# Patient Record
Sex: MALE | Race: BLACK OR AFRICAN AMERICAN | NOT HISPANIC OR LATINO | Employment: UNEMPLOYED | URBAN - METROPOLITAN AREA
[De-identification: names, ages, dates, MRNs, and addresses within clinical notes are randomized per-mention and may not be internally consistent; named-entity substitution may affect disease eponyms.]

---

## 2019-02-19 VITALS — HEIGHT: 64 IN | BODY MASS INDEX: 25.61 KG/M2 | WEIGHT: 150 LBS

## 2019-02-19 DIAGNOSIS — C34.92 MALIGNANT NEOPLASM OF LEFT LUNG STAGE 4: ICD-10-CM

## 2019-02-19 RX ORDER — HYDROCODONE BITARTRATE AND ACETAMINOPHEN 10; 325 MG/1; MG/1
1 TABLET ORAL
Qty: 90 TABLET | Refills: 0 | OUTPATIENT
Start: 2019-02-19

## 2019-02-19 RX ORDER — FOLIC ACID 1 MG/1
1 TABLET ORAL DAILY
COMMUNITY
Start: 2019-02-04 | End: 2019-11-07

## 2019-02-19 RX ORDER — CALCIUM CARBONATE 600 MG
600 TABLET ORAL DAILY
COMMUNITY

## 2019-02-19 RX ORDER — HYDROCODONE BITARTRATE AND ACETAMINOPHEN 10; 325 MG/1; MG/1
1 TABLET ORAL
COMMUNITY
Start: 2019-02-01 | End: 2019-03-04 | Stop reason: SDUPTHER

## 2019-02-19 RX ORDER — LEVOTHYROXINE SODIUM 200 UG/1
1 TABLET ORAL DAILY
COMMUNITY
Start: 2019-02-06 | End: 2019-05-14 | Stop reason: DRUGHIGH

## 2019-02-19 RX ORDER — HEPARIN 100 UNIT/ML
500 SYRINGE INTRAVENOUS
Status: CANCELLED | OUTPATIENT
Start: 2019-02-19

## 2019-02-19 RX ORDER — SODIUM CHLORIDE 0.9 % (FLUSH) 0.9 %
10 SYRINGE (ML) INJECTION
Status: CANCELLED | OUTPATIENT
Start: 2019-02-19

## 2019-02-23 DIAGNOSIS — C34.92 MALIGNANT NEOPLASM OF LEFT LUNG STAGE 4: Primary | ICD-10-CM

## 2019-03-04 DIAGNOSIS — C34.92 MALIGNANT NEOPLASM OF LEFT LUNG STAGE 4: Primary | ICD-10-CM

## 2019-03-04 NOTE — TELEPHONE ENCOUNTER
Pt picked up script    ----- Message from Rosio Adames sent at 3/4/2019  1:36 PM CST -----  Contact: patient  Called for refill of NORCO

## 2019-03-05 RX ORDER — HYDROCODONE BITARTRATE AND ACETAMINOPHEN 10; 325 MG/1; MG/1
1 TABLET ORAL
Qty: 120 TABLET | Refills: 0 | Status: SHIPPED | OUTPATIENT
Start: 2019-03-05 | End: 2019-04-02 | Stop reason: ALTCHOICE

## 2019-03-11 DIAGNOSIS — C34.92 MALIGNANT NEOPLASM OF LEFT LUNG STAGE 4: Primary | ICD-10-CM

## 2019-03-11 DIAGNOSIS — E03.9 HYPOTHYROIDISM, UNSPECIFIED TYPE: ICD-10-CM

## 2019-03-12 ENCOUNTER — OFFICE VISIT (OUTPATIENT)
Dept: HEMATOLOGY/ONCOLOGY | Facility: CLINIC | Age: 54
End: 2019-03-12
Payer: MEDICAID

## 2019-03-12 VITALS
OXYGEN SATURATION: 97 % | TEMPERATURE: 98 F | DIASTOLIC BLOOD PRESSURE: 99 MMHG | HEART RATE: 87 BPM | BODY MASS INDEX: 25.61 KG/M2 | RESPIRATION RATE: 18 BRPM | SYSTOLIC BLOOD PRESSURE: 151 MMHG | HEIGHT: 64 IN | WEIGHT: 150 LBS

## 2019-03-12 DIAGNOSIS — C34.92 MALIGNANT NEOPLASM OF LEFT LUNG STAGE 4: Primary | ICD-10-CM

## 2019-03-12 PROCEDURE — 99215 OFFICE O/P EST HI 40 MIN: CPT | Mod: S$GLB,,, | Performed by: NURSE PRACTITIONER

## 2019-03-12 PROCEDURE — 99215 PR OFFICE/OUTPT VISIT, EST, LEVL V, 40-54 MIN: ICD-10-PCS | Mod: S$GLB,,, | Performed by: NURSE PRACTITIONER

## 2019-03-12 RX ORDER — HEPARIN 100 UNIT/ML
500 SYRINGE INTRAVENOUS
Status: CANCELLED | OUTPATIENT
Start: 2019-03-12

## 2019-03-12 RX ORDER — SODIUM CHLORIDE 0.9 % (FLUSH) 0.9 %
10 SYRINGE (ML) INJECTION
Status: CANCELLED | OUTPATIENT
Start: 2019-03-12

## 2019-03-12 NOTE — PROGRESS NOTES
Subjective:      Patient ID: Jaime Motta is a 53 y.o. male.    Oncology History:     Malignant neoplasm of left lung stage 4    4/30/2017 Imaging Significant Findings     CT Thoracic spine shows posterior midline mass athethe T 23-L1 with soft tissue mass 3.5 cm          5/19/2017 Surgery     Neurosx at [facility] with Dr Harry Thoracic and lumbar laminetomy at T12-L1           5/22/2017 Imaging Significant Findings     MRI Thoracic Spine done for back pain showing large erosive mass in upper lumbar spine         5/24/2017 Pathology Significant Finding     Met adenoca CK7 + full path report not available from [facility]          8/10/2017 Pathology Significant Finding     Cytology LML mass adenoca well differeniated, TTF1+    EGRF/ MMR/MSi Not Detected         9/7/2017 - 3/1/2018 Chemotherapy       Carbo/Alimta cycle 1  Carbo/Alimta/Keytruda cycle 2 - 6         2/28/2019 Imaging Significant Findings     CT C/A/P stable disease as compared to 10/26/18              Chief Complaint: Lung Cancer    Jaime Motta is here for OP PEMBROLIZUMAB 200MG Q3W      Treatment Goal:   Palliative      Status:   Active      Start Date:   3/23/2018      End Date:   2/19/2019      Provider:   Faith Swain NP      Chemotherapy:   pembrolizumab (KEYTRUDA) 200 mg in sodium chloride 0.9% 100 mL chemo infusion, 200 mg, Intravenous, Clinic/HOD 1 time, 15 of 15 cycles    So far the patient appears to tolerate chemotherapy fairly well.  Nausea is controlled with the current anti-nausea regimen.  The patient does have mild fatigue.  Here today to discuss results from CT C/A/P done on 2/28/19 showing no significant change with stable bone lesions to L1, L2, L3 as compared to 10/26/18.     Pt is to cont Keytruda and xgeva as planned.     Pt c/o chronic right shoulder pain r/t old trauma.         Past Medical History:   Diagnosis Date    Bone cancer     Lung cancer      Family History   Problem Relation Age of Onset    Bladder Cancer Mother     Cancer  Father     Rectal cancer Brother     Lung cancer Brother     Pancreatic cancer Other     Esophageal cancer Other      Social History     Socioeconomic History    Marital status: Single     Spouse name: Not on file    Number of children: Not on file    Years of education: Not on file    Highest education level: Not on file   Social Needs    Financial resource strain: Not on file    Food insecurity - worry: Not on file    Food insecurity - inability: Not on file    Transportation needs - medical: Not on file    Transportation needs - non-medical: Not on file   Occupational History    Not on file   Tobacco Use    Smoking status: Current Every Day Smoker     Packs/day: 0.25     Years: 30.00     Pack years: 7.50    Smokeless tobacco: Never Used   Substance and Sexual Activity    Alcohol use: Yes    Drug use: No    Sexual activity: Not on file   Other Topics Concern    Not on file   Social History Narrative    Not on file     History reviewed. No pertinent surgical history.        Review of systems:  Review of Systems   Constitutional: Negative for activity change, appetite change, chills, fever and unexpected weight change.   HENT: Negative for dental problem, mouth sores, sore throat and trouble swallowing.    Eyes: Negative for visual disturbance.   Respiratory: Negative for cough, chest tightness and shortness of breath.    Cardiovascular: Negative for chest pain, palpitations and leg swelling.   Gastrointestinal: Negative for abdominal distention, abdominal pain, blood in stool, constipation, diarrhea, nausea, rectal pain and vomiting.   Genitourinary: Negative for dysuria and hematuria.   Musculoskeletal: Positive for neck pain (pt c/o pain to his right shoulder and neck area; reports radiates down right arm; reports he has been trying to cut back on painting to see if this alleviates pain). Negative for arthralgias, back pain, gait problem, joint swelling and myalgias.   Skin: Negative for  pallor and rash.   Neurological: Negative for dizziness, syncope, weakness, light-headedness, numbness and headaches.   Hematological: Negative for adenopathy. Does not bruise/bleed easily.   Psychiatric/Behavioral: Negative for agitation and suicidal ideas.       Objective:     Physical Exam   Constitutional: He is oriented to person, place, and time. He appears well-developed and well-nourished.   Neck: Normal range of motion.   Cardiovascular: Normal rate and regular rhythm.   Pulmonary/Chest: Effort normal and breath sounds normal.   Musculoskeletal: He exhibits tenderness.        Right shoulder: He exhibits decreased range of motion and tenderness.   Neurological: He is alert and oriented to person, place, and time.   Psychiatric: He has a normal mood and affect.     Vitals:    03/12/19 0936   BP: (!) 151/99   Pulse: 87   Resp: 18   Temp: 97.5 °F (36.4 °C)   Body surface area is 1.75 meters squared.    Labs:  Reviewed    Assessment:      1. Malignant neoplasm of left lung stage 4           Plan:   Malignant neoplasm of left lung stage 4        Pt undergoing chemotherapy, with risks, side effects and benefits discussed. Pt is instructed to RTC with labs for continued monitoring of treatment as instructed.  Adv to cont OTC Vit D and Calcium.       Faith Swain, ANP

## 2019-04-02 ENCOUNTER — OFFICE VISIT (OUTPATIENT)
Dept: HEMATOLOGY/ONCOLOGY | Facility: CLINIC | Age: 54
End: 2019-04-02
Payer: MEDICAID

## 2019-04-02 VITALS
WEIGHT: 156 LBS | DIASTOLIC BLOOD PRESSURE: 94 MMHG | TEMPERATURE: 97 F | OXYGEN SATURATION: 97 % | HEIGHT: 64 IN | BODY MASS INDEX: 26.63 KG/M2 | SYSTOLIC BLOOD PRESSURE: 136 MMHG | RESPIRATION RATE: 18 BRPM | HEART RATE: 80 BPM

## 2019-04-02 DIAGNOSIS — C79.51 METASTATIC CANCER TO BONE: ICD-10-CM

## 2019-04-02 DIAGNOSIS — C34.92 MALIGNANT NEOPLASM OF LEFT LUNG STAGE 4: Primary | ICD-10-CM

## 2019-04-02 PROCEDURE — 99215 OFFICE O/P EST HI 40 MIN: CPT | Mod: S$GLB,,, | Performed by: NURSE PRACTITIONER

## 2019-04-02 PROCEDURE — 99215 PR OFFICE/OUTPT VISIT, EST, LEVL V, 40-54 MIN: ICD-10-PCS | Mod: S$GLB,,, | Performed by: NURSE PRACTITIONER

## 2019-04-02 RX ORDER — SODIUM CHLORIDE 0.9 % (FLUSH) 0.9 %
10 SYRINGE (ML) INJECTION
Status: SHIPPED | OUTPATIENT
Start: 2019-04-02

## 2019-04-02 RX ORDER — HEPARIN 100 UNIT/ML
500 SYRINGE INTRAVENOUS
Status: SHIPPED | OUTPATIENT
Start: 2019-04-02

## 2019-04-02 RX ORDER — HYDROCODONE BITARTRATE AND ACETAMINOPHEN 10; 325 MG/1; MG/1
1 TABLET ORAL
Qty: 120 TABLET | Refills: 0 | Status: CANCELLED | OUTPATIENT
Start: 2019-04-02

## 2019-04-02 RX ORDER — OXYCODONE AND ACETAMINOPHEN 5; 325 MG/1; MG/1
1 TABLET ORAL EVERY 4 HOURS PRN
Qty: 90 TABLET | Refills: 0 | Status: SHIPPED | OUTPATIENT
Start: 2019-04-02 | End: 2019-05-01 | Stop reason: SDUPTHER

## 2019-04-02 NOTE — PROGRESS NOTES
Subjective:      Patient ID: Jaime Motta is a 53 y.o. male.    Oncology History:     Malignant neoplasm of left lung stage 4    4/30/2017 Imaging Significant Findings     CT Thoracic spine shows posterior midline mass athethe T 23-L1 with soft tissue mass 3.5 cm          5/19/2017 Surgery     Neurosx at [facility] with Dr Harry Thoracic and lumbar laminetomy at T12-L1           5/22/2017 Imaging Significant Findings     MRI Thoracic Spine done for back pain showing large erosive mass in upper lumbar spine         5/24/2017 Pathology Significant Finding     Met adenoca CK7 + full path report not available from [facility]          8/10/2017 Pathology Significant Finding     Cytology LML mass adenoca well differeniated, TTF1+    EGRF/ MMR/MSi Not Detected         9/7/2017 - 3/1/2018 Chemotherapy       Carbo/Alimta cycle 1  Carbo/Alimta/Keytruda cycle 2 - 6         2/28/2019 Imaging Significant Findings     CT C/A/P stable disease as compared to 10/26/18              Chief Complaint: Lung Cancer    Jaime Motta is here for OP PEMBROLIZUMAB 200MG Q3W      Treatment Goal:   Palliative      Status:   Active      Start Date:   3/23/2018      End Date:   5/14/2019 (Planned)      Provider:   Faith Swain NP      Chemotherapy:   pembrolizumab (KEYTRUDA) 200 mg in sodium chloride 0.9% 100 mL chemo infusion, 200 mg, Intravenous, Clinic/HOD 1 time, 17 of 19 cycles    So far the patient appears to tolerate chemotherapy fairly well.  Nausea is controlled with the current anti-nausea regimen.  The patient does have mild fatigue.  Today the main concern is increased pain to ight shoulder, hip and leg. Pt ran out of [place] over weekend. Previously received Norco 10/325 qt 120 on 3/7/19. Pt states he has difficulty sleeping due to pain but unsure how often or how many pills he is taking. Will change Norco to Percocet 5/325 mg 1 po tid.    Pt is to cont Keytruda and xgeva as planned.       Past Medical History:   Diagnosis Date    Bone cancer      Cancer     Lung cancer     Thyroid disease      Family History   Problem Relation Age of Onset    Bladder Cancer Mother     Cancer Father     Rectal cancer Brother     Lung cancer Brother     Pancreatic cancer Other     Esophageal cancer Other      Social History     Socioeconomic History    Marital status: Single     Spouse name: Not on file    Number of children: Not on file    Years of education: Not on file    Highest education level: Not on file   Occupational History    Not on file   Social Needs    Financial resource strain: Not on file    Food insecurity:     Worry: Not on file     Inability: Not on file    Transportation needs:     Medical: Not on file     Non-medical: Not on file   Tobacco Use    Smoking status: Current Every Day Smoker     Packs/day: 0.25     Years: 30.00     Pack years: 7.50    Smokeless tobacco: Never Used   Substance and Sexual Activity    Alcohol use: Yes     Comment: occasionally    Drug use: No    Sexual activity: Not on file   Lifestyle    Physical activity:     Days per week: Not on file     Minutes per session: Not on file    Stress: Not on file   Relationships    Social connections:     Talks on phone: Not on file     Gets together: Not on file     Attends Nondenominational service: Not on file     Active member of club or organization: Not on file     Attends meetings of clubs or organizations: Not on file     Relationship status: Not on file    Intimate partner violence:     Fear of current or ex partner: Not on file     Emotionally abused: Not on file     Physically abused: Not on file     Forced sexual activity: Not on file   Other Topics Concern    Not on file   Social History Narrative    Not on file     Past Surgical History:   Procedure Laterality Date    BACK SURGERY      FINGER SURGERY      HERNIA REPAIR      LUNG BIOPSY      PORTACATH PLACEMENT             Review of systems:  Review of Systems   Constitutional: Negative for activity change,  appetite change, chills, fever and unexpected weight change.   HENT: Negative for dental problem, mouth sores, sore throat and trouble swallowing.    Eyes: Negative for visual disturbance.   Respiratory: Negative for cough, chest tightness and shortness of breath.    Cardiovascular: Negative for chest pain, palpitations and leg swelling.   Gastrointestinal: Negative for abdominal distention, abdominal pain, blood in stool, constipation, diarrhea, nausea, rectal pain and vomiting.   Genitourinary: Negative for dysuria and hematuria.   Musculoskeletal: Positive for neck pain (pt c/o pain to his right shoulder and neck area; reports radiates down right arm; reports he has been trying to cut back on painting to see if this alleviates pain). Negative for arthralgias, back pain, gait problem, joint swelling and myalgias.   Skin: Negative for pallor and rash.   Neurological: Negative for dizziness, syncope, weakness, light-headedness, numbness and headaches.   Hematological: Negative for adenopathy. Does not bruise/bleed easily.   Psychiatric/Behavioral: Negative for agitation and suicidal ideas.       Objective:     Physical Exam   Constitutional: He is oriented to person, place, and time. He appears well-developed and well-nourished.   Neck: Normal range of motion.   Cardiovascular: Normal rate and regular rhythm.   Pulmonary/Chest: Effort normal and breath sounds normal.   Musculoskeletal: He exhibits tenderness.        Right shoulder: He exhibits decreased range of motion and tenderness.   Neurological: He is alert and oriented to person, place, and time.   Psychiatric: He has a normal mood and affect.     Vitals:    04/02/19 0907   BP: (!) 136/94   Pulse: 80   Resp: 18   Temp: 97.4 °F (36.3 °C)   Body surface area is 1.79 meters squared.    Labs:  42/19 Reviewed HGB 12.6 Hct 38.9  TSH 0.11 CMP WNL    Assessment:      1. Malignant neoplasm of left lung stage 4    2. Metastatic cancer to bone           Plan:    Malignant neoplasm of left lung stage 4  -     pembrolizumab (KEYTRUDA) 200 mg in sodium chloride 0.9% 100 mL chemo infusion  -     sodium chloride 0.9% 100 mL flush bag  -     sodium chloride 0.9% flush 10 mL  -     heparin, porcine (PF) 100 unit/mL injection flush 500 Units  -     alteplase injection 2 mg    Metastatic cancer to bone    Discussed taking narcotic on a daily schedule of q 8 hours so better evaluate pain control.     Pt undergoing chemotherapy, with risks, side effects and benefits discussed. Pt is instructed to RTC with labs for continued monitoring of treatment as instructed.  Adv to cont OTC Vit D and Calcium.     Xgeva due 4/23/19.     Faith Swain, ANP

## 2019-04-23 ENCOUNTER — OFFICE VISIT (OUTPATIENT)
Dept: HEMATOLOGY/ONCOLOGY | Facility: CLINIC | Age: 54
End: 2019-04-23
Payer: MEDICAID

## 2019-04-23 VITALS
OXYGEN SATURATION: 96 % | HEART RATE: 96 BPM | WEIGHT: 151.81 LBS | HEIGHT: 64 IN | DIASTOLIC BLOOD PRESSURE: 85 MMHG | TEMPERATURE: 98 F | BODY MASS INDEX: 25.92 KG/M2 | RESPIRATION RATE: 14 BRPM | SYSTOLIC BLOOD PRESSURE: 128 MMHG

## 2019-04-23 DIAGNOSIS — C34.92 MALIGNANT NEOPLASM OF LEFT LUNG STAGE 4: Primary | ICD-10-CM

## 2019-04-23 DIAGNOSIS — C79.51 METASTATIC CANCER TO BONE: ICD-10-CM

## 2019-04-23 PROCEDURE — 99215 PR OFFICE/OUTPT VISIT, EST, LEVL V, 40-54 MIN: ICD-10-PCS | Mod: S$GLB,,, | Performed by: NURSE PRACTITIONER

## 2019-04-23 PROCEDURE — 99215 OFFICE O/P EST HI 40 MIN: CPT | Mod: S$GLB,,, | Performed by: NURSE PRACTITIONER

## 2019-04-23 RX ORDER — SODIUM CHLORIDE 0.9 % (FLUSH) 0.9 %
10 SYRINGE (ML) INJECTION
Status: CANCELLED | OUTPATIENT
Start: 2019-04-23

## 2019-04-23 RX ORDER — HEPARIN 100 UNIT/ML
500 SYRINGE INTRAVENOUS
Status: CANCELLED | OUTPATIENT
Start: 2019-04-23

## 2019-04-23 NOTE — PROGRESS NOTES
Subjective:      Patient ID: Jaime Motta is a 53 y.o. male.    Oncology History:     Malignant neoplasm of left lung stage 4    4/30/2017 Imaging Significant Findings     CT Thoracic spine shows posterior midline mass athethe T 23-L1 with soft tissue mass 3.5 cm          5/19/2017 Surgery     Neurosx at [facility] with Dr Harry Thoracic and lumbar laminetomy at T12-L1           5/22/2017 Imaging Significant Findings     MRI Thoracic Spine done for back pain showing large erosive mass in upper lumbar spine         5/24/2017 Pathology Significant Finding     Met adenoca CK7 + full path report not available from [facility]          8/10/2017 Pathology Significant Finding     Cytology LML mass adenoca well differeniated, TTF1+    EGRF/ MMR/MSi Not Detected         9/7/2017 - 3/1/2018 Chemotherapy       Carbo/Alimta cycle 1  Carbo/Alimta/Keytruda cycle 2 - 6         2/28/2019 Imaging Significant Findings     CT C/A/P stable disease as compared to 10/26/18              Chief Complaint: Cancer    Jaime Motta is here for OP PEMBROLIZUMAB 200MG Q3W      Treatment Goal:   Palliative      Status:   Active      Start Date:   3/23/2018      End Date:   5/14/2019 (Planned)      Provider:   Faith Swain NP      Chemotherapy:   pembrolizumab (KEYTRUDA) 200 mg in sodium chloride 0.9% 100 mL chemo infusion, 200 mg, Intravenous, Clinic/HOD 1 time, 17 of 19 cycles    Mr Motta has metastatic lung cancer and has been on Keytruda q 3 weeks since 3/23/18 and xgeva q 6 weeks. So far the patient appears to tolerate chemotherapy fairly well.  The patient does have mild fatigue.  Today the main concern is increased pain to right shoulder, hip and leg.Patient changed from Norco to Percocet 5/325 mg 1 po tid at last visit and pt states pain is better controlled.         Past Medical History:   Diagnosis Date    Bone cancer     Cancer     Lung cancer     Thyroid disease      Family History   Problem Relation Age of Onset    Bladder Cancer Mother      Cancer Father     Rectal cancer Brother     Lung cancer Brother     Pancreatic cancer Other     Esophageal cancer Other      Social History     Socioeconomic History    Marital status: Single     Spouse name: Not on file    Number of children: Not on file    Years of education: Not on file    Highest education level: Not on file   Occupational History    Not on file   Social Needs    Financial resource strain: Not on file    Food insecurity:     Worry: Not on file     Inability: Not on file    Transportation needs:     Medical: Not on file     Non-medical: Not on file   Tobacco Use    Smoking status: Current Every Day Smoker     Packs/day: 0.25     Years: 30.00     Pack years: 7.50    Smokeless tobacco: Never Used   Substance and Sexual Activity    Alcohol use: Yes     Alcohol/week: 1.8 oz     Types: 3 Cans of beer per week     Comment: occasionally    Drug use: No    Sexual activity: Not on file   Lifestyle    Physical activity:     Days per week: Not on file     Minutes per session: Not on file    Stress: Not on file   Relationships    Social connections:     Talks on phone: Not on file     Gets together: Not on file     Attends Shinto service: Not on file     Active member of club or organization: Not on file     Attends meetings of clubs or organizations: Not on file     Relationship status: Not on file   Other Topics Concern    Not on file   Social History Narrative    Not on file     Past Surgical History:   Procedure Laterality Date    BACK SURGERY      FINGER SURGERY      HERNIA REPAIR      LUNG BIOPSY      PORTACATH PLACEMENT             Review of systems:  Review of Systems   Constitutional: Negative for activity change, appetite change, chills, fever and unexpected weight change.   HENT: Negative for dental problem, mouth sores, sore throat and trouble swallowing.    Eyes: Negative for visual disturbance.   Respiratory: Negative for cough, chest tightness and shortness of  breath.    Cardiovascular: Negative for chest pain, palpitations and leg swelling.   Gastrointestinal: Negative for abdominal distention, abdominal pain, blood in stool, constipation, diarrhea, nausea, rectal pain and vomiting.   Genitourinary: Negative for dysuria and hematuria.   Musculoskeletal: Positive for neck pain (pt c/o pain to his right shoulder and neck area; reports radiates down right arm; reports he has been trying to cut back on painting to see if this alleviates pain). Negative for arthralgias, back pain, gait problem, joint swelling and myalgias.   Skin: Negative for pallor and rash.   Neurological: Negative for dizziness, syncope, weakness, light-headedness, numbness and headaches.   Hematological: Negative for adenopathy. Does not bruise/bleed easily.   Psychiatric/Behavioral: Negative for agitation and suicidal ideas.       Objective:     Physical Exam   Constitutional: He is oriented to person, place, and time. He appears well-developed and well-nourished.   Neck: Normal range of motion.   Cardiovascular: Normal rate and regular rhythm.   Pulmonary/Chest: Effort normal and breath sounds normal.   Musculoskeletal: He exhibits tenderness.        Right shoulder: He exhibits decreased range of motion and tenderness.   Neurological: He is alert and oriented to person, place, and time.   Psychiatric: He has a normal mood and affect.     Vitals:    04/23/19 1025   BP: 128/85   Pulse: 96   Resp: 14   Temp: 98 °F (36.7 °C)   Body surface area is 1.76 meters squared.    Labs:  4/23/19 WBC 5.1 HGB 14.1 Hct 41.7  TSH 0.11 CMP WNL    Assessment:      1. Malignant neoplasm of left lung stage 4    2. Metastatic cancer to bone           Plan:   Malignant neoplasm of left lung stage 4    Metastatic cancer to bone      Pt feeling much better today with better pain control.  Ok to proceed with treatment as planned for today.   Xgeva due today and 6/4/19.     Faith Swain, ANP

## 2019-05-01 DIAGNOSIS — C79.51 METASTATIC CANCER TO BONE: ICD-10-CM

## 2019-05-01 DIAGNOSIS — C34.92 MALIGNANT NEOPLASM OF LEFT LUNG STAGE 4: ICD-10-CM

## 2019-05-01 RX ORDER — OXYCODONE AND ACETAMINOPHEN 5; 325 MG/1; MG/1
1 TABLET ORAL EVERY 4 HOURS PRN
Qty: 90 TABLET | Refills: 0 | Status: SHIPPED | OUTPATIENT
Start: 2019-05-01 | End: 2019-05-14 | Stop reason: SDUPTHER

## 2019-05-10 DIAGNOSIS — E03.9 HYPOTHYROIDISM, UNSPECIFIED TYPE: Primary | ICD-10-CM

## 2019-05-10 DIAGNOSIS — C34.92 MALIGNANT NEOPLASM OF LEFT LUNG STAGE 4: ICD-10-CM

## 2019-05-14 ENCOUNTER — OFFICE VISIT (OUTPATIENT)
Dept: HEMATOLOGY/ONCOLOGY | Facility: CLINIC | Age: 54
End: 2019-05-14
Payer: MEDICAID

## 2019-05-14 VITALS
RESPIRATION RATE: 12 BRPM | BODY MASS INDEX: 26.32 KG/M2 | WEIGHT: 154.19 LBS | TEMPERATURE: 99 F | HEART RATE: 107 BPM | OXYGEN SATURATION: 96 % | SYSTOLIC BLOOD PRESSURE: 137 MMHG | HEIGHT: 64 IN | DIASTOLIC BLOOD PRESSURE: 95 MMHG

## 2019-05-14 DIAGNOSIS — G89.3 CANCER RELATED PAIN: ICD-10-CM

## 2019-05-14 DIAGNOSIS — E03.9 HYPOTHYROIDISM, UNSPECIFIED TYPE: ICD-10-CM

## 2019-05-14 DIAGNOSIS — C34.92 MALIGNANT NEOPLASM OF LEFT LUNG STAGE 4: Primary | ICD-10-CM

## 2019-05-14 DIAGNOSIS — C79.51 METASTATIC CANCER TO BONE: ICD-10-CM

## 2019-05-14 PROCEDURE — 99214 OFFICE O/P EST MOD 30 MIN: CPT | Mod: S$GLB,,, | Performed by: NURSE PRACTITIONER

## 2019-05-14 PROCEDURE — 99214 PR OFFICE/OUTPT VISIT, EST, LEVL IV, 30-39 MIN: ICD-10-PCS | Mod: S$GLB,,, | Performed by: NURSE PRACTITIONER

## 2019-05-14 RX ORDER — HEPARIN 100 UNIT/ML
500 SYRINGE INTRAVENOUS
Status: CANCELLED | OUTPATIENT
Start: 2019-05-14

## 2019-05-14 RX ORDER — LEVOTHYROXINE SODIUM 150 UG/1
150 TABLET ORAL DAILY
Qty: 30 TABLET | Refills: 11 | Status: SHIPPED | OUTPATIENT
Start: 2019-05-14 | End: 2019-10-18

## 2019-05-14 RX ORDER — SODIUM CHLORIDE 0.9 % (FLUSH) 0.9 %
10 SYRINGE (ML) INJECTION
Status: CANCELLED | OUTPATIENT
Start: 2019-05-14

## 2019-05-14 RX ORDER — OXYCODONE AND ACETAMINOPHEN 5; 325 MG/1; MG/1
1 TABLET ORAL EVERY 4 HOURS PRN
Qty: 90 TABLET | Refills: 0 | Status: SHIPPED | OUTPATIENT
Start: 2019-05-14 | End: 2019-06-04 | Stop reason: SDUPTHER

## 2019-05-14 NOTE — PROGRESS NOTES
Subjective:      Patient ID: Jaime Motta is a 53 y.o. male.    Oncology History:     Malignant neoplasm of left lung stage 4    4/30/2017 Imaging Significant Findings     CT Thoracic spine shows posterior midline mass athethe T 23-L1 with soft tissue mass 3.5 cm          5/19/2017 Surgery     Neurosx at [facility] with Dr Harry Thoracic and lumbar laminetomy at T12-L1           5/22/2017 Imaging Significant Findings     MRI Thoracic Spine done for back pain showing large erosive mass in upper lumbar spine         5/24/2017 Pathology Significant Finding     Met adenoca CK7 + full path report not available from [facility]          8/10/2017 Pathology Significant Finding     Cytology LML mass adenoca well differeniated, TTF1+    EGRF/ MMR/MSi Not Detected         9/7/2017 - 3/1/2018 Chemotherapy       Carbo/Alimta cycle 1  Carbo/Alimta/Keytruda cycle 2 - 6         2/28/2019 Imaging Significant Findings     CT C/A/P stable disease as compared to 10/26/18              Chief Complaint: Lung Cancer    Jaime Motta is here for OP PEMBROLIZUMAB 200MG Q3W      Treatment Goal:   Palliative      Status:   Active      Start Date:   3/23/2018      End Date:   7/16/2019 (Planned)      Provider:   Faith Swain NP      Chemotherapy:   pembrolizumab (KEYTRUDA) 200 mg in sodium chloride 0.9% 100 mL chemo infusion, 200 mg, Intravenous, Clinic/HOD 1 time, 18 of 22 cycles    Mr Motta has metastatic lung cancer and has been on Keytruda q 3 weeks since 3/23/18 and xgeva q 6 weeks. So far the patient appears to tolerate chemotherapy fairly well.  The patient does have mild fatigue.  Today the main concern is increased pain to right shoulder, hip and leg.Patient changed from Norco to Percocet 5/325 mg 1 po tid at last visit and pt states pain is better controlled. Otherwise feeling good, working as tolerated.           Past Medical History:   Diagnosis Date    Bone cancer     Cancer     Lung cancer     Thyroid disease      Family History   Problem  Relation Age of Onset    Bladder Cancer Mother     Cancer Father     Rectal cancer Brother     Lung cancer Brother     Pancreatic cancer Other     Esophageal cancer Other      Social History     Socioeconomic History    Marital status: Single     Spouse name: Not on file    Number of children: Not on file    Years of education: Not on file    Highest education level: Not on file   Occupational History    Not on file   Social Needs    Financial resource strain: Not on file    Food insecurity:     Worry: Not on file     Inability: Not on file    Transportation needs:     Medical: Not on file     Non-medical: Not on file   Tobacco Use    Smoking status: Current Every Day Smoker     Packs/day: 0.25     Years: 30.00     Pack years: 7.50    Smokeless tobacco: Never Used   Substance and Sexual Activity    Alcohol use: Yes     Alcohol/week: 1.8 oz     Types: 3 Cans of beer per week     Comment: occasionally    Drug use: No    Sexual activity: Not on file   Lifestyle    Physical activity:     Days per week: Not on file     Minutes per session: Not on file    Stress: Not on file   Relationships    Social connections:     Talks on phone: Not on file     Gets together: Not on file     Attends Nondenominational service: Not on file     Active member of club or organization: Not on file     Attends meetings of clubs or organizations: Not on file     Relationship status: Not on file   Other Topics Concern    Not on file   Social History Narrative    Not on file     Past Surgical History:   Procedure Laterality Date    BACK SURGERY      FINGER SURGERY      HERNIA REPAIR      LUNG BIOPSY      PORTACATH PLACEMENT             Review of systems:  Review of Systems   Constitutional: Negative for activity change, appetite change, chills, fever and unexpected weight change.   HENT: Negative for dental problem, mouth sores, sore throat and trouble swallowing.    Eyes: Negative for visual disturbance.   Respiratory:  Negative for cough, chest tightness and shortness of breath.    Cardiovascular: Negative for chest pain, palpitations and leg swelling.   Gastrointestinal: Negative for abdominal distention, abdominal pain, blood in stool, constipation, diarrhea, nausea, rectal pain and vomiting.   Genitourinary: Negative for dysuria and hematuria.   Musculoskeletal: Positive for neck pain (pt c/o pain to his right shoulder and neck area; reports radiates down right arm; reports he has been trying to cut back on painting to see if this alleviates pain). Negative for arthralgias, back pain, gait problem, joint swelling and myalgias.   Skin: Negative for pallor and rash.   Neurological: Negative for dizziness, syncope, weakness, light-headedness, numbness and headaches.   Hematological: Negative for adenopathy. Does not bruise/bleed easily.   Psychiatric/Behavioral: Negative for agitation and suicidal ideas.       Objective:     Physical Exam   Constitutional: He is oriented to person, place, and time. He appears well-developed and well-nourished.   Neck: Normal range of motion.   Cardiovascular: Normal rate and regular rhythm.   Pulmonary/Chest: Effort normal and breath sounds normal.   Musculoskeletal: He exhibits tenderness.        Right shoulder: He exhibits decreased range of motion and tenderness.   Neurological: He is alert and oriented to person, place, and time.   Psychiatric: He has a normal mood and affect.     Vitals:    05/14/19 1011   BP: (!) 137/95   Pulse: 107   Resp: 12   Temp: 98.6 °F (37 °C)   Body surface area is 1.78 meters squared.    Labs:  5/14/19 WBC 6.0 HGB 13.4 Hct 39.9  TSH 0.01 CMP AST 39 ALT 44 TP 9.2    Assessment:      1. Malignant neoplasm of left lung stage 4    2. Metastatic cancer to bone    3. Cancer related pain           Plan:   Malignant neoplasm of left lung stage 4    Metastatic cancer to bone    Cancer related pain      Pt feeling much better today with better pain control.  Ok to  proceed with treatment as planned for today.   Will order scans at next visit for June 2019.   Xgeva due today and 6/4/19.   Decrease synthroid dosage.   RTC 3 weeks with labs     PARTH Wilcox

## 2019-06-04 ENCOUNTER — OFFICE VISIT (OUTPATIENT)
Dept: HEMATOLOGY/ONCOLOGY | Facility: CLINIC | Age: 54
End: 2019-06-04
Payer: MEDICAID

## 2019-06-04 VITALS
RESPIRATION RATE: 18 BRPM | SYSTOLIC BLOOD PRESSURE: 157 MMHG | HEIGHT: 64 IN | DIASTOLIC BLOOD PRESSURE: 97 MMHG | HEART RATE: 97 BPM | BODY MASS INDEX: 25.98 KG/M2 | TEMPERATURE: 98 F | WEIGHT: 152.19 LBS | OXYGEN SATURATION: 97 %

## 2019-06-04 DIAGNOSIS — C79.51 METASTATIC CANCER TO BONE: ICD-10-CM

## 2019-06-04 DIAGNOSIS — G89.3 CANCER RELATED PAIN: Primary | ICD-10-CM

## 2019-06-04 DIAGNOSIS — G89.3 CANCER RELATED PAIN: ICD-10-CM

## 2019-06-04 DIAGNOSIS — C34.92 MALIGNANT NEOPLASM OF LEFT LUNG STAGE 4: ICD-10-CM

## 2019-06-04 PROCEDURE — 99214 OFFICE O/P EST MOD 30 MIN: CPT | Mod: S$GLB,,, | Performed by: NURSE PRACTITIONER

## 2019-06-04 PROCEDURE — 99214 PR OFFICE/OUTPT VISIT, EST, LEVL IV, 30-39 MIN: ICD-10-PCS | Mod: S$GLB,,, | Performed by: NURSE PRACTITIONER

## 2019-06-04 RX ORDER — HEPARIN 100 UNIT/ML
500 SYRINGE INTRAVENOUS
Status: CANCELLED | OUTPATIENT
Start: 2019-06-04

## 2019-06-04 RX ORDER — OXYCODONE AND ACETAMINOPHEN 5; 325 MG/1; MG/1
1 TABLET ORAL EVERY 4 HOURS PRN
Qty: 90 TABLET | Refills: 0 | Status: SHIPPED | OUTPATIENT
Start: 2019-06-04 | End: 2019-06-04 | Stop reason: SDUPTHER

## 2019-06-04 RX ORDER — SODIUM CHLORIDE 0.9 % (FLUSH) 0.9 %
10 SYRINGE (ML) INJECTION
Status: CANCELLED | OUTPATIENT
Start: 2019-06-04

## 2019-06-04 NOTE — TELEPHONE ENCOUNTER
Pt. Will need pain meds called out to [facility] on [address]. Two of the local [facility] was [facility] of [facility]. Pt was advised that he will not be able to get script until Fri.

## 2019-06-04 NOTE — PROGRESS NOTES
Subjective:      Patient ID: Jaime Motta is a 53 y.o. male.    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 + full path report not available from [facility]          8/10/2017 Pathology Significant Finding     Cytology LML mass adenoca well differeniated, TTF1+    EGRF/ MMR/MSi Not Detected         9/7/2017 - 3/1/2018 Chemotherapy       Carbo/Alimta cycle 1  Carbo/Alimta/Keytruda cycle 2 - 6         2/28/2019 Imaging Significant Findings     CT C/A/P stable disease as compared to 10/26/18              Chief Complaint: Lung Cancer    Jaime Motta is here for OP PEMBROLIZUMAB 200MG Q3W      Treatment Goal:   Palliative      Status:   Active      Start Date:   3/23/2018      End Date:   8/6/2019 (Planned)      Provider:   Faith Swain NP      Chemotherapy:   pembrolizumab (KEYTRUDA) 200 mg in sodium chloride 0.9% 100 mL chemo infusion, 200 mg, Intravenous, Clinic/HOD 1 time, 19 of 23 cycles    Mr Motta has metastatic lung cancer and has been on Keytruda q 3 weeks since 3/23/18 and xgeva q 6 weeks. So far the patient appears to tolerate chemotherapy fairly well.  The patient does have mild fatigue.  Today the main concern is increased pain to right shoulder, hip and leg.Patient changed from Norco to Percocet 5/325 mg 1 po tid at last visit and pt states pain is better controlled. Otherwise feeling good, working as tolerated.           Past Medical History:   Diagnosis Date    Bone cancer     Cancer     Lung cancer     Thyroid disease      Family History   Problem  Relation Age of Onset    Bladder Cancer Mother     Cancer Father     Rectal cancer Brother     Lung cancer Brother     Pancreatic cancer Other     Esophageal cancer Other      Social History     Socioeconomic History    Marital status: Single     Spouse name: Not on file    Number of children: Not on file    Years of education: Not on file    Highest education level: Not on file   Occupational History    Not on file   Social Needs    Financial resource strain: Not on file    Food insecurity:     Worry: Not on file     Inability: Not on file    Transportation needs:     Medical: Not on file     Non-medical: Not on file   Tobacco Use    Smoking status: Current Every Day Smoker     Packs/day: 0.25     Years: 30.00     Pack years: 7.50    Smokeless tobacco: Never Used   Substance and Sexual Activity    Alcohol use: Yes     Alcohol/week: 1.8 oz     Types: 3 Cans of beer per week     Comment: occasionally    Drug use: No    Sexual activity: Not on file   Lifestyle    Physical activity:     Days per week: Not on file     Minutes per session: Not on file    Stress: Not on file   Relationships    Social connections:     Talks on phone: Not on file     Gets together: Not on file     Attends Orthodox service: Not on file     Active member of club or organization: Not on file     Attends meetings of clubs or organizations: Not on file     Relationship status: Not on file   Other Topics Concern    Not on file   Social History Narrative    Not on file     Past Surgical History:   Procedure Laterality Date    BACK SURGERY      FINGER SURGERY      HERNIA REPAIR      LUNG BIOPSY      PORTACATH PLACEMENT             Review of systems:  Review of Systems   Constitutional: Negative for activity change, appetite change, chills, fever and unexpected weight change.   HENT: Negative for dental problem, mouth sores, sore throat and trouble swallowing.    Eyes: Negative for visual disturbance.   Respiratory:  Negative for cough, chest tightness and shortness of breath.    Cardiovascular: Negative for chest pain, palpitations and leg swelling.   Gastrointestinal: Negative for abdominal distention, abdominal pain, blood in stool, constipation, diarrhea, nausea, rectal pain and vomiting.   Genitourinary: Negative for dysuria and hematuria.   Musculoskeletal: Positive for arthralgias and neck pain (pt c/o pain to his right shoulder and neck area; reports radiates down right arm; reports he has been trying to cut back on painting to see if this alleviates pain). Negative for back pain, gait problem, joint swelling and myalgias.   Skin: Negative for pallor and rash.   Neurological: Negative for dizziness, syncope, weakness, light-headedness, numbness and headaches.   Hematological: Negative for adenopathy. Does not bruise/bleed easily.   Psychiatric/Behavioral: Negative for agitation and suicidal ideas.       Objective:     Physical Exam   Constitutional: He is oriented to person, place, and time. He appears well-developed and well-nourished.   Neck: Normal range of motion.   Cardiovascular: Normal rate and regular rhythm.   Pulmonary/Chest: Effort normal and breath sounds normal.   Musculoskeletal: He exhibits tenderness.        Right shoulder: He exhibits decreased range of motion and tenderness.   Neurological: He is alert and oriented to person, place, and time.   Psychiatric: He has a normal mood and affect.     Vitals:    06/04/19 1208   BP: (!) 157/97   Pulse: 97   Resp: 18   Temp: 98 °F (36.7 °C)   Body surface area is 1.77 meters squared.    Labs:  5/14/19 WBC 6.0 HGB 13.4 Hct 39.9  TSH 0.01 CMP AST 39 ALT 44 TP 9.2  6/3/19 WBC 5.3 HGB 13.3 HCT 39.0  TSH 0.02  CMP WNL     Assessment:      1. Malignant neoplasm of left lung stage 4    2. Metastatic cancer to bone    3. Cancer related pain           Plan:   Malignant neoplasm of left lung stage 4  -     oxyCODONE-acetaminophen (PERCOCET) 5-325 mg per  tablet; Take 1 tablet by mouth every 4 (four) hours as needed for Pain.  Dispense: 90 tablet; Refill: 0    Metastatic cancer to bone  -     oxyCODONE-acetaminophen (PERCOCET) 5-325 mg per tablet; Take 1 tablet by mouth every 4 (four) hours as needed for Pain.  Dispense: 90 tablet; Refill: 0    Cancer related pain  -     oxyCODONE-acetaminophen (PERCOCET) 5-325 mg per tablet; Take 1 tablet by mouth every 4 (four) hours as needed for Pain.  Dispense: 90 tablet; Refill: 0      Pt feeling much better today with better pain control.  Ok to proceed with treatment as planned for today.   Will order scans at next visit for June 2019.   Xgeva due today and 7/16/19.   Decrease synthroid dosage.   RTC 3 weeks with labs     PARTH Wilcox

## 2019-06-05 RX ORDER — OXYCODONE AND ACETAMINOPHEN 5; 325 MG/1; MG/1
1 TABLET ORAL EVERY 4 HOURS PRN
Qty: 90 TABLET | Refills: 0 | Status: SHIPPED | OUTPATIENT
Start: 2019-06-05 | End: 2019-06-27 | Stop reason: SDUPTHER

## 2019-06-06 ENCOUNTER — TELEPHONE (OUTPATIENT)
Dept: HEMATOLOGY/ONCOLOGY | Facility: CLINIC | Age: 54
End: 2019-06-06

## 2019-06-06 NOTE — TELEPHONE ENCOUNTER
Discussed with pt to take medication once daily, first thing in the morning 45 minutes prior to eating or drinking. Pt verbalized understanding.    ----- Message from Rosio Adames sent at 6/6/2019  8:59 AM CDT -----  Contact: patient- [phone]  Please call him about THYROID medication.. hes alittle confused of when and how often to take it..

## 2019-06-27 ENCOUNTER — OFFICE VISIT (OUTPATIENT)
Dept: HEMATOLOGY/ONCOLOGY | Facility: CLINIC | Age: 54
End: 2019-06-27
Payer: MEDICAID

## 2019-06-27 VITALS
WEIGHT: 156.38 LBS | TEMPERATURE: 98 F | HEART RATE: 71 BPM | OXYGEN SATURATION: 96 % | BODY MASS INDEX: 26.7 KG/M2 | RESPIRATION RATE: 16 BRPM | DIASTOLIC BLOOD PRESSURE: 93 MMHG | HEIGHT: 64 IN | SYSTOLIC BLOOD PRESSURE: 148 MMHG

## 2019-06-27 DIAGNOSIS — C34.92 MALIGNANT NEOPLASM OF LEFT LUNG STAGE 4: Primary | ICD-10-CM

## 2019-06-27 DIAGNOSIS — C79.51 METASTATIC CANCER TO BONE: ICD-10-CM

## 2019-06-27 DIAGNOSIS — I10 HYPERTENSION, UNSPECIFIED TYPE: ICD-10-CM

## 2019-06-27 DIAGNOSIS — G89.3 CANCER RELATED PAIN: ICD-10-CM

## 2019-06-27 PROCEDURE — 99214 PR OFFICE/OUTPT VISIT, EST, LEVL IV, 30-39 MIN: ICD-10-PCS | Mod: S$GLB,,, | Performed by: NURSE PRACTITIONER

## 2019-06-27 PROCEDURE — 99214 OFFICE O/P EST MOD 30 MIN: CPT | Mod: S$GLB,,, | Performed by: NURSE PRACTITIONER

## 2019-06-27 RX ORDER — SODIUM CHLORIDE 0.9 % (FLUSH) 0.9 %
10 SYRINGE (ML) INJECTION
Status: CANCELLED | OUTPATIENT
Start: 2019-06-27

## 2019-06-27 RX ORDER — LISINOPRIL 5 MG/1
5 TABLET ORAL DAILY
Qty: 30 TABLET | Refills: 11 | Status: SHIPPED | OUTPATIENT
Start: 2019-06-27 | End: 2019-12-24 | Stop reason: SINTOL

## 2019-06-27 RX ORDER — HEPARIN 100 UNIT/ML
500 SYRINGE INTRAVENOUS
Status: CANCELLED | OUTPATIENT
Start: 2019-06-27

## 2019-06-27 RX ORDER — OXYCODONE AND ACETAMINOPHEN 5; 325 MG/1; MG/1
1 TABLET ORAL EVERY 4 HOURS PRN
Qty: 90 TABLET | Refills: 0 | Status: SHIPPED | OUTPATIENT
Start: 2019-06-27 | End: 2019-08-08 | Stop reason: ALTCHOICE

## 2019-06-27 NOTE — PROGRESS NOTES
Subjective:      Patient ID: Jaime Motta is a 53 y.o. male.    Oncology History:     Malignant neoplasm of left lung stage 4    4/30/2017 Imaging Significant Findings     CT Thoracic spine shows posterior midline mass athethe T 23-L1 with soft tissue mass 3.5 cm          5/19/2017 Surgery     Neurosx at [facility] with Dr Harry Thoracic and lumbar laminetomy at T12-L1           5/22/2017 Imaging Significant Findings     MRI Thoracic Spine done for back pain showing large erosive mass in upper lumbar spine         5/24/2017 Pathology Significant Finding     Met adenoca CK7 + full path report not available from [facility]          8/10/2017 Pathology Significant Finding     Cytology LML mass adenoca well differeniated, TTF1+    EGRF/ MMR/MSi Not Detected         9/7/2017 - 3/1/2018 Chemotherapy       Carbo/Alimta cycle 1  Carbo/Alimta/Keytruda cycle 2 - 6         2/28/2019 Imaging Significant Findings     CT C/A/P stable disease as compared to 10/26/18              Chief Complaint: Lung Cancer    Jaime Motta is here for OP PEMBROLIZUMAB 200MG Q3W      Treatment Goal:   Palliative      Status:   Active      Start Date:   3/23/2018      End Date:   8/6/2019 (Planned)      Provider:   Faith Swain NP      Chemotherapy:   pembrolizumab (KEYTRUDA) 200 mg in sodium chloride 0.9% 100 mL chemo infusion, 200 mg, Intravenous, Clinic/HOD 1 time, 20 of 23 cycles    Mr Motta has metastatic lung cancer and has been on Keytruda q 3 weeks since 3/23/18 and xgeva q 6 weeks. So far the patient appears to tolerate chemotherapy fairly well.  The patient does have mild fatigue.  Today the main concern is increased pain to right shoulder, hip and leg.Patient changed from Norco to Percocet 5/325 mg 1 po tid at last visit and pt states pain is better controlled. Otherwise feeling good, working as tolerated.           Past Medical History:   Diagnosis Date    Bone cancer     Cancer     Lung cancer     Thyroid disease      Family History   Problem  Relation Age of Onset    Bladder Cancer Mother     Cancer Father     Rectal cancer Brother     Lung cancer Brother     Pancreatic cancer Other     Esophageal cancer Other      Social History     Socioeconomic History    Marital status: Single     Spouse name: Not on file    Number of children: Not on file    Years of education: Not on file    Highest education level: Not on file   Occupational History    Not on file   Social Needs    Financial resource strain: Not on file    Food insecurity:     Worry: Not on file     Inability: Not on file    Transportation needs:     Medical: Not on file     Non-medical: Not on file   Tobacco Use    Smoking status: Current Every Day Smoker     Packs/day: 0.25     Years: 30.00     Pack years: 7.50    Smokeless tobacco: Never Used   Substance and Sexual Activity    Alcohol use: Yes     Alcohol/week: 1.8 oz     Types: 3 Cans of beer per week     Comment: occasionally    Drug use: No    Sexual activity: Not on file   Lifestyle    Physical activity:     Days per week: Not on file     Minutes per session: Not on file    Stress: Not on file   Relationships    Social connections:     Talks on phone: Not on file     Gets together: Not on file     Attends Hoahaoism service: Not on file     Active member of club or organization: Not on file     Attends meetings of clubs or organizations: Not on file     Relationship status: Not on file   Other Topics Concern    Not on file   Social History Narrative    Not on file     Past Surgical History:   Procedure Laterality Date    BACK SURGERY      FINGER SURGERY      HERNIA REPAIR      LUNG BIOPSY      PORTACATH PLACEMENT             Review of systems:  Review of Systems   Constitutional: Negative for activity change, appetite change, chills, fever and unexpected weight change.   HENT: Negative for dental problem, mouth sores, sore throat and trouble swallowing.    Eyes: Negative for visual disturbance.   Respiratory:  Negative for cough, chest tightness and shortness of breath.    Cardiovascular: Negative for chest pain, palpitations and leg swelling.   Gastrointestinal: Negative for abdominal distention, abdominal pain, blood in stool, constipation, diarrhea, nausea, rectal pain and vomiting.   Genitourinary: Negative for dysuria and hematuria.   Musculoskeletal: Positive for arthralgias, gait problem (left hip pain and weakness ) and neck pain (pt c/o pain to his right shoulder and neck area; reports radiates down right arm; reports he has been trying to cut back on painting to see if this alleviates pain). Negative for back pain, joint swelling and myalgias.   Skin: Negative for pallor and rash.   Neurological: Negative for dizziness, syncope, weakness, light-headedness, numbness and headaches.   Hematological: Negative for adenopathy. Does not bruise/bleed easily.   Psychiatric/Behavioral: Negative for agitation and suicidal ideas.       Objective:     Physical Exam   Constitutional: He is oriented to person, place, and time. He appears well-developed and well-nourished.   Neck: Normal range of motion.   Cardiovascular: Normal rate and regular rhythm.   Pulmonary/Chest: Effort normal and breath sounds normal.   Musculoskeletal: He exhibits tenderness.        Right shoulder: He exhibits decreased range of motion and tenderness.   Neurological: He is alert and oriented to person, place, and time.   Psychiatric: He has a normal mood and affect.     Vitals:    06/27/19 0849   BP: (!) 148/93   Pulse: 71   Resp: 16   Temp: 98 °F (36.7 °C)   Body surface area is 1.79 meters squared.    Labs:  6/27/19 WBC 7.7 HGB 13.4  CMP WNL TSH 0.15    Assessment:      1. Malignant neoplasm of left lung stage 4    2. Metastatic cancer to bone    3. Cancer related pain    4. Hypertension, unspecified type           Plan:   Malignant neoplasm of left lung stage 4  -     CT Chest With Contrast; Future; Expected date: 06/27/2019  -     CT  Abdomen Pelvis W Wo Contrast; Future; Expected date: 06/27/2019    Metastatic cancer to bone  -     CT Chest With Contrast; Future; Expected date: 06/27/2019  -     CT Abdomen Pelvis W Wo Contrast; Future; Expected date: 06/27/2019    Cancer related pain    Hypertension, unspecified type  -     lisinopril (PRINIVIL,ZESTRIL) 5 MG tablet; Take 1 tablet (5 mg total) by mouth once daily.  Dispense: 30 tablet; Refill: 11      Pt feeling much better today with better pain control.  Ok to proceed with treatment as planned for today.   Will order scans at next visit for June 2019.   Xgeva due today and 7/16/19.   Decrease synthroid dosage.   RTC 6 weeks with labs     PARTH Wilcox

## 2019-07-02 NOTE — PATIENT INSTRUCTIONS
Pembrolizumab injection  What is this medicine?  PEMBROLIZUMAB (pem broe yoseph ue mab) is a monoclonal antibody. It is used to treat melanoma, head and neck cancer, and non-small cell lung cancer.  How should I use this medicine?  This medicine is for infusion into a vein. It is given by a health care professional in a hospital or clinic setting.  A special MedGuide will be given to you before each treatment. Be sure to read this information carefully each time.  Talk to your pediatrician regarding the use of this medicine in children. Special care may be needed.  What side effects may I notice from receiving this medicine?  Side effects that you should report to your doctor or health care professional as soon as possible:  · allergic reactions like skin rash, itching or hives, swelling of the face, lips, or tongue  · bloody or black, tarry stools  · breathing problems  · change in the amount of urine  · changes in vision  · chest pain  · chills  · dark urine  · dizziness or feeling faint or lightheaded  · fast or irregular heartbeat  · fever  · flushing  · hair loss  · muscle pain  · muscle weakness  · persistent headache  · signs and symptoms of high blood sugar such as dizziness; dry mouth; dry skin; fruity breath; nausea; stomach pain; increased hunger or thirst; increased urination  · signs and symptoms of liver injury like dark urine, light-colored stools, loss of appetite, nausea, right upper belly pain, yellowing of the eyes or skin  · stomach pain  · weight loss  Side effects that usually do not require medical attention (Report these to your doctor or health care professional if they continue or are bothersome.):constipation  · cough  · diarrhea  · joint pain  · tiredness  What may interact with this medicine?  Interactions have not been studied.  Give your health care provider a list of all the medicines, herbs, non-prescription drugs, or dietary supplements you use. Also tell them if you smoke, drink  Statement Selected alcohol, or use illegal drugs. Some items may interact with your medicine.  What if I miss a dose?  It is important not to miss your dose. Call your doctor or health care professional if you are unable to keep an appointment.  Where should I keep my medicine?  This drug is given in a hospital or clinic and will not be stored at home.  What should I tell my health care provider before I take this medicine?  They need to know if you have any of these conditions:  · diabetes  · immune system problems  · inflammatory bowel disease  · liver disease  · lung or breathing disease  · lupus  · an unusual or allergic reaction to pembrolizumab, other medicines, foods, dyes, or preservatives  · pregnant or trying to get pregnant  · breast-feeding  What should I watch for while using this medicine?  Your condition will be monitored carefully while you are receiving this medicine.  You may need blood work done while you are taking this medicine.  Do not become pregnant while taking this medicine or for 4 months after stopping it. Women should inform their doctor if they wish to become pregnant or think they might be pregnant. There is a potential for serious side effects to an unborn child. Talk to your health care professional or pharmacist for more information. Do not breast-feed an infant while taking this medicine or for 4 months after the last dose.  NOTE:This sheet is a summary. It may not cover all possible information. If you have questions about this medicine, talk to your doctor, pharmacist, or health care provider. Copyright© 2017 [employer]         Statement Selected Statement Selected Statement Selected

## 2019-07-11 RX ORDER — HEPARIN 100 UNIT/ML
500 SYRINGE INTRAVENOUS
Status: CANCELLED | OUTPATIENT
Start: 2019-07-18

## 2019-07-11 RX ORDER — SODIUM CHLORIDE 0.9 % (FLUSH) 0.9 %
10 SYRINGE (ML) INJECTION
Status: CANCELLED | OUTPATIENT
Start: 2019-07-18

## 2019-07-18 DIAGNOSIS — C34.92 MALIGNANT NEOPLASM OF LEFT LUNG STAGE 4: ICD-10-CM

## 2019-07-18 DIAGNOSIS — C79.51 METASTATIC CANCER TO BONE: ICD-10-CM

## 2019-07-18 DIAGNOSIS — G89.3 CANCER RELATED PAIN: ICD-10-CM

## 2019-07-31 ENCOUNTER — TELEPHONE (OUTPATIENT)
Dept: HEMATOLOGY/ONCOLOGY | Facility: CLINIC | Age: 54
End: 2019-07-31

## 2019-07-31 NOTE — TELEPHONE ENCOUNTER
----- Message from Rosio Adames sent at 7/31/2019 10:08 AM CDT -----  Contact: patient  He called about his BLOOD PRESSURE medication if was needing to still take this.. Before he gets it refilled.he is pretty certain he still has refills on the bottle..

## 2019-08-02 RX ORDER — OXYCODONE AND ACETAMINOPHEN 5; 325 MG/1; MG/1
1 TABLET ORAL EVERY 4 HOURS PRN
Qty: 90 TABLET | Refills: 0 | OUTPATIENT
Start: 2019-08-02

## 2019-08-08 ENCOUNTER — OFFICE VISIT (OUTPATIENT)
Dept: HEMATOLOGY/ONCOLOGY | Facility: CLINIC | Age: 54
End: 2019-08-08
Payer: MEDICAID

## 2019-08-08 VITALS
SYSTOLIC BLOOD PRESSURE: 108 MMHG | WEIGHT: 154.31 LBS | BODY MASS INDEX: 26.34 KG/M2 | DIASTOLIC BLOOD PRESSURE: 75 MMHG | TEMPERATURE: 98 F | HEART RATE: 102 BPM | RESPIRATION RATE: 16 BRPM | OXYGEN SATURATION: 92 % | HEIGHT: 64 IN

## 2019-08-08 DIAGNOSIS — C34.92 MALIGNANT NEOPLASM OF LEFT LUNG STAGE 4: ICD-10-CM

## 2019-08-08 DIAGNOSIS — G89.3 CANCER RELATED PAIN: ICD-10-CM

## 2019-08-08 DIAGNOSIS — C79.51 METASTATIC CANCER TO BONE: ICD-10-CM

## 2019-08-08 PROCEDURE — 99214 OFFICE O/P EST MOD 30 MIN: CPT | Mod: S$GLB,,, | Performed by: NURSE PRACTITIONER

## 2019-08-08 PROCEDURE — 99214 PR OFFICE/OUTPT VISIT, EST, LEVL IV, 30-39 MIN: ICD-10-PCS | Mod: S$GLB,,, | Performed by: NURSE PRACTITIONER

## 2019-08-08 RX ORDER — SODIUM CHLORIDE 0.9 % (FLUSH) 0.9 %
10 SYRINGE (ML) INJECTION
Status: CANCELLED | OUTPATIENT
Start: 2019-08-08

## 2019-08-08 RX ORDER — HEPARIN 100 UNIT/ML
500 SYRINGE INTRAVENOUS
Status: CANCELLED | OUTPATIENT
Start: 2019-08-08

## 2019-08-08 RX ORDER — OXYCODONE AND ACETAMINOPHEN 7.5; 325 MG/1; MG/1
1 TABLET ORAL EVERY 8 HOURS PRN
Qty: 90 TABLET | Refills: 0 | Status: SHIPPED | OUTPATIENT
Start: 2019-08-08 | End: 2019-08-29 | Stop reason: SDUPTHER

## 2019-08-08 NOTE — PROGRESS NOTES
"Subjective:      Patient ID: Jaime Motta is a 53 y.o. male.    Oncology History:     Malignant neoplasm of left lung stage 4    4/30/2017 Imaging Significant Findings     CT Thoracic spine shows posterior midline mass athethe T 23-L1 with soft tissue mass 3.5 cm          5/19/2017 Surgery     Neurosx at [facility] with Dr Harry Thoracic and lumbar laminetomy at T12-L1           5/22/2017 Imaging Significant Findings     MRI Thoracic Spine done for back pain showing large erosive mass in upper lumbar spine         5/24/2017 Pathology Significant Finding     Met adenoca CK7 + full path report not available from [facility]          8/10/2017 Pathology Significant Finding     Cytology LML mass adenoca well differeniated, TTF1+    EGRF/ MMR/MSi Not Detected         9/7/2017 - 3/1/2018 Chemotherapy       Carbo/Alimta cycle 1  Carbo/Alimta/Keytruda cycle 2 - 6         2/28/2019 Imaging Significant Findings     CT C/A/P stable disease as compared to 10/26/18              Chief Complaint: Lung Cancer    Jaime Motta is here for OP PEMBROLIZUMAB 200MG Q3W      Treatment Goal:   Palliative      Status:   Active      Start Date:   3/23/2018      End Date:   8/8/2019 (Planned)      Provider:   Faith Swain NP      Chemotherapy:   pembrolizumab (KEYTRUDA) 200 mg in sodium chloride 0.9% 100 mL chemo infusion, 200 mg, Intravenous, Clinic/HOD 1 time, 22 of 23 cycles    Mr Motta has metastatic lung cancer and has been on Keytruda q 3 weeks since 3/23/18 and xgeva q 6 weeks. So far the patient appears to tolerate chemotherapy fairly well.  The patient does have mild fatigue.  Today the main concern is a recent fall last week due to "hip gave out" and increased back pain, nora on the left. Recent Ct Scans on 8/6/19 show stable disease with no new lytic lesions. Of note, he does have lytic and asclerotic lesion of L1 And L3 as well as right iliac wing but they remain stable.  We will increase his percocet to 7.5/325mg TID for improved pain control. "         Past Medical History:   Diagnosis Date    Bone cancer     Cancer     Lung cancer     Thyroid disease      Family History   Problem Relation Age of Onset    Bladder Cancer Mother     Cancer Father     Rectal cancer Brother     Lung cancer Brother     Pancreatic cancer Other     Esophageal cancer Other      Social History     Socioeconomic History    Marital status: Single     Spouse name: Not on file    Number of children: Not on file    Years of education: Not on file    Highest education level: Not on file   Occupational History    Not on file   Social Needs    Financial resource strain: Not on file    Food insecurity:     Worry: Not on file     Inability: Not on file    Transportation needs:     Medical: Not on file     Non-medical: Not on file   Tobacco Use    Smoking status: Current Every Day Smoker     Packs/day: 0.25     Years: 30.00     Pack years: 7.50    Smokeless tobacco: Never Used   Substance and Sexual Activity    Alcohol use: Yes     Alcohol/week: 1.8 oz     Types: 3 Cans of beer per week     Comment: occasionally    Drug use: No    Sexual activity: Not on file   Lifestyle    Physical activity:     Days per week: Not on file     Minutes per session: Not on file    Stress: Not on file   Relationships    Social connections:     Talks on phone: Not on file     Gets together: Not on file     Attends [employer]oration[employer] service: Not on file     Active member of club or organization: Not on file     Attends meetings of clubs or organizations: Not on file     Relationship status: Not on file   Other Topics Concern    Not on file   Social History Narrative    Not on file     Past Surgical History:   Procedure Laterality Date    BACK SURGERY      FINGER SURGERY      HERNIA REPAIR      LUNG BIOPSY      PORTACATH PLACEMENT             Review of systems:  Review of Systems   Constitutional: Positive for activity change. Negative for chills, fever and unexpected weight change.   HENT:  Negative for dental problem, mouth sores, sore throat and trouble swallowing.    Eyes: Negative for visual disturbance.   Respiratory: Negative for cough, chest tightness and shortness of breath.    Cardiovascular: Negative for chest pain, palpitations and leg swelling.   Gastrointestinal: Negative for abdominal distention, abdominal pain, blood in stool, constipation, diarrhea, nausea, rectal pain and vomiting.   Genitourinary: Negative for dysuria and hematuria.   Musculoskeletal: Positive for arthralgias, back pain, gait problem (left hip pain and weakness ) and neck pain (pt c/o pain to his right shoulder and neck area; reports radiates down right arm; reports he has been trying to cut back on painting to see if this alleviates pain). Negative for joint swelling and myalgias.   Skin: Negative for pallor and rash.   Neurological: Negative for dizziness, syncope, weakness, light-headedness, numbness and headaches.   Hematological: Negative for adenopathy. Does not bruise/bleed easily.   Psychiatric/Behavioral: Negative for agitation and suicidal ideas.       Objective:     Physical Exam   Constitutional: He is oriented to person, place, and time. He appears well-developed and well-nourished.   Neck: Normal range of motion.   Cardiovascular: Normal rate and regular rhythm.   Pulmonary/Chest: Effort normal and breath sounds normal.   Musculoskeletal: He exhibits tenderness.        Right shoulder: He exhibits decreased range of motion and tenderness.   Neurological: He is alert and oriented to person, place, and time.   Psychiatric: He has a normal mood and affect.     Vitals:    08/08/19 0948   BP: 108/75   Pulse: 102   Resp: 16   Temp: 97.8 °F (36.6 °C)   Body surface area is 1.78 meters squared.    Labs:  6/27/19 WBC 5.0 HGB 12.5  Cr 0.89 TSH 0.32    Assessment:      1. Malignant neoplasm of left lung stage 4    2. Metastatic cancer to bone    3. Cancer related pain           Plan:   Malignant neoplasm of  left lung stage 4  -     oxyCODONE-acetaminophen (PERCOCET) 7.5-325 mg per tablet; Take 1 tablet by mouth every 8 (eight) hours as needed for Pain.  Dispense: 90 tablet; Refill: 0    Metastatic cancer to bone  -     oxyCODONE-acetaminophen (PERCOCET) 7.5-325 mg per tablet; Take 1 tablet by mouth every 8 (eight) hours as needed for Pain.  Dispense: 90 tablet; Refill: 0    Cancer related pain      1. Ct scans discussed with patient showing stable disease. Will continue with Keytruda q 3 weeks and xgeva q 6 weeks as planned. Will increase his percocet to 7.5/325 in hopes  Of better pain control.   2. RTC in 3 weeks with labs.     Faith Swain, ANP

## 2019-08-29 ENCOUNTER — OFFICE VISIT (OUTPATIENT)
Dept: HEMATOLOGY/ONCOLOGY | Facility: CLINIC | Age: 54
End: 2019-08-29
Payer: MEDICAID

## 2019-08-29 VITALS
WEIGHT: 155.63 LBS | RESPIRATION RATE: 12 BRPM | DIASTOLIC BLOOD PRESSURE: 78 MMHG | TEMPERATURE: 98 F | OXYGEN SATURATION: 96 % | HEART RATE: 68 BPM | HEIGHT: 64 IN | BODY MASS INDEX: 26.57 KG/M2 | SYSTOLIC BLOOD PRESSURE: 113 MMHG

## 2019-08-29 DIAGNOSIS — C79.51 METASTATIC CANCER TO BONE: ICD-10-CM

## 2019-08-29 DIAGNOSIS — G89.3 CANCER RELATED PAIN: ICD-10-CM

## 2019-08-29 DIAGNOSIS — C34.92 MALIGNANT NEOPLASM OF LEFT LUNG STAGE 4: Primary | ICD-10-CM

## 2019-08-29 LAB
ALBUMIN SERPL BCP-MCNC: 4.1 G/DL (ref 3.4–5)
ALBUMIN/GLOBULIN RATIO: 0.95 RATIO (ref 1.1–1.8)
ALP SERPL-CCNC: 68 U/L (ref 46–116)
ALT SERPL W P-5'-P-CCNC: 24 U/L (ref 12–78)
AST SERPL-CCNC: 19 U/L (ref 15–37)
BASOPHILS NFR SNV MANUAL: 0.5 % (ref 0–3)
BILIRUB SERPL-MCNC: 0.2 MG/DL (ref 0–1)
BUN SERPL-MCNC: 12 MG/DL (ref 7–18)
BUN/CREAT SERPL: 11.88 RATIO (ref 7–18)
CALCIUM SERPL-MCNC: 10.3 MG/DL (ref 8.8–10.5)
CHLORIDE SERPL-SCNC: 104 MMOL/L (ref 100–108)
CO2 SERPL-SCNC: 28 MMOL/L (ref 21–32)
CREAT SERPL-MCNC: 1.01 MG/DL (ref 0.7–1.3)
EOSINOPHIL NFR SNV MANUAL: 4.7 % (ref 1–3)
ERYTHROCYTE [DISTWIDTH] IN BLOOD BY AUTOMATED COUNT: 15.3 % (ref 12.5–18)
GFR ESTIMATION: > 60
GLOBULIN: 4.3 G/DL (ref 2.3–3.5)
GLUCOSE SERPL-MCNC: 119 MG/DL (ref 70–110)
HCT VFR BLD AUTO: 39.5 % (ref 42–52)
HGB BLD-MCNC: 13.4 G/DL (ref 14–18)
LYMPHOCYTES NFR SNV MANUAL: 27.4 % (ref 25–40)
MANUAL NRBC PER 100 CELLS: 0 %
MCH RBC QN AUTO: 31.9 PG (ref 27–31.2)
MCHC RBC AUTO-ENTMCNC: 33.9 G/DL (ref 31.8–35.4)
MCV RBC AUTO: 94 FL (ref 80–97)
MONOCYTES/100 LEUKOCYTES: 6.3 % (ref 1–15)
NEUTROPHILS NFR BLD: 3.35 10*3/UL (ref 1.8–7.7)
NEUTROPHILS NFR SNV MANUAL: 60.6 % (ref 37–80)
PLATELETS: 135 10*3/UL (ref 142–424)
POTASSIUM SERPL-SCNC: 4 MMOL/L (ref 3.6–5.2)
PROT SERPL-MCNC: 8.4 G/DL (ref 6.4–8.2)
RBC # BLD AUTO: 4.2 10*6/UL (ref 4.7–6.1)
SODIUM BLD-SCNC: 140 MMOL/L (ref 135–145)
TSH SERPL DL<=0.005 MIU/L-ACNC: 0.18 UIU/ML (ref 0.36–3.74)
WBC # BLD: 5.5 10*3/UL (ref 4.6–10.2)

## 2019-08-29 PROCEDURE — 99214 PR OFFICE/OUTPT VISIT, EST, LEVL IV, 30-39 MIN: ICD-10-PCS | Mod: S$GLB,,, | Performed by: NURSE PRACTITIONER

## 2019-08-29 PROCEDURE — 99214 OFFICE O/P EST MOD 30 MIN: CPT | Mod: S$GLB,,, | Performed by: NURSE PRACTITIONER

## 2019-08-29 RX ORDER — OXYCODONE AND ACETAMINOPHEN 7.5; 325 MG/1; MG/1
1 TABLET ORAL EVERY 8 HOURS PRN
Qty: 90 TABLET | Refills: 0 | Status: SHIPPED | OUTPATIENT
Start: 2019-08-29 | End: 2019-10-07 | Stop reason: SDUPTHER

## 2019-08-29 RX ORDER — SODIUM CHLORIDE 0.9 % (FLUSH) 0.9 %
10 SYRINGE (ML) INJECTION
Status: CANCELLED | OUTPATIENT
Start: 2019-08-29

## 2019-08-29 RX ORDER — HEPARIN 100 UNIT/ML
500 SYRINGE INTRAVENOUS
Status: CANCELLED | OUTPATIENT
Start: 2019-08-29

## 2019-08-29 NOTE — PROGRESS NOTES
"Subjective:      Patient ID: Jaime Motta is a 53 y.o. male.    Oncology History:     Malignant neoplasm of left lung stage 4    4/30/2017 Imaging Significant Findings     CT Thoracic spine shows posterior midline mass athethe T 23-L1 with soft tissue mass 3.5 cm          5/19/2017 Surgery     Neurosx at [facility] with Dr Harry Thoracic and lumbar laminetomy at T12-L1           5/22/2017 Imaging Significant Findings     MRI Thoracic Spine done for back pain showing large erosive mass in upper lumbar spine         5/24/2017 Pathology Significant Finding     Met adenoca CK7 + full path report not available from [facility]          8/10/2017 Pathology Significant Finding     Cytology LML mass adenoca well differeniated, TTF1+    EGRF/ MMR/MSi Not Detected         9/7/2017 - 3/1/2018 Chemotherapy       Carbo/Alimta cycle 1  Carbo/Alimta/Keytruda cycle 2 - 6         2/28/2019 Imaging Significant Findings     CT C/A/P stable disease as compared to 10/26/18              Chief Complaint: Lung Cancer    Jaime Motta is here for OP PEMBROLIZUMAB 200MG Q3W      Treatment Goal:   Palliative      Status:   Active      Start Date:   3/23/2018      End Date:   10/31/2019 (Planned)      Provider:   Faith Swain NP      Chemotherapy:   pembrolizumab (KEYTRUDA) 200 mg in sodium chloride 0.9% 100 mL chemo infusion, 200 mg, Intravenous, Clinic/HOD 1 time, 23 of 27 cycles    Mr Motta has metastatic lung cancer and has been on Keytruda q 3 weeks since 3/23/18 and xgeva q 6 weeks. So far the patient appears to tolerate chemotherapy fairly well.  The patient does have mild fatigue.  Today the main concern is a recent fall last week due to "hip gave out" and increased back pain, nora on the left. Recent Ct Scans on 8/6/19 show stable disease with no new lytic lesions. Of note, he does have lytic and asclerotic lesion of L1 And L3 as well as right iliac wing but they remain stable.  We will increase his percocet to 7.5/325mg TID for improved pain " control.         Past Medical History:   Diagnosis Date    Bone cancer     Cancer     Lung cancer     Thyroid disease      Family History   Problem Relation Age of Onset    Bladder Cancer Mother     Cancer Father     Rectal cancer Brother     Lung cancer Brother     Pancreatic cancer Other     Esophageal cancer Other      Social History     Socioeconomic History    Marital status: Single     Spouse name: Not on file    Number of children: Not on file    Years of education: Not on file    Highest education level: Not on file   Occupational History    Not on file   Social Needs    Financial resource strain: Not on file    Food insecurity:     Worry: Not on file     Inability: Not on file    Transportation needs:     Medical: Not on file     Non-medical: Not on file   Tobacco Use    Smoking status: Current Every Day Smoker     Packs/day: 0.25     Years: 30.00     Pack years: 7.50    Smokeless tobacco: Never Used   Substance and Sexual Activity    Alcohol use: Yes     Alcohol/week: 1.8 oz     Types: 3 Cans of beer per week     Comment: occasionally    Drug use: No    Sexual activity: Not on file   Lifestyle    Physical activity:     Days per week: Not on file     Minutes per session: Not on file    Stress: Not on file   Relationships    Social connections:     Talks on phone: Not on file     Gets together: Not on file     Attends Caodaism service: Not on file     Active member of club or organization: Not on file     Attends meetings of clubs or organizations: Not on file     Relationship status: Not on file   Other Topics Concern    Not on file   Social History Narrative    Not on file     Past Surgical History:   Procedure Laterality Date    BACK SURGERY      FINGER SURGERY      HERNIA REPAIR      LUNG BIOPSY      PORTACATH PLACEMENT             Review of systems:  Review of Systems   Constitutional: Positive for activity change. Negative for chills, fever and unexpected weight  change.   HENT: Negative for dental problem, mouth sores, sore throat and trouble swallowing.    Eyes: Negative for visual disturbance.   Respiratory: Negative for cough, chest tightness and shortness of breath.    Cardiovascular: Negative for chest pain, palpitations and leg swelling.   Gastrointestinal: Negative for abdominal distention, abdominal pain, blood in stool, constipation, diarrhea, nausea, rectal pain and vomiting.   Genitourinary: Negative for dysuria and hematuria.   Musculoskeletal: Positive for arthralgias, back pain, gait problem (left hip pain and weakness ) and neck pain (pt c/o pain to his right shoulder and neck area; reports radiates down right arm; reports he has been trying to cut back on painting to see if this alleviates pain). Negative for joint swelling and myalgias.   Skin: Negative for pallor and rash.   Neurological: Negative for dizziness, syncope, weakness, light-headedness, numbness and headaches.   Hematological: Negative for adenopathy. Does not bruise/bleed easily.   Psychiatric/Behavioral: Negative for agitation and suicidal ideas.       Objective:     Physical Exam   Constitutional: He is oriented to person, place, and time. He appears well-developed and well-nourished.   Neck: Normal range of motion.   Cardiovascular: Normal rate and regular rhythm.   Pulmonary/Chest: Effort normal and breath sounds normal.   Musculoskeletal: He exhibits tenderness.        Right shoulder: He exhibits decreased range of motion and tenderness.   Neurological: He is alert and oriented to person, place, and time.   Psychiatric: He has a normal mood and affect.     Vitals:    08/29/19 1000   BP: 113/78   Pulse: 68   Resp: 12   Temp: 97.9 °F (36.6 °C)   Body surface area is 1.79 meters squared.    Labs:  6/27/19 WBC 5.5  HGB 13.4  Cr 1.01 LFT WNL  TSH 0.18    Assessment:      1. Malignant neoplasm of left lung stage 4    2. Metastatic cancer to bone    3. Cancer related pain           Plan:    There are no diagnoses linked to this encounter.  1. Ct scans discussed with patient showing stable disease. Will continue with Keytruda q 3 weeks and xgeva q 6 weeks as planned. Will increase his percocet to 7.5/325 in hopes  Of better pain control.   2. RTC in 3 weeks with labs.     Faith Swain, ANP

## 2019-09-03 ENCOUNTER — OFFICE VISIT (OUTPATIENT)
Dept: HEMATOLOGY/ONCOLOGY | Facility: CLINIC | Age: 54
End: 2019-09-03
Payer: MEDICAID

## 2019-09-03 VITALS
DIASTOLIC BLOOD PRESSURE: 72 MMHG | RESPIRATION RATE: 16 BRPM | OXYGEN SATURATION: 95 % | HEART RATE: 103 BPM | BODY MASS INDEX: 26.32 KG/M2 | TEMPERATURE: 99 F | HEIGHT: 64 IN | SYSTOLIC BLOOD PRESSURE: 104 MMHG | WEIGHT: 154.19 LBS

## 2019-09-03 DIAGNOSIS — K52.9 COLITIS: Primary | ICD-10-CM

## 2019-09-03 DIAGNOSIS — C34.92 MALIGNANT NEOPLASM OF LEFT LUNG STAGE 4: ICD-10-CM

## 2019-09-03 DIAGNOSIS — R19.7 DIARRHEA, UNSPECIFIED TYPE: ICD-10-CM

## 2019-09-03 PROCEDURE — 99214 OFFICE O/P EST MOD 30 MIN: CPT | Mod: S$GLB,,, | Performed by: NURSE PRACTITIONER

## 2019-09-03 PROCEDURE — 99214 PR OFFICE/OUTPT VISIT, EST, LEVL IV, 30-39 MIN: ICD-10-PCS | Mod: S$GLB,,, | Performed by: NURSE PRACTITIONER

## 2019-09-03 RX ORDER — PREDNISONE 10 MG/1
5 TABLET ORAL DAILY
Qty: 5 TABLET | Refills: 0 | Status: SHIPPED | OUTPATIENT
Start: 2019-09-03 | End: 2019-09-08

## 2019-09-03 RX ORDER — PREDNISONE 20 MG/1
TABLET ORAL
Qty: 55 TABLET | Refills: 0 | Status: SHIPPED | OUTPATIENT
Start: 2019-09-03 | End: 2019-10-03

## 2019-09-03 NOTE — PROGRESS NOTES
Subjective:      Patient ID: Jaime Motta is a 53 y.o. male.    Oncology History:     Malignant neoplasm of left lung stage 4    4/30/2017 Imaging Significant Findings     CT Thoracic spine shows posterior midline mass athethe T 23-L1 with soft tissue mass 3.5 cm          5/19/2017 Surgery     Neurosx at [facility] with Dr Harry Thoracic and lumbar laminetomy at T12-L1           5/22/2017 Imaging Significant Findings     MRI Thoracic Spine done for back pain showing large erosive mass in upper lumbar spine         5/24/2017 Pathology Significant Finding     Met adenoca CK7 + full path report not available from [facility]          8/10/2017 Pathology Significant Finding     Cytology LML mass adenoca well differeniated, TTF1+    EGRF/ MMR/MSi Not Detected         9/7/2017 - 3/1/2018 Chemotherapy       Carbo/Alimta cycle 1  Carbo/Alimta/Keytruda cycle 2 - 6         2/28/2019 Imaging Significant Findings     CT C/A/P stable disease as compared to 10/26/18              Chief Complaint: Lung Cancer (Follow up from hospital visit.)    Jaime Motta is here for OP PEMBROLIZUMAB 200MG Q3W      Treatment Goal:   Palliative      Status:   Active      Start Date:   3/23/2018      End Date:   11/28/2019 (Planned)      Provider:   Faith Swain NP      Chemotherapy:   pembrolizumab (KEYTRUDA) 200 mg in sodium chloride 0.9% 100 mL chemo infusion, 200 mg, Intravenous, Clinic/HOD 1 time, 24 of 27 cycles    Mr Motta has metastatic lung cancer and has been on Keytruda q 3 weeks since 3/23/18 and xgeva q 6 weeks.   Today the main concern is a recent ED visit on 8/30/19 for new onset lower abdominal pain and diarrhea after receiving Keytruda on 8/29/19. He had a Ct a/p which showed interval development of bowel wall thickening involving the cecum measuring up to 1.8 cm in thickness. Could not r/o ischemia vis inflammation/infection. Pt walked into clinic today to discuss results of CT, pt continues to have intermittent lowe abd pain but no diarrhea  over the weekend. Will hold keytruda and prescribe high dose steroid taper for the next 6 weeks as well as imaging after he completes steroids.    IMAGIN19 CT  C/A/P: on 19 show stable disease with no new lytic lesions. Of note, he does have lytic and asclerotic lesion of L1 And L3 as well as right iliac wing but they remain stable.  We will increase his percocet to 7.5/325mg TID for improved pain control.         Past Medical History:   Diagnosis Date    Bone cancer     Cancer     Lung cancer     Thyroid disease      Family History   Problem Relation Age of Onset    Bladder Cancer Mother     Cancer Father     Rectal cancer Brother     Lung cancer Brother     Pancreatic cancer Other     Esophageal cancer Other      Social History     Socioeconomic History    Marital status: Single     Spouse name: Not on file    Number of children: Not on file    Years of education: Not on file    Highest education level: Not on file   Occupational History    Not on file   Social Needs    Financial resource strain: Not on file    Food insecurity:     Worry: Not on file     Inability: Not on file    Transportation needs:     Medical: Not on file     Non-medical: Not on file   Tobacco Use    Smoking status: Current Every Day Smoker     Packs/day: 0.25     Years: 30.00     Pack years: 7.50    Smokeless tobacco: Never Used   Substance and Sexual Activity    Alcohol use: Yes     Alcohol/week: 1.8 oz     Types: 3 Cans of beer per week     Comment: occasionally    Drug use: No    Sexual activity: Not on file   Lifestyle    Physical activity:     Days per week: Not on file     Minutes per session: Not on file    Stress: Not on file   Relationships    Social connections:     Talks on phone: Not on file     Gets together: Not on file     Attends [employer] service: Not on file     Active member of club or organization: Not on file     Attends meetings of clubs or organizations: Not on file     Relationship  status: Not on file   Other Topics Concern    Not on file   Social History Narrative    Not on file     Past Surgical History:   Procedure Laterality Date    BACK SURGERY      FINGER SURGERY      HERNIA REPAIR      LUNG BIOPSY      PORTACATH PLACEMENT             Review of systems:  Review of Systems   Constitutional: Positive for activity change. Negative for chills, fever and unexpected weight change.   HENT: Negative for dental problem, mouth sores, sore throat and trouble swallowing.    Eyes: Negative for visual disturbance.   Respiratory: Negative for cough, chest tightness and shortness of breath.    Cardiovascular: Negative for chest pain, palpitations and leg swelling.   Gastrointestinal: Positive for abdominal distention, abdominal pain and diarrhea. Negative for blood in stool, constipation, nausea, rectal pain and vomiting.   Genitourinary: Negative for dysuria and hematuria.   Musculoskeletal: Positive for arthralgias, back pain, gait problem (left hip pain and weakness ) and neck pain (pt c/o pain to his right shoulder and neck area; reports radiates down right arm; reports he has been trying to cut back on painting to see if this alleviates pain). Negative for joint swelling and myalgias.   Skin: Negative for pallor and rash.   Neurological: Negative for dizziness, syncope, weakness, light-headedness, numbness and headaches.   Hematological: Negative for adenopathy. Does not bruise/bleed easily.   Psychiatric/Behavioral: Negative for agitation and suicidal ideas.       Objective:     Physical Exam   Constitutional: He is oriented to person, place, and time. He appears well-developed and well-nourished.   Neck: Normal range of motion.   Cardiovascular: Normal rate and regular rhythm.   Pulmonary/Chest: Effort normal and breath sounds normal.   Musculoskeletal: He exhibits tenderness.        Right shoulder: He exhibits decreased range of motion and tenderness.   Neurological: He is alert and  oriented to person, place, and time.   Psychiatric: He has a normal mood and affect.     Vitals:    09/03/19 1344   BP: 104/72   Pulse: 103   Resp: 16   Temp: 98.9 °F (37.2 °C)   Body surface area is 1.78 meters squared.    Labs:  6/27/19 WBC 5.5  HGB 13.4  Cr 1.01 LFT WNL  TSH 0.18    Assessment:      1. Colitis    2. Diarrhea, unspecified type    3. Malignant neoplasm of left lung stage 4           Plan:   Colitis  -     predniSONE (DELTASONE) 20 MG tablet; Take 4 tablets (80 mg total) by mouth once daily for 5 days, THEN 3 tablets (60 mg total) once daily for 5 days, THEN 2 tablets (40 mg total) once daily for 5 days, THEN 1 tablet (20 mg total) once daily for 5 days, THEN 0.5 tablets (10 mg total) once daily for 5 days, THEN 0.5 tablets (10 mg total) once daily for 5 days.  Dispense: 55 tablet; Refill: 0  -     predniSONE (DELTASONE) 10 MG tablet; Take 0.5 tablets (5 mg total) by mouth once daily. Week 6 of taper for 5 days  Dispense: 5 tablet; Refill: 0    Diarrhea, unspecified type    Malignant neoplasm of left lung stage 4      1.Discussed questionable immune related colitis.  2. Will hold keytruda and prescribe steroid taper,  3. Currently no c/o diarrhea but advised to call if it occurs.  4. Low abd pain intermittent, will see pt again on 917/19 to evaluate.    Faith Swain, ANP

## 2019-09-20 ENCOUNTER — OFFICE VISIT (OUTPATIENT)
Dept: HEMATOLOGY/ONCOLOGY | Facility: CLINIC | Age: 54
End: 2019-09-20
Payer: MEDICAID

## 2019-09-20 VITALS
SYSTOLIC BLOOD PRESSURE: 128 MMHG | BODY MASS INDEX: 26.87 KG/M2 | RESPIRATION RATE: 16 BRPM | OXYGEN SATURATION: 96 % | HEIGHT: 64 IN | WEIGHT: 157.38 LBS | DIASTOLIC BLOOD PRESSURE: 87 MMHG | HEART RATE: 64 BPM | TEMPERATURE: 97 F

## 2019-09-20 DIAGNOSIS — K52.9 COLITIS: Primary | ICD-10-CM

## 2019-09-20 DIAGNOSIS — G89.3 CANCER RELATED PAIN: ICD-10-CM

## 2019-09-20 DIAGNOSIS — C34.92 MALIGNANT NEOPLASM OF LEFT LUNG STAGE 4: ICD-10-CM

## 2019-09-20 LAB
ALBUMIN SERPL BCP-MCNC: 3.5 G/DL (ref 3.4–5)
ALBUMIN/GLOBULIN RATIO: 0.97 RATIO (ref 1.1–1.8)
ALP SERPL-CCNC: 51 U/L (ref 46–116)
ALT SERPL W P-5'-P-CCNC: 33 U/L (ref 12–78)
AST SERPL-CCNC: 13 U/L (ref 15–37)
BASOPHILS NFR SNV MANUAL: 0.2 % (ref 0–3)
BILIRUB SERPL-MCNC: 0.3 MG/DL (ref 0–1)
BUN SERPL-MCNC: 13 MG/DL (ref 7–18)
BUN/CREAT SERPL: 14.77 RATIO (ref 7–18)
CALCIUM SERPL-MCNC: 9.2 MG/DL (ref 8.8–10.5)
CHLORIDE SERPL-SCNC: 104 MMOL/L (ref 100–108)
CO2 SERPL-SCNC: 29 MMOL/L (ref 21–32)
CREAT SERPL-MCNC: 0.88 MG/DL (ref 0.7–1.3)
EOSINOPHIL NFR SNV MANUAL: 0.4 % (ref 1–3)
ERYTHROCYTE [DISTWIDTH] IN BLOOD BY AUTOMATED COUNT: 16.5 % (ref 12.5–18)
GFR ESTIMATION: > 60
GLOBULIN: 3.6 G/DL (ref 2.3–3.5)
GLUCOSE SERPL-MCNC: 92 MG/DL (ref 70–110)
HCT VFR BLD AUTO: 40.7 % (ref 42–52)
HGB BLD-MCNC: 13 G/DL (ref 14–18)
LYMPHOCYTES NFR SNV MANUAL: 16.1 % (ref 25–40)
MANUAL NRBC PER 100 CELLS: 0 %
MCH RBC QN AUTO: 31.3 PG (ref 27–31.2)
MCHC RBC AUTO-ENTMCNC: 31.9 G/DL (ref 31.8–35.4)
MCV RBC AUTO: 97.8 FL (ref 80–97)
MONOCYTES/100 LEUKOCYTES: 5 % (ref 1–15)
NEUTROPHILS NFR BLD: 7.69 10*3/UL (ref 1.8–7.7)
NEUTROPHILS NFR SNV MANUAL: 76.9 % (ref 37–80)
PLATELETS: 106 10*3/UL (ref 142–424)
POTASSIUM SERPL-SCNC: 3.9 MMOL/L (ref 3.6–5.2)
PROT SERPL-MCNC: 7.1 G/DL (ref 6.4–8.2)
RBC # BLD AUTO: 4.16 10*6/UL (ref 4.7–6.1)
SODIUM BLD-SCNC: 138 MMOL/L (ref 135–145)
TSH SERPL DL<=0.005 MIU/L-ACNC: 0.43 UIU/ML (ref 0.36–3.74)
WBC # BLD: 10 10*3/UL (ref 4.6–10.2)

## 2019-09-20 PROCEDURE — 99214 PR OFFICE/OUTPT VISIT, EST, LEVL IV, 30-39 MIN: ICD-10-PCS | Mod: S$GLB,,, | Performed by: NURSE PRACTITIONER

## 2019-09-20 PROCEDURE — 99214 OFFICE O/P EST MOD 30 MIN: CPT | Mod: S$GLB,,, | Performed by: NURSE PRACTITIONER

## 2019-09-20 NOTE — PROGRESS NOTES
Subjective:      Patient ID: Jaime Motta is a 53 y.o. male.    Oncology History:     Malignant neoplasm of left lung stage 4    4/30/2017 Imaging Significant Findings     CT Thoracic spine shows posterior midline mass athethe T 23-L1 with soft tissue mass 3.5 cm          5/19/2017 Surgery     Neurosx at [facility] with Dr Harry Thoracic and lumbar laminetomy at T12-L1           5/22/2017 Imaging Significant Findings     MRI Thoracic Spine done for back pain showing large erosive mass in upper lumbar spine         5/24/2017 Pathology Significant Finding     Met adenoca CK7 + full path report not available from [facility]          8/10/2017 Pathology Significant Finding     Cytology LML mass adenoca well differeniated, TTF1+    EGRF/ MMR/MSi Not Detected         9/7/2017 - 3/1/2018 Chemotherapy       Carbo/Alimta cycle 1  Carbo/Alimta/Keytruda cycle 2 - 6         2/28/2019 Imaging Significant Findings     CT C/A/P stable disease as compared to 10/26/18              Chief Complaint: Lung Cancer    Jaime Motta is here for OP PEMBROLIZUMAB 200MG Q3W      Treatment Goal:   Palliative      Status:   Active      Start Date:   3/23/2018      End Date:   11/28/2019 (Planned)      Provider:   Faith Swain NP      Chemotherapy:   pembrolizumab (KEYTRUDA) 200 mg in sodium chloride 0.9% 100 mL chemo infusion, 200 mg, Intravenous, Clinic/HOD 1 time, 24 of 27 cycles    Mr Motta has metastatic lung cancer and has been on Keytruda q 3 weeks since 3/23/18 and xgeva q 6 weeks.   Today the main concern is a recent ED visit on 8/30/19 for new onset lower abdominal pain and diarrhea after receiving Keytruda on 8/29/19. He had a Ct a/p which showed interval development of bowel wall thickening involving the cecum measuring up to 1.8 cm in thickness. Could not r/o ischemia vis inflammation/infection. Pt walked into clinic today to discuss results of CT, pt continues to have intermittent lowe abd pain but no diarrhea over the weekend. Will hold  keytruda and prescribe high dose steroid taper for the next 6 weeks as well as imaging after he completes steroids.    19 Visit;  Today in clinic, he has no c/o diarrhea or abdominal pain. He is currently week 5 of 6 week taper of steroids. He states he is feeling good. We will repeat Ct scan to ensure colitis resolved prior to resuming keytruda once he has completed his steroids.     IMAGIN19 CT  C/A/P: on 19 show stable disease with no new lytic lesions. Of note, he does have lytic and asclerotic lesion of L1 And L3 as well as right iliac wing but they remain stable.  We will increase his percocet to 7.5/325mg TID for improved pain control.         Past Medical History:   Diagnosis Date    Bone cancer     Cancer     Lung cancer     Thyroid disease      Family History   Problem Relation Age of Onset    Bladder Cancer Mother     Cancer Father     Rectal cancer Brother     Lung cancer Brother     Pancreatic cancer Other     Esophageal cancer Other      Social History     Socioeconomic History    Marital status: Single     Spouse name: Not on file    Number of children: Not on file    Years of education: Not on file    Highest education level: Not on file   Occupational History    Not on file   Social Needs    Financial resource strain: Not on file    Food insecurity:     Worry: Not on file     Inability: Not on file    Transportation needs:     Medical: Not on file     Non-medical: Not on file   Tobacco Use    Smoking status: Current Every Day Smoker     Packs/day: 0.25     Years: 30.00     Pack years: 7.50    Smokeless tobacco: Never Used   Substance and Sexual Activity    Alcohol use: Yes     Alcohol/week: 1.8 oz     Types: 3 Cans of beer per week     Comment: occasionally    Drug use: No    Sexual activity: Not on file   Lifestyle    Physical activity:     Days per week: Not on file     Minutes per session: Not on file    Stress: Not on file   Relationships    Social  connections:     Talks on phone: Not on file     Gets together: Not on file     Attends Faith service: Not on file     Active member of club or organization: Not on file     Attends meetings of clubs or organizations: Not on file     Relationship status: Not on file   Other Topics Concern    Not on file   Social History Narrative    Not on file     Past Surgical History:   Procedure Laterality Date    BACK SURGERY      FINGER SURGERY      HERNIA REPAIR      LUNG BIOPSY      PORTACATH PLACEMENT             Review of systems:  Review of Systems   Constitutional: Positive for activity change. Negative for chills, fever and unexpected weight change.   HENT: Negative for dental problem, mouth sores, sore throat and trouble swallowing.    Eyes: Negative for visual disturbance.   Respiratory: Negative for cough, chest tightness and shortness of breath.    Cardiovascular: Negative for chest pain, palpitations and leg swelling.   Gastrointestinal: Positive for abdominal distention, abdominal pain and diarrhea. Negative for blood in stool, constipation, nausea, rectal pain and vomiting.   Genitourinary: Negative for dysuria and hematuria.   Musculoskeletal: Positive for arthralgias, back pain, gait problem (left hip pain and weakness ) and neck pain (pt c/o pain to his right shoulder and neck area; reports radiates down right arm; reports he has been trying to cut back on painting to see if this alleviates pain). Negative for joint swelling and myalgias.   Skin: Negative for pallor and rash.   Neurological: Negative for dizziness, syncope, weakness, light-headedness, numbness and headaches.   Hematological: Negative for adenopathy. Does not bruise/bleed easily.   Psychiatric/Behavioral: Negative for agitation and suicidal ideas.       Objective:     Physical Exam   Constitutional: He is oriented to person, place, and time. He appears well-developed and well-nourished.   Neck: Normal range of motion.    Cardiovascular: Normal rate and regular rhythm.   Pulmonary/Chest: Effort normal and breath sounds normal.   Musculoskeletal: He exhibits tenderness.        Right shoulder: He exhibits decreased range of motion and tenderness.   Neurological: He is alert and oriented to person, place, and time.   Psychiatric: He has a normal mood and affect.     Vitals:    09/20/19 0815   BP: 128/87   Pulse: 64   Resp: 16   Temp: 97.2 °F (36.2 °C)   Body surface area is 1.8 meters squared.    Labs:  6/27/19 WBC 5.5  HGB 13.4  Cr 1.01 LFT WNL  TSH 0.18    Assessment:      1. Colitis    2. Malignant neoplasm of left lung stage 4    3. Cancer related pain           Plan:   There are no diagnoses linked to this encounter.  1.Pt now on daily steriods, to complete taper in 1 week.  2. Continue to hold keytruda until steroid complete.  3. Currently no c/o diarrhea but advised to call if it occurs.  4. CT abd/pevis to reassess colitis prior to restarting IO.   5. RTC in 1 month with labs.     Faith Swain, ANP

## 2019-10-07 ENCOUNTER — TELEPHONE (OUTPATIENT)
Dept: HEMATOLOGY/ONCOLOGY | Facility: CLINIC | Age: 54
End: 2019-10-07

## 2019-10-07 DIAGNOSIS — C79.51 METASTATIC CANCER TO BONE: ICD-10-CM

## 2019-10-07 DIAGNOSIS — C34.92 MALIGNANT NEOPLASM OF LEFT LUNG STAGE 4: ICD-10-CM

## 2019-10-07 RX ORDER — OXYCODONE AND ACETAMINOPHEN 7.5; 325 MG/1; MG/1
1 TABLET ORAL EVERY 8 HOURS PRN
Qty: 90 TABLET | Refills: 0 | Status: SHIPPED | OUTPATIENT
Start: 2019-10-07 | End: 2019-11-07 | Stop reason: SDUPTHER

## 2019-10-07 NOTE — TELEPHONE ENCOUNTER
Competed    ----- Message from Rosio Adames sent at 10/7/2019 10:12 AM CDT -----  Contact: patient  Needs refill on pain medication (does not know the name). [facility] on [address]

## 2019-10-18 ENCOUNTER — OFFICE VISIT (OUTPATIENT)
Dept: HEMATOLOGY/ONCOLOGY | Facility: CLINIC | Age: 54
End: 2019-10-18
Payer: MEDICAID

## 2019-10-18 VITALS
TEMPERATURE: 99 F | SYSTOLIC BLOOD PRESSURE: 145 MMHG | HEART RATE: 93 BPM | HEIGHT: 64 IN | RESPIRATION RATE: 16 BRPM | OXYGEN SATURATION: 93 % | BODY MASS INDEX: 26.91 KG/M2 | DIASTOLIC BLOOD PRESSURE: 83 MMHG | WEIGHT: 157.63 LBS

## 2019-10-18 DIAGNOSIS — C34.92 MALIGNANT NEOPLASM OF LEFT LUNG STAGE 4: Primary | ICD-10-CM

## 2019-10-18 DIAGNOSIS — G89.3 CANCER RELATED PAIN: ICD-10-CM

## 2019-10-18 DIAGNOSIS — C79.51 METASTATIC CANCER TO BONE: ICD-10-CM

## 2019-10-18 DIAGNOSIS — E03.9 HYPOTHYROIDISM, UNSPECIFIED TYPE: ICD-10-CM

## 2019-10-18 LAB
ALBUMIN SERPL BCP-MCNC: 3.7 G/DL (ref 3.4–5)
ALBUMIN/GLOBULIN RATIO: 1.03 RATIO (ref 1.1–1.8)
ALP SERPL-CCNC: 73 U/L (ref 46–116)
ALT SERPL W P-5'-P-CCNC: 29 U/L (ref 12–78)
AST SERPL-CCNC: 31 U/L (ref 15–37)
BASOPHILS NFR SNV MANUAL: 0.2 % (ref 0–3)
BILIRUB SERPL-MCNC: 0.6 MG/DL (ref 0–1)
BUN SERPL-MCNC: 9 MG/DL (ref 7–18)
BUN/CREAT SERPL: 7.08 RATIO (ref 7–18)
CALCIUM SERPL-MCNC: 9.7 MG/DL (ref 8.8–10.5)
CHLORIDE SERPL-SCNC: 98 MMOL/L (ref 100–108)
CO2 SERPL-SCNC: 27 MMOL/L (ref 21–32)
CREAT SERPL-MCNC: 1.27 MG/DL (ref 0.7–1.3)
EOSINOPHIL NFR SNV MANUAL: 2.4 % (ref 1–3)
ERYTHROCYTE [DISTWIDTH] IN BLOOD BY AUTOMATED COUNT: 15.4 % (ref 12.5–18)
GFR ESTIMATION: > 60
GLOBULIN: 3.6 G/DL (ref 2.3–3.5)
GLUCOSE SERPL-MCNC: 109 MG/DL (ref 70–110)
HCT VFR BLD AUTO: 37.1 % (ref 42–52)
HGB BLD-MCNC: 12.5 G/DL (ref 14–18)
LYMPHOCYTES NFR SNV MANUAL: 27.8 % (ref 25–40)
MANUAL NRBC PER 100 CELLS: 0 %
MCH RBC QN AUTO: 32 PG (ref 27–31.2)
MCHC RBC AUTO-ENTMCNC: 33.7 G/DL (ref 31.8–35.4)
MCV RBC AUTO: 94.9 FL (ref 80–97)
MONOCYTES/100 LEUKOCYTES: 9.6 % (ref 1–15)
NEUTROPHILS NFR BLD: 2.52 10*3/UL (ref 1.8–7.7)
NEUTROPHILS NFR SNV MANUAL: 59.3 % (ref 37–80)
PLATELETS: 105 10*3/UL (ref 142–424)
POTASSIUM SERPL-SCNC: 3.6 MMOL/L (ref 3.6–5.2)
PROT SERPL-MCNC: 7.3 G/DL (ref 6.4–8.2)
RBC # BLD AUTO: 3.91 10*6/UL (ref 4.7–6.1)
SODIUM BLD-SCNC: 136 MMOL/L (ref 135–145)
TSH SERPL DL<=0.005 MIU/L-ACNC: 8.58 UIU/ML (ref 0.36–3.74)
WBC # BLD: 4.3 10*3/UL (ref 4.6–10.2)

## 2019-10-18 PROCEDURE — 99214 OFFICE O/P EST MOD 30 MIN: CPT | Mod: S$GLB,,, | Performed by: NURSE PRACTITIONER

## 2019-10-18 PROCEDURE — 99214 PR OFFICE/OUTPT VISIT, EST, LEVL IV, 30-39 MIN: ICD-10-PCS | Mod: S$GLB,,, | Performed by: NURSE PRACTITIONER

## 2019-10-18 RX ORDER — HEPARIN 100 UNIT/ML
500 SYRINGE INTRAVENOUS
Status: CANCELLED | OUTPATIENT
Start: 2019-10-22

## 2019-10-18 RX ORDER — LEVOTHYROXINE SODIUM 175 UG/1
175 TABLET ORAL DAILY
Qty: 30 TABLET | Refills: 11 | Status: SHIPPED | OUTPATIENT
Start: 2019-10-18 | End: 2020-01-16

## 2019-10-18 RX ORDER — SODIUM CHLORIDE 0.9 % (FLUSH) 0.9 %
10 SYRINGE (ML) INJECTION
Status: CANCELLED | OUTPATIENT
Start: 2019-10-22

## 2019-10-18 NOTE — PROGRESS NOTES
Subjective:      Patient ID: Jaime Motta is a 54 y.o. male.    Oncology History:     Malignant neoplasm of left lung stage 4    4/30/2017 Imaging Significant Findings     CT Thoracic spine shows posterior midline mass athethe T 23-L1 with soft tissue mass 3.5 cm       5/19/2017 Surgery     Neurosx at [facility] with Dr Harry Thoracic and lumbar laminetomy at T12-L1        5/22/2017 Imaging Significant Findings     MRI Thoracic Spine done for back pain showing large erosive mass in upper lumbar spine      5/24/2017 Pathology Significant Finding     Met adenoca CK7 + full path report not available from [facility]       8/10/2017 Pathology Significant Finding     Cytology LML mass adenoca well differeniated, TTF1+    EGRF/ MMR/MSi Not Detected      9/7/2017 - 3/1/2018 Chemotherapy       Carbo/Alimta cycle 1  Carbo/Alimta/Keytruda cycle 2 - 6      2/28/2019 Imaging Significant Findings     CT C/A/P stable disease as compared to 10/26/18           Chief Complaint: Lung Cancer    Jaime Motta is here for OP PEMBROLIZUMAB 200MG Q3W      Treatment Goal:   Palliative      Status:   Active      Start Date:   3/23/2018      End Date:   11/28/2019 (Planned)      Provider:   Faith Swain NP      Chemotherapy:   pembrolizumab (KEYTRUDA) 200 mg in sodium chloride 0.9% 100 mL chemo infusion, 200 mg, Intravenous, Clinic/HOD 1 time, 24 of 27 cycles    Mr Motta has metastatic lung cancer and has been on Keytruda q 3 weeks since 3/23/18 and xgeva q 6 weeks.   Today the main concern is a recent ED visit on 8/30/19 for new onset lower abdominal pain and diarrhea after receiving Keytruda on 8/29/19. He had a Ct a/p which showed interval development of bowel wall thickening involving the cecum measuring up to 1.8 cm in thickness. Could not r/o ischemia vis inflammation/infection. Pt walked into clinic today to discuss results of CT, pt continues to have intermittent lowe abd pain but no diarrhea over the weekend. Will hold keytruda and prescribe high  dose steroid taper for the next 6 weeks as well as imaging after he completes steroids.    19 Visit;  Today in clinic, he has no c/o diarrhea or abdominal pain. He is currently week 5 of 6 week taper of steroids. He states he is feeling good. We will repeat Ct scan to ensure colitis resolved prior to resuming keytruda once he has completed his steroids.     10/18/19 Visit:  Pt has completed steroid taper and states feeling good. Diarrhea has resolved and recent CT scan shows DOMENIC for colitis.     IMAGIN19 CT  C/A/P: on 19 show stable disease with no new lytic lesions. Of note, he does have lytic and asclerotic lesion of L1 And L3 as well as right iliac wing but they remain stable.  We will increase his percocet to 7.5/325mg TID for improved pain control.   CT Abdomen Pelvis  Without Contrast                                RADIOLOGY REPORT        PT NAME: AISHA GREENE      [facility]     : 1965  54             [address]    ACCT: [id number]                                              [facility] Rec #: [id number]                                        [id number]    [facility]             Procedure: CT ABD   PELVIS WO/W     CONTRAST    REQUISITION #: [id number]      REPORT #: [id number]           DATE OF EXAM: 10/10/19    TIME OF EXAM: 0930       CMS MANDATED QUALITY DATA - CT RADIATION ? 436        All CT scans at this facility utilize dose modulation, iterative     reconstruction, and/or weight based dosing when appropriate to reduce     radiation dose to as low as reasonably achievable.        HISTORY:Lung cancer. Colitis.        COMPARISON:2019.        TECHNIQUE: A CT of the abdomen and pelvis was performed following the     administration of oral contrast and prior to and following administration of    100 cc of Isovue-300 intravenous contrast Estimated radiation dose is  20      mSv. All CT scans at this facility  uses dose modulation and/or weight base     dosing when appropriate to reduce radiation dose to as low as reasonably     achievable.        FINDINGS: The liver, gallbladder, pancreas and spleen are unremarkable.     Adrenal glands are normal in appearance. No amount of the kidneys.        There is a small hiatal hernia. Bowel wall thickening of the cecum near the     ileocecal valve is decreased since prior study. No abscess or free air. No     abnormally enlarged or necrotic lymph nodes.        Sclerotic bone lesions of L1 and L3 consistent with metastatic disease are     unchanged. Arthritic changes of both sacroiliac joints and ill-defined     sclerosis of the right iliac bone are stable.        IMPRESSION:        Interval decrease in wall thickening of the cecum. Finding is of uncertain     etiology.        Stable metastatic disease to bone.        DICTATING PHYSICIAN:  LUIS LEÓN MD                   Date Dictated: 10/10/19 0939        Signed By:  LUIS LEÓN MD <Electronically signed by LUIS LEÓN MD in      OV>    Date Signed:  10/10/19 0945     CC: ANURAG REDDY NP ; ANURAG REDDY NP      ADMITTING PHYSICIAN:                                                                                                     ORDERING PHY: ANURAG REDDY NP                                                                                                                                                          ATTENDING PHY: ANURAG REDDY NP    Patient Status:  REG CLI    Admit Service Date: 10/10/19             Past Medical History:   Diagnosis Date    Bone cancer     Cancer     Lung cancer     Thyroid disease      Family History   Problem Relation Age of Onset    Bladder Cancer Mother     Cancer Father     Rectal cancer Brother     Lung cancer Brother     Pancreatic cancer Other     Esophageal cancer Other      Social History     Socioeconomic History    Marital status: Single     Spouse name: Not on file     Number of children: Not on file    Years of education: Not on file    Highest education level: Not on file   Occupational History    Not on file   Social Needs    Financial resource strain: Not on file    Food insecurity:     Worry: Not on file     Inability: Not on file    Transportation needs:     Medical: Not on file     Non-medical: Not on file   Tobacco Use    Smoking status: Current Every Day Smoker     Packs/day: 0.25     Years: 30.00     Pack years: 7.50    Smokeless tobacco: Never Used   Substance and Sexual Activity    Alcohol use: Yes     Alcohol/week: 3.0 standard drinks     Types: 3 Cans of beer per week     Comment: occasionally    Drug use: No    Sexual activity: Not on file   Lifestyle    Physical activity:     Days per week: Not on file     Minutes per session: Not on file    Stress: Not on file   Relationships    Social connections:     Talks on phone: Not on file     Gets together: Not on file     Attends Oriental orthodox service: Not on file     Active member of club or organization: Not on file     Attends meetings of clubs or organizations: Not on file     Relationship status: Not on file   Other Topics Concern    Not on file   Social History Narrative    Not on file     Past Surgical History:   Procedure Laterality Date    BACK SURGERY      FINGER SURGERY      HERNIA REPAIR      LUNG BIOPSY      PORTACATH PLACEMENT             Review of systems:  Review of Systems   Constitutional: Positive for activity change. Negative for chills, fever and unexpected weight change.   HENT: Negative for dental problem, mouth sores, sore throat and trouble swallowing.    Eyes: Negative for visual disturbance.   Respiratory: Negative for cough, chest tightness and shortness of breath.    Cardiovascular: Negative for chest pain, palpitations and leg swelling.   Gastrointestinal: Positive for abdominal distention and abdominal pain. Negative for blood in stool, constipation, diarrhea, nausea, rectal  pain and vomiting.   Genitourinary: Negative for dysuria and hematuria.   Musculoskeletal: Positive for arthralgias, back pain, gait problem (left hip pain and weakness ) and neck pain (pt c/o pain to his right shoulder and neck area; reports radiates down right arm; reports he has been trying to cut back on painting to see if this alleviates pain). Negative for joint swelling and myalgias.   Skin: Negative for pallor and rash.   Neurological: Negative for dizziness, syncope, weakness, light-headedness, numbness and headaches.   Hematological: Negative for adenopathy. Does not bruise/bleed easily.   Psychiatric/Behavioral: Negative for agitation and suicidal ideas.       Objective:     Physical Exam   Constitutional: He is oriented to person, place, and time. He appears well-developed and well-nourished.   Neck: Normal range of motion.   Cardiovascular: Normal rate and regular rhythm.   Pulmonary/Chest: Effort normal and breath sounds normal.   Musculoskeletal: He exhibits tenderness.        Right shoulder: He exhibits decreased range of motion and tenderness.   Neurological: He is alert and oriented to person, place, and time.   Psychiatric: He has a normal mood and affect.     Vitals:    10/18/19 0836   BP: (!) 145/83   Pulse: 93   Resp: 16   Temp: 98.6 °F (37 °C)   Body surface area is 1.8 meters squared.    Labs:  6/27/19 WBC 5.5  HGB 13.4  Cr 1.01 LFT WNL  TSH 0.18  Lab Results   Component Value Date    WBC 4.3 (L) 10/18/2019    HGB 12.5 (L) 10/18/2019    HCT 37.1 (L) 10/18/2019     CMP  Sodium   Date Value Ref Range Status   10/18/2019 136 135 - 145 mmol/L Final     Potassium   Date Value Ref Range Status   10/18/2019 3.6 3.6 - 5.2 mmol/L Final     Chloride   Date Value Ref Range Status   10/18/2019 98 (L) 100 - 108 mmol/L Final     CO2   Date Value Ref Range Status   10/18/2019 27 21 - 32 mmol/L Final     Glucose   Date Value Ref Range Status   10/18/2019 109 70 - 110 mg/dL Final     BUN, Bld   Date  Value Ref Range Status   10/18/2019 9 7 - 18 mg/dL Final     Creatinine   Date Value Ref Range Status   10/18/2019 1.27 0.70 - 1.30 mg/dL Final     Calcium   Date Value Ref Range Status   10/18/2019 9.7 8.8 - 10.5 mg/dL Final     Total Protein   Date Value Ref Range Status   10/18/2019 7.3 6.4 - 8.2 g/dL Final     Albumin   Date Value Ref Range Status   10/18/2019 3.7 3.4 - 5.0 g/dL Final     Total Bilirubin   Date Value Ref Range Status   10/18/2019 0.6 0.0 - 1.0 mg/dL Final     Alkaline Phosphatase   Date Value Ref Range Status   10/18/2019 73 46 - 116 U/L Final     AST   Date Value Ref Range Status   10/18/2019 31 15 - 37 U/L Final     ALT   Date Value Ref Range Status   10/18/2019 29 12 - 78 U/L Final     Lab Results   Component Value Date    TSH 8.58 (H) 10/18/2019       Assessment:      1. Malignant neoplasm of left lung stage 4    2. Metastatic cancer to bone    3. Cancer related pain    4. Hypothyroidism, unspecified type           Plan:   There are no diagnoses linked to this encounter.  1.Pt completed steriods with no c/o of recurrent diarrhea or abd pain.  2. Will resume keytruda next week.   3. Currently no c/o diarrhea but advised to call if it occurs.  4. CT abd/pevis reviewed with pt.   5. RTC in 3 weeks with labs.   6. Will increase synthroid to 175 mcg daily.    Faith Swain, ANP

## 2019-11-07 ENCOUNTER — TELEPHONE (OUTPATIENT)
Dept: HEMATOLOGY/ONCOLOGY | Facility: CLINIC | Age: 54
End: 2019-11-07

## 2019-11-07 DIAGNOSIS — C34.92 MALIGNANT NEOPLASM OF LEFT LUNG STAGE 4: ICD-10-CM

## 2019-11-07 DIAGNOSIS — C79.51 METASTATIC CANCER TO BONE: ICD-10-CM

## 2019-11-07 RX ORDER — OXYCODONE AND ACETAMINOPHEN 7.5; 325 MG/1; MG/1
1 TABLET ORAL EVERY 8 HOURS PRN
Qty: 90 TABLET | Refills: 0 | Status: SHIPPED | OUTPATIENT
Start: 2019-11-07 | End: 2019-12-03 | Stop reason: SDUPTHER

## 2019-11-07 NOTE — TELEPHONE ENCOUNTER
----- Message from Rosio Adames sent at 11/7/2019 11:00 AM CST -----  Contact: patient  Needs refill on pain meds.. Doesn't know the name of it,,,. [facility] on [address]

## 2019-11-12 ENCOUNTER — OFFICE VISIT (OUTPATIENT)
Dept: HEMATOLOGY/ONCOLOGY | Facility: CLINIC | Age: 54
End: 2019-11-12
Payer: MEDICAID

## 2019-11-12 VITALS
TEMPERATURE: 98 F | RESPIRATION RATE: 14 BRPM | SYSTOLIC BLOOD PRESSURE: 122 MMHG | BODY MASS INDEX: 27.25 KG/M2 | HEIGHT: 64 IN | DIASTOLIC BLOOD PRESSURE: 84 MMHG | WEIGHT: 159.63 LBS | HEART RATE: 86 BPM | OXYGEN SATURATION: 96 %

## 2019-11-12 DIAGNOSIS — C34.92 MALIGNANT NEOPLASM OF LEFT LUNG STAGE 4: Primary | ICD-10-CM

## 2019-11-12 DIAGNOSIS — C79.51 METASTATIC CANCER TO BONE: ICD-10-CM

## 2019-11-12 DIAGNOSIS — G89.3 CANCER RELATED PAIN: ICD-10-CM

## 2019-11-12 DIAGNOSIS — E03.9 HYPOTHYROIDISM, UNSPECIFIED TYPE: ICD-10-CM

## 2019-11-12 LAB
ALBUMIN SERPL BCP-MCNC: 4 G/DL (ref 3.4–5)
ALBUMIN/GLOBULIN RATIO: 1 RATIO (ref 1.1–1.8)
ALP SERPL-CCNC: 63 U/L (ref 46–116)
ALT SERPL W P-5'-P-CCNC: 24 U/L (ref 12–78)
AST SERPL-CCNC: 25 U/L (ref 15–37)
BASOPHILS NFR SNV MANUAL: 0.3 % (ref 0–3)
BILIRUB SERPL-MCNC: 0.5 MG/DL (ref 0–1)
BUN SERPL-MCNC: 12 MG/DL (ref 7–18)
BUN/CREAT SERPL: 12.37 RATIO (ref 7–18)
CALCIUM SERPL-MCNC: 9.4 MG/DL (ref 8.8–10.5)
CHLORIDE SERPL-SCNC: 100 MMOL/L (ref 100–108)
CO2 SERPL-SCNC: 28 MMOL/L (ref 21–32)
CREAT SERPL-MCNC: 0.97 MG/DL (ref 0.7–1.3)
EOSINOPHIL NFR SNV MANUAL: 3.3 % (ref 1–3)
ERYTHROCYTE [DISTWIDTH] IN BLOOD BY AUTOMATED COUNT: 15.7 % (ref 12.5–18)
GFR ESTIMATION: > 60
GLOBULIN: 4 G/DL (ref 2.3–3.5)
GLUCOSE SERPL-MCNC: 112 MG/DL (ref 70–110)
HCT VFR BLD AUTO: 41 % (ref 42–52)
HGB BLD-MCNC: 13.7 G/DL (ref 14–18)
LYMPHOCYTES NFR SNV MANUAL: 23.2 % (ref 25–40)
MANUAL NRBC PER 100 CELLS: 0 %
MCH RBC QN AUTO: 32.2 PG (ref 27–31.2)
MCHC RBC AUTO-ENTMCNC: 33.4 G/DL (ref 31.8–35.4)
MCV RBC AUTO: 96.5 FL (ref 80–97)
MONOCYTES/100 LEUKOCYTES: 7.3 % (ref 1–15)
NEUTROPHILS NFR BLD: 4.69 10*3/UL (ref 1.8–7.7)
NEUTROPHILS NFR SNV MANUAL: 64.9 % (ref 37–80)
PLATELETS: 120 10*3/UL (ref 142–424)
POTASSIUM SERPL-SCNC: 4.3 MMOL/L (ref 3.6–5.2)
PROT SERPL-MCNC: 8 G/DL (ref 6.4–8.2)
RBC # BLD AUTO: 4.25 10*6/UL (ref 4.7–6.1)
SODIUM BLD-SCNC: 137 MMOL/L (ref 135–145)
TSH SERPL DL<=0.005 MIU/L-ACNC: 4.93 UIU/ML (ref 0.36–3.74)
WBC # BLD: 7.2 10*3/UL (ref 4.6–10.2)

## 2019-11-12 PROCEDURE — 99214 PR OFFICE/OUTPT VISIT, EST, LEVL IV, 30-39 MIN: ICD-10-PCS | Mod: S$GLB,,, | Performed by: NURSE PRACTITIONER

## 2019-11-12 PROCEDURE — 99214 OFFICE O/P EST MOD 30 MIN: CPT | Mod: S$GLB,,, | Performed by: NURSE PRACTITIONER

## 2019-11-12 RX ORDER — SODIUM CHLORIDE 0.9 % (FLUSH) 0.9 %
10 SYRINGE (ML) INJECTION
Status: CANCELLED | OUTPATIENT
Start: 2019-11-12

## 2019-11-12 RX ORDER — HEPARIN 100 UNIT/ML
500 SYRINGE INTRAVENOUS
Status: CANCELLED | OUTPATIENT
Start: 2019-11-12

## 2019-11-12 NOTE — PROGRESS NOTES
Subjective:      Patient ID: Jaime Motta is a 54 y.o. male.    Oncology History:     Malignant neoplasm of left lung stage 4    4/30/2017 Imaging Significant Findings     CT Thoracic spine shows posterior midline mass athethe T 23-L1 with soft tissue mass 3.5 cm       5/19/2017 Surgery     Neurosx at [facility] with Dr Harry Thoracic and lumbar laminetomy at T12-L1        5/22/2017 Imaging Significant Findings     MRI Thoracic Spine done for back pain showing large erosive mass in upper lumbar spine      5/24/2017 Pathology Significant Finding     Met adenoca CK7 + full path report not available from [facility]       8/10/2017 Pathology Significant Finding     Cytology LML mass adenoca well differeniated, TTF1+    EGRF/ MMR/MSi Not Detected      9/7/2017 - 3/1/2018 Chemotherapy       Carbo/Alimta cycle 1  Carbo/Alimta/Keytruda cycle 2 - 6      2/28/2019 Imaging Significant Findings     CT C/A/P stable disease as compared to 10/26/18           Chief Complaint: Lung Cancer (Left lung stage 4 )    Jaime Motta is here for OP PEMBROLIZUMAB 200MG Q3W      Treatment Goal:   Palliative      Status:   Active      Start Date:   3/23/2018      End Date:   12/3/2019 (Planned)      Provider:   Faith Swain NP      Chemotherapy:   pembrolizumab (KEYTRUDA) 200 mg in sodium chloride 0.9% 100 mL chemo infusion, 200 mg, Intravenous, Clinic/HOD 1 time, 25 of 27 cycles    Mr Motta has metastatic lung cancer and has been on Keytruda q 3 weeks since 3/23/18 and xgeva q 6 weeks.   Today the main concern is a recent ED visit on 8/30/19 for new onset lower abdominal pain and diarrhea after receiving Keytruda on 8/29/19. He had a Ct a/p which showed interval development of bowel wall thickening involving the cecum measuring up to 1.8 cm in thickness. Could not r/o ischemia vis inflammation/infection. Pt walked into clinic today to discuss results of CT, pt continues to have intermittent lowe abd pain but no diarrhea over the weekend. Will hold keytruda  and prescribe high dose steroid taper for the next 6 weeks as well as imaging after he completes steroids.    19 Visit;  Today in clinic, he has no c/o diarrhea or abdominal pain. He is currently week 5 of 6 week taper of steroids. He states he is feeling good. We will repeat Ct scan to ensure colitis resolved prior to resuming keytruda once he has completed his steroids.     10/18/19 Visit:  Pt has completed steroid taper and states feeling good. Diarrhea has resolved and recent CT scan shows DOMENIC for colitis.     19 visit:  He has no c/o after resuming keytruda due to holiday r/t episode of colitis in September. He c/o of left ear pain in which he went to ED yesterday, prescribed ear drops. Today complete cecum impaction noted to left ear. Advised to continue drops and f/u with PCP. Labs reviewed and pt to continue with treatment.     IMAGIN19 CT  C/A/P: on 19 show stable disease with no new lytic lesions. Of note, he does have lytic and asclerotic lesion of L1 And L3 as well as right iliac wing but they remain stable.  We will increase his percocet to 7.5/325mg TID for improved pain control.   CT Abdomen Pelvis  Without Contrast                                RADIOLOGY REPORT        PT NAME: AISHA GREENE CHONG      [facility] IMAGING     : 1965  54             [address]    ACCT: [id number]                                              [facility] Rec #: [id number]                                        [id number]    [facility]             Procedure: CT ABD   PELVIS WO/W     CONTRAST    REQUISITION #: [id number]      REPORT #: [id number]           DATE OF EXAM: 10/10/19    TIME OF EXAM: 0930       CMS MANDATED QUALITY DATA - CT RADIATION ? 436        All CT scans at this facility utilize dose modulation, iterative     reconstruction, and/or weight based dosing when appropriate to reduce     radiation dose to as low as reasonably  achievable.        HISTORY:Lung cancer. Colitis.        COMPARISON:August 30, 2019.        TECHNIQUE: A CT of the abdomen and pelvis was performed following the     administration of oral contrast and prior to and following administration of    100 cc of Isovue-300 intravenous contrast Estimated radiation dose is  20      mSv. All CT scans at this facility uses dose modulation and/or weight base     dosing when appropriate to reduce radiation dose to as low as reasonably     achievable.        FINDINGS: The liver, gallbladder, pancreas and spleen are unremarkable.     Adrenal glands are normal in appearance. No amount of the kidneys.        There is a small hiatal hernia. Bowel wall thickening of the cecum near the     ileocecal valve is decreased since prior study. No abscess or free air. No     abnormally enlarged or necrotic lymph nodes.        Sclerotic bone lesions of L1 and L3 consistent with metastatic disease are     unchanged. Arthritic changes of both sacroiliac joints and ill-defined     sclerosis of the right iliac bone are stable.        IMPRESSION:        Interval decrease in wall thickening of the cecum. Finding is of uncertain     etiology.        Stable metastatic disease to bone.        DICTATING PHYSICIAN:  LUIS LEÓN MD                   Date Dictated: 10/10/19 0939        Signed By:  LUIS LEÓN MD <Electronically signed by LUIS LEÓN MD in      OV>    Date Signed:  10/10/19 0945     CC: ANURAG REDDY NP ; ANURAG REDDY NP      ADMITTING PHYSICIAN:                                                                                                     ORDERING PHY: ANURAG REDDY NP                                                                                                                                                          ATTENDING PHY: ANURAG REDDY NP    Patient Status:  REG CLI    Admit Service Date: 10/10/19             Past Medical History:   Diagnosis Date    Bone cancer      Cancer     Lung cancer     Thyroid disease      Family History   Problem Relation Age of Onset    Bladder Cancer Mother     Cancer Father     Rectal cancer Brother     Lung cancer Brother     Pancreatic cancer Other     Esophageal cancer Other      Social History     Socioeconomic History    Marital status: Single     Spouse name: Not on file    Number of children: Not on file    Years of education: Not on file    Highest education level: Not on file   Occupational History    Not on file   Social Needs    Financial resource strain: Not on file    Food insecurity:     Worry: Not on file     Inability: Not on file    Transportation needs:     Medical: Not on file     Non-medical: Not on file   Tobacco Use    Smoking status: Current Every Day Smoker     Packs/day: 0.25     Years: 30.00     Pack years: 7.50    Smokeless tobacco: Never Used   Substance and Sexual Activity    Alcohol use: Yes     Alcohol/week: 3.0 standard drinks     Types: 3 Cans of beer per week     Comment: occasionally    Drug use: No    Sexual activity: Not on file   Lifestyle    Physical activity:     Days per week: Not on file     Minutes per session: Not on file    Stress: Not on file   Relationships    Social connections:     Talks on phone: Not on file     Gets together: Not on file     Attends Roman Catholic service: Not on file     Active member of club or organization: Not on file     Attends meetings of clubs or organizations: Not on file     Relationship status: Not on file   Other Topics Concern    Not on file   Social History Narrative    Not on file     Past Surgical History:   Procedure Laterality Date    BACK SURGERY      FINGER SURGERY      HERNIA REPAIR      LUNG BIOPSY      PORTACATH PLACEMENT             Review of systems:  Review of Systems   Constitutional: Positive for activity change. Negative for chills, fever and unexpected weight change.   HENT: Negative for dental problem, mouth sores, sore throat  and trouble swallowing.    Eyes: Negative for visual disturbance.   Respiratory: Negative for cough, chest tightness and shortness of breath.    Cardiovascular: Negative for chest pain, palpitations and leg swelling.   Gastrointestinal: Positive for abdominal distention and abdominal pain. Negative for blood in stool, constipation, diarrhea, nausea, rectal pain and vomiting.   Genitourinary: Negative for dysuria and hematuria.   Musculoskeletal: Positive for arthralgias, back pain, gait problem (left hip pain and weakness ) and neck pain (pt c/o pain to his right shoulder and neck area; reports radiates down right arm; reports he has been trying to cut back on painting to see if this alleviates pain). Negative for joint swelling and myalgias.   Skin: Negative for pallor and rash.   Neurological: Negative for dizziness, syncope, weakness, light-headedness, numbness and headaches.   Hematological: Negative for adenopathy. Does not bruise/bleed easily.   Psychiatric/Behavioral: Negative for agitation and suicidal ideas.       Objective:     Physical Exam   Constitutional: He is oriented to person, place, and time. He appears well-developed and well-nourished.   Neck: Normal range of motion.   Cardiovascular: Normal rate and regular rhythm.   Pulmonary/Chest: Effort normal and breath sounds normal.   Musculoskeletal: He exhibits tenderness.        Right shoulder: He exhibits decreased range of motion and tenderness.   Neurological: He is alert and oriented to person, place, and time.   Psychiatric: He has a normal mood and affect.     Vitals:    11/12/19 0852   BP: 122/84   Pulse: 86   Resp: 14   Temp: 97.6 °F (36.4 °C)   Body surface area is 1.81 meters squared.    Labs:  11/12/19 Labs reviewed TSH 4.8     Assessment:      1. Malignant neoplasm of left lung stage 4    2. Metastatic cancer to bone    3. Cancer related pain    4. Hypothyroidism, unspecified type           Plan:   There are no diagnoses linked to this  encounter.  1. Will continue Keytruda as planned. Xgeva due today.  3. Currently no c/o diarrhea but advised to call if it occurs.  4. CT abd/pevis reviewed with pt.   5. RTC in 3 weeks with labs.   6. Will increase synthroid to 175 mcg daily.    Faith Swain, ANP

## 2019-12-03 ENCOUNTER — OFFICE VISIT (OUTPATIENT)
Dept: HEMATOLOGY/ONCOLOGY | Facility: CLINIC | Age: 54
End: 2019-12-03
Payer: MEDICAID

## 2019-12-03 VITALS
RESPIRATION RATE: 18 BRPM | HEART RATE: 95 BPM | OXYGEN SATURATION: 95 % | TEMPERATURE: 98 F | WEIGHT: 160.19 LBS | BODY MASS INDEX: 27.35 KG/M2 | HEIGHT: 64 IN | DIASTOLIC BLOOD PRESSURE: 90 MMHG | SYSTOLIC BLOOD PRESSURE: 136 MMHG

## 2019-12-03 DIAGNOSIS — C34.92 MALIGNANT NEOPLASM OF LEFT LUNG STAGE 4: Primary | ICD-10-CM

## 2019-12-03 DIAGNOSIS — R19.7 DIARRHEA, UNSPECIFIED TYPE: ICD-10-CM

## 2019-12-03 DIAGNOSIS — C79.51 METASTATIC CANCER TO BONE: ICD-10-CM

## 2019-12-03 DIAGNOSIS — E03.9 HYPOTHYROIDISM, UNSPECIFIED TYPE: ICD-10-CM

## 2019-12-03 DIAGNOSIS — M25.50 ARTHRALGIA, UNSPECIFIED JOINT: ICD-10-CM

## 2019-12-03 LAB
ALBUMIN SERPL BCP-MCNC: 3.8 G/DL (ref 3.4–5)
ALBUMIN/GLOBULIN RATIO: 0.95 RATIO (ref 1.1–1.8)
ALP SERPL-CCNC: 60 U/L (ref 46–116)
ALT SERPL W P-5'-P-CCNC: 27 U/L (ref 12–78)
AST SERPL-CCNC: 24 U/L (ref 15–37)
BASOPHILS NFR SNV MANUAL: 0.4 % (ref 0–3)
BILIRUB SERPL-MCNC: 0.5 MG/DL (ref 0–1)
BUN SERPL-MCNC: 12 MG/DL (ref 7–18)
BUN/CREAT SERPL: 10.25 RATIO (ref 7–18)
CALCIUM SERPL-MCNC: 8.9 MG/DL (ref 8.8–10.5)
CHLORIDE SERPL-SCNC: 99 MMOL/L (ref 100–108)
CO2 SERPL-SCNC: 28 MMOL/L (ref 21–32)
CREAT SERPL-MCNC: 1.17 MG/DL (ref 0.7–1.3)
EOSINOPHIL NFR SNV MANUAL: 3.2 % (ref 1–3)
ERYTHROCYTE [DISTWIDTH] IN BLOOD BY AUTOMATED COUNT: 14.6 % (ref 12.5–18)
GFR ESTIMATION: > 60
GLOBULIN: 4 G/DL (ref 2.3–3.5)
GLUCOSE SERPL-MCNC: 148 MG/DL (ref 70–110)
HCT VFR BLD AUTO: 39.7 % (ref 42–52)
HGB BLD-MCNC: 13.5 G/DL (ref 14–18)
LYMPHOCYTES NFR SNV MANUAL: 23.4 % (ref 25–40)
MANUAL NRBC PER 100 CELLS: 0 %
MCH RBC QN AUTO: 32.9 PG (ref 27–31.2)
MCHC RBC AUTO-ENTMCNC: 34 G/DL (ref 31.8–35.4)
MCV RBC AUTO: 96.8 FL (ref 80–97)
MONOCYTES/100 LEUKOCYTES: 5.3 % (ref 1–15)
NEUTROPHILS NFR BLD: 4.8 10*3/UL (ref 1.8–7.7)
NEUTROPHILS NFR SNV MANUAL: 67.3 % (ref 37–80)
PLATELETS: 145 10*3/UL (ref 142–424)
POTASSIUM SERPL-SCNC: 3.7 MMOL/L (ref 3.6–5.2)
PROT SERPL-MCNC: 7.8 G/DL (ref 6.4–8.2)
RBC # BLD AUTO: 4.1 10*6/UL (ref 4.7–6.1)
SODIUM BLD-SCNC: 136 MMOL/L (ref 135–145)
TSH SERPL DL<=0.005 MIU/L-ACNC: 2.56 UIU/ML (ref 0.36–3.74)
WBC # BLD: 7.1 10*3/UL (ref 4.6–10.2)

## 2019-12-03 PROCEDURE — 99214 OFFICE O/P EST MOD 30 MIN: CPT | Mod: S$GLB,,, | Performed by: NURSE PRACTITIONER

## 2019-12-03 PROCEDURE — 99214 PR OFFICE/OUTPT VISIT, EST, LEVL IV, 30-39 MIN: ICD-10-PCS | Mod: S$GLB,,, | Performed by: NURSE PRACTITIONER

## 2019-12-03 RX ORDER — OXYCODONE AND ACETAMINOPHEN 7.5; 325 MG/1; MG/1
1 TABLET ORAL EVERY 8 HOURS PRN
Qty: 90 TABLET | Refills: 0 | Status: SHIPPED | OUTPATIENT
Start: 2019-12-03 | End: 2020-01-07 | Stop reason: SDUPTHER

## 2019-12-03 RX ORDER — HEPARIN 100 UNIT/ML
500 SYRINGE INTRAVENOUS
Status: CANCELLED | OUTPATIENT
Start: 2019-12-03

## 2019-12-03 RX ORDER — SODIUM CHLORIDE 0.9 % (FLUSH) 0.9 %
10 SYRINGE (ML) INJECTION
Status: CANCELLED | OUTPATIENT
Start: 2019-12-03

## 2019-12-03 NOTE — PROGRESS NOTES
Subjective:      Patient ID: Jaime Motta is a 54 y.o. male.    Oncology History:     Malignant neoplasm of left lung stage 4    4/30/2017 Imaging Significant Findings     CT Thoracic spine shows posterior midline mass athethe T 23-L1 with soft tissue mass 3.5 cm       5/19/2017 Surgery     Neurosx at [facility] with Dr Harry Thoracic and lumbar laminetomy at T12-L1        5/22/2017 Imaging Significant Findings     MRI Thoracic Spine done for back pain showing large erosive mass in upper lumbar spine      5/24/2017 Pathology Significant Finding     Met adenoca CK7 + full path report not available from [facility]       8/10/2017 Pathology Significant Finding     Cytology LML mass adenoca well differeniated, TTF1+    EGRF/ MMR/MSi Not Detected      9/7/2017 - 3/1/2018 Chemotherapy       Carbo/Alimta cycle 1  Carbo/Alimta/Keytruda cycle 2 - 6      2/28/2019 Imaging Significant Findings     CT C/A/P stable disease as compared to 10/26/18           Chief Complaint: Lung Cancer    Jaime Motta is here for OP PEMBROLIZUMAB 200MG Q3W      Treatment Goal:   Palliative      Status:   Active      Start Date:   3/23/2018      End Date:   12/3/2019 (Planned)      Provider:   Faith Swain NP      Chemotherapy:   pembrolizumab (KEYTRUDA) 200 mg in sodium chloride 0.9% 100 mL chemo infusion, 200 mg, Intravenous, Clinic/HOD 1 time, 26 of 27 cycles    Mr Motta has metastatic lung cancer and has been on Keytruda q 3 weeks since 3/23/18 and xgeva q 6 weeks.   Today the main concern is a recent ED visit on 8/30/19 for new onset lower abdominal pain and diarrhea after receiving Keytruda on 8/29/19. He had a Ct a/p which showed interval development of bowel wall thickening involving the cecum measuring up to 1.8 cm in thickness. Could not r/o ischemia vis inflammation/infection. Pt walked into clinic today to discuss results of CT, pt continues to have intermittent lowe abd pain but no diarrhea over the weekend. Will hold keytruda and prescribe high  dose steroid taper for the next 6 weeks as well as imaging after he completes steroids.    19 Visit;  Today in clinic, he has no c/o diarrhea or abdominal pain. He is currently week 5 of 6 week taper of steroids. He states he is feeling good. We will repeat Ct scan to ensure colitis resolved prior to resuming keytruda once he has completed his steroids.     10/18/19 Visit:  Pt has completed steroid taper and states feeling good. Diarrhea has resolved and recent CT scan shows DOMENIC for colitis.     19 visit:  He has no c/o after resuming keytruda due to holiday r/t episode of colitis in September. He c/o of left ear pain in which he went to ED yesterday, prescribed ear drops. Today complete cecum impaction noted to left ear. Advised to continue drops and f/u with PCP. Labs reviewed and pt to continue with treatment.     12/3/19 visit:  No c/o of diarrhea with most recent treatment but c/o knee and shoulder aches. Labs reviewed and pt is cleared for tx today. Will plan to rescan in 6-8 weeks, if scans are stable we discussed possible tx holiday. He will complete 2 years of IO in 3/2020.     IMAGIN19 CT  C/A/P: on 19 show stable disease with no new lytic lesions. Of note, he does have lytic and asclerotic lesion of L1 And L3 as well as right iliac wing but they remain stable.  We will increase his percocet to 7.5/325mg TID for improved pain control.   CT Abdomen Pelvis  Without Contrast                                RADIOLOGY REPORT        PT NAME: AISHA GREENE CHONG      [facility]     : 1965 M 54             [address]    ACCT: [id number]                                              [facility] Rec #: [id number]                                        [id number]    [facility]             Procedure: CT ABD   PELVIS WO/W     CONTRAST    REQUISITION #: [id number]      REPORT #: [id number]           DATE OF EXAM: 10/10/19    TIME OF EXAM:  0930       CMS MANDATED QUALITY DATA - CT RADIATION ? 436        All CT scans at this facility utilize dose modulation, iterative     reconstruction, and/or weight based dosing when appropriate to reduce     radiation dose to as low as reasonably achievable.        HISTORY:Lung cancer. Colitis.        COMPARISON:August 30, 2019.        TECHNIQUE: A CT of the abdomen and pelvis was performed following the     administration of oral contrast and prior to and following administration of    100 cc of Isovue-300 intravenous contrast Estimated radiation dose is  20      mSv. All CT scans at this facility uses dose modulation and/or weight base     dosing when appropriate to reduce radiation dose to as low as reasonably     achievable.        FINDINGS: The liver, gallbladder, pancreas and spleen are unremarkable.     Adrenal glands are normal in appearance. No amount of the kidneys.        There is a small hiatal hernia. Bowel wall thickening of the cecum near the     ileocecal valve is decreased since prior study. No abscess or free air. No     abnormally enlarged or necrotic lymph nodes.        Sclerotic bone lesions of L1 and L3 consistent with metastatic disease are     unchanged. Arthritic changes of both sacroiliac joints and ill-defined     sclerosis of the right iliac bone are stable.        IMPRESSION:        Interval decrease in wall thickening of the cecum. Finding is of uncertain     etiology.        Stable metastatic disease to bone.        DICTATING PHYSICIAN:  LUIS LEÓN MD                   Date Dictated: 10/10/19 0939        Signed By:  LUIS LEÓN MD <Electronically signed by LUIS LEÓN MD in      OV>    Date Signed:  10/10/19 0945     CC: ANURAG REDDY NP ; ANURAG REDDY NP      ADMITTING PHYSICIAN:                                                                                                     ORDERING PHY: ANURAG REDDY NP                                                                                                                                                           ATTENDING PHY: ANURAG REDDY NP    Patient Status:  REG CLI    Admit Service Date: 10/10/19             Past Medical History:   Diagnosis Date    Bone cancer     Cancer     Lung cancer     Thyroid disease      Family History   Problem Relation Age of Onset    Bladder Cancer Mother     Cancer Father     Rectal cancer Brother     Lung cancer Brother     Pancreatic cancer Other     Esophageal cancer Other      Social History     Socioeconomic History    Marital status: Single     Spouse name: Not on file    Number of children: Not on file    Years of education: Not on file    Highest education level: Not on file   Occupational History    Not on file   Social Needs    Financial resource strain: Not on file    Food insecurity:     Worry: Not on file     Inability: Not on file    Transportation needs:     Medical: Not on file     Non-medical: Not on file   Tobacco Use    Smoking status: Current Every Day Smoker     Packs/day: 0.25     Years: 30.00     Pack years: 7.50    Smokeless tobacco: Never Used   Substance and Sexual Activity    Alcohol use: Yes     Alcohol/week: 3.0 standard drinks     Types: 3 Cans of beer per week     Comment: occasionally    Drug use: No    Sexual activity: Not on file   Lifestyle    Physical activity:     Days per week: Not on file     Minutes per session: Not on file    Stress: Not on file   Relationships    Social connections:     Talks on phone: Not on file     Gets together: Not on file     Attends Oriental orthodox service: Not on file     Active member of club or organization: Not on file     Attends meetings of clubs or organizations: Not on file     Relationship status: Not on file   Other Topics Concern    Not on file   Social History Narrative    Not on file     Past Surgical History:   Procedure Laterality Date    BACK SURGERY      FINGER SURGERY      HERNIA REPAIR      LUNG  BIOPSY      PORTACATH PLACEMENT             Review of systems:  Review of Systems   Constitutional: Positive for activity change. Negative for chills, fever and unexpected weight change.   HENT: Negative for dental problem, mouth sores, sore throat and trouble swallowing.    Eyes: Negative for visual disturbance.   Respiratory: Negative for cough, chest tightness and shortness of breath.    Cardiovascular: Negative for chest pain, palpitations and leg swelling.   Gastrointestinal: Positive for abdominal distention and abdominal pain. Negative for blood in stool, constipation, diarrhea, nausea, rectal pain and vomiting.   Genitourinary: Negative for dysuria and hematuria.   Musculoskeletal: Positive for arthralgias, back pain, gait problem (left hip pain and weakness ) and neck pain (pt c/o pain to his right shoulder and neck area; reports radiates down right arm; reports he has been trying to cut back on painting to see if this alleviates pain). Negative for joint swelling and myalgias.   Skin: Negative for pallor and rash.   Neurological: Negative for dizziness, syncope, weakness, light-headedness, numbness and headaches.   Hematological: Negative for adenopathy. Does not bruise/bleed easily.   Psychiatric/Behavioral: Negative for agitation and suicidal ideas.       Objective:     Physical Exam   Constitutional: He is oriented to person, place, and time. He appears well-developed and well-nourished.   Neck: Normal range of motion.   Cardiovascular: Normal rate and regular rhythm.   Pulmonary/Chest: Effort normal and breath sounds normal.   Musculoskeletal: He exhibits tenderness.        Right shoulder: He exhibits decreased range of motion and tenderness.   Neurological: He is alert and oriented to person, place, and time.   Psychiatric: He has a normal mood and affect.     Vitals:    12/03/19 0811   BP: (!) 136/90   Pulse: 95   Resp: 18   Temp: 98.3 °F (36.8 °C)   Body surface area is 1.81 meters  squared.    Labs:  12/2/19 Labs reviewed     Assessment:      1. Malignant neoplasm of left lung stage 4    2. Hypothyroidism, unspecified type    3. Diarrhea, unspecified type    4. Arthralgia, unspecified joint           Plan:   There are no diagnoses linked to this encounter.  1. Will continue Keytruda as planned. Xgeva due today.  3. Currently no c/o diarrhea but advised to call if it occurs.  4. Pt will complete 2 years of IO in 3/2020 if scans still appear stable to no disease, discussed possible tx holiday.   5. RTC in 3 weeks with labs.   6. Will increase synthroid to 175 mcg daily.    Faith Swain, ANP

## 2019-12-24 ENCOUNTER — OFFICE VISIT (OUTPATIENT)
Dept: HEMATOLOGY/ONCOLOGY | Facility: CLINIC | Age: 54
End: 2019-12-24
Payer: MEDICAID

## 2019-12-24 VITALS
RESPIRATION RATE: 12 BRPM | HEIGHT: 64 IN | TEMPERATURE: 97 F | DIASTOLIC BLOOD PRESSURE: 89 MMHG | WEIGHT: 163 LBS | SYSTOLIC BLOOD PRESSURE: 127 MMHG | HEART RATE: 89 BPM | BODY MASS INDEX: 27.83 KG/M2

## 2019-12-24 DIAGNOSIS — I10 HYPERTENSION, UNSPECIFIED TYPE: ICD-10-CM

## 2019-12-24 DIAGNOSIS — C79.51 METASTATIC CANCER TO BONE: ICD-10-CM

## 2019-12-24 DIAGNOSIS — G89.3 CANCER RELATED PAIN: ICD-10-CM

## 2019-12-24 DIAGNOSIS — C34.92 MALIGNANT NEOPLASM OF LEFT LUNG STAGE 4: Primary | ICD-10-CM

## 2019-12-24 LAB
ALBUMIN SERPL BCP-MCNC: 3.8 G/DL (ref 3.4–5)
ALBUMIN/GLOBULIN RATIO: 1 RATIO (ref 1.1–1.8)
ALP SERPL-CCNC: 64 U/L (ref 46–116)
ALT SERPL W P-5'-P-CCNC: 26 U/L (ref 12–78)
AST SERPL-CCNC: 23 U/L (ref 15–37)
BASOPHILS NFR SNV MANUAL: 0.4 % (ref 0–3)
BILIRUB SERPL-MCNC: 0.4 MG/DL (ref 0–1)
BUN SERPL-MCNC: 11 MG/DL (ref 7–18)
BUN/CREAT SERPL: 10.78 RATIO (ref 7–18)
CALCIUM SERPL-MCNC: 9.5 MG/DL (ref 8.8–10.5)
CHLORIDE SERPL-SCNC: 101 MMOL/L (ref 100–108)
CO2 SERPL-SCNC: 32 MMOL/L (ref 21–32)
CREAT SERPL-MCNC: 1.02 MG/DL (ref 0.7–1.3)
EOSINOPHIL NFR SNV MANUAL: 3.8 % (ref 1–3)
ERYTHROCYTE [DISTWIDTH] IN BLOOD BY AUTOMATED COUNT: 14.2 % (ref 12.5–18)
GFR ESTIMATION: > 60
GLOBULIN: 3.8 G/DL (ref 2.3–3.5)
GLUCOSE SERPL-MCNC: 113 MG/DL (ref 70–110)
HCT VFR BLD AUTO: 41.3 % (ref 42–52)
HGB BLD-MCNC: 13.6 G/DL (ref 14–18)
LYMPHOCYTES NFR SNV MANUAL: 27.1 % (ref 25–40)
MACROCYTES BLD QL SMEAR: NORMAL
MANUAL NRBC PER 100 CELLS: 0 %
MCH RBC QN AUTO: 33 PG (ref 27–31.2)
MCHC RBC AUTO-ENTMCNC: 32.9 G/DL (ref 31.8–35.4)
MCV RBC AUTO: 100.2 FL (ref 80–97)
MONOCYTES/100 LEUKOCYTES: 8.3 % (ref 1–15)
NEUTROPHILS NFR BLD: 4.36 10*3/UL (ref 1.8–7.7)
NEUTROPHILS NFR SNV MANUAL: 59.6 % (ref 37–80)
PLATELETS: 155 10*3/UL (ref 142–424)
POTASSIUM SERPL-SCNC: 3.9 MMOL/L (ref 3.6–5.2)
PROT SERPL-MCNC: 7.6 G/DL (ref 6.4–8.2)
RBC # BLD AUTO: 4.12 10*6/UL (ref 4.7–6.1)
SODIUM BLD-SCNC: 139 MMOL/L (ref 135–145)
TSH SERPL DL<=0.005 MIU/L-ACNC: 5.97 UIU/ML (ref 0.36–3.74)
WBC # BLD: 7.3 10*3/UL (ref 4.6–10.2)

## 2019-12-24 PROCEDURE — 99214 PR OFFICE/OUTPT VISIT, EST, LEVL IV, 30-39 MIN: ICD-10-PCS | Mod: S$GLB,,, | Performed by: NURSE PRACTITIONER

## 2019-12-24 PROCEDURE — 99214 OFFICE O/P EST MOD 30 MIN: CPT | Mod: S$GLB,,, | Performed by: NURSE PRACTITIONER

## 2019-12-24 RX ORDER — HEPARIN 100 UNIT/ML
500 SYRINGE INTRAVENOUS
Status: CANCELLED | OUTPATIENT
Start: 2019-12-26

## 2019-12-24 RX ORDER — SODIUM CHLORIDE 0.9 % (FLUSH) 0.9 %
10 SYRINGE (ML) INJECTION
Status: CANCELLED | OUTPATIENT
Start: 2019-12-26

## 2019-12-24 NOTE — PROGRESS NOTES
Subjective:      Patient ID: Jaime Motta is a 54 y.o. male.    Oncology History:     Malignant neoplasm of left lung stage 4    4/30/2017 Imaging Significant Findings     CT Thoracic spine shows posterior midline mass athethe T 23-L1 with soft tissue mass 3.5 cm       5/19/2017 Surgery     [facility] Thor[facility] and lumbar laminetomy at T12-L1        5/22/2017 Imaging Significant Findings     MRI Thoracic Spine done for back pain showing large erosive mass in upper lumbar spine      5/24/2017 Pathology Significant Finding     Met adenoca CK7 + full path report not available from [facility]       8/10/2017 Pathology Significant Finding     Cytology LML mass adenoca well differeniated, TTF1+    EGRF/ MMR/MSi Not Detected      9/7/2017 - 3/1/2018 Chemotherapy       Carbo/Alimta cycle 1  Carbo/Alimta/Keytruda cycle 2 - 6      2/28/2019 Imaging Significant Findings     CT C/A/P stable disease as compared to 10/26/18           Chief Complaint: Lung Cancer (left lung, stage 4 )    Jaime Motta is here for OP PEMBROLIZUMAB 200MG Q3W      Treatment Goal:   Palliative      Status:   Active      Start Date:   3/23/2018      End Date:   12/26/2019 (Planned)      Provider:   Faith Swain NP      Chemotherapy:   pembrolizumab (KEYTRUDA) 200 mg in sodium chloride 0.9% 100 mL chemo infusion, 200 mg, Intravenous, Clinic/HOD 1 time, 27 of 28 cycles    Mr Motta has metastatic lung cancer and has been on Keytruda q 3 weeks since 3/23/18 and xgeva q 6 weeks.   Today the main concern is a recent ED visit on 8/30/19 for new onset lower abdominal pain and diarrhea after receiving Keytruda on 8/29/19. He had a Ct a/p which showed interval development of bowel wall thickening involving the cecum measuring up to 1.8 cm in thickness. Could not r/o ischemia vis inflammation/infection. Pt walked into clinic today to discuss results of CT, pt continues to have intermittent lowe abd pain but no diarrhea over the weekend. Will hold  keytruda and prescribe high dose steroid taper for the next 6 weeks as well as imaging after he completes steroids.    19 Visit;  Today in clinic, he has no c/o diarrhea or abdominal pain. He is currently week 5 of 6 week taper of steroids. He states he is feeling good. We will repeat Ct scan to ensure colitis resolved prior to resuming keytruda once he has completed his steroids.     10/18/19 Visit:  Pt has completed steroid taper and states feeling good. Diarrhea has resolved and recent CT scan shows DOMENIC for colitis.     19 visit:  He has no c/o after resuming keytruda due to holiday r/t episode of colitis in September. He c/o of left ear pain in which he went to ED yesterday, prescribed ear drops. Today complete cecum impaction noted to left ear. Advised to continue drops and f/u with PCP. Labs reviewed and pt to continue with treatment.     12/3/19 visit:  No c/o of diarrhea with most recent treatment but c/o knee and shoulder aches. Labs reviewed and pt is cleared for tx today. Will plan to rescan in 6-8 weeks, if scans are stable we discussed possible tx holiday. He will complete 2 years of IO in 3/2020.     19 visit:  C/o of new onset facial swelling which resolved after pt discussed with pharmacy and was told to hold Lisinopril. Discussed ACE allergy and advised to permanently dc med. BP WNL today, will continue to monitor. Pt states some mild diarrhea but well controlled with imodium. Labs reviewed and pt cleared for IO for Thursday.     IMAGIN19 CT  C/A/P: on 19 show stable disease with no new lytic lesions. Of note, he does have lytic and asclerotic lesion of L1 And L3 as well as right iliac wing but they remain stable.  We will increase his percocet to 7.5/325mg TID for improved pain control.   CT Abdomen Pelvis  Without Contrast                                RADIOLOGY REPORT        PT NAME: AISHA GREENE      [facility] IMAGING     : 1965 [address]              [address] [facility] [address]    ACCT: [id number]                                              [place] [facility] Rec #: [id number]                                        [id number]    [facility]             Procedure: CT ABD   PELVIS WO/W     CONTRAST    REQUISITION #: [id number]      REPORT #: [id number]           DATE OF EXAM: 10/10/19    TIME OF EXAM: 0930       CMS MANDATED QUALITY DATA - CT RADIATION ? 436        All CT scans at this facility utilize dose modulation, iterative     reconstruction, and/or weight based dosing when appropriate to reduce     radiation dose to as low as reasonably achievable.        HISTORY:Lung cancer. Colitis.        COMPARISON:August 30, 2019.        TECHNIQUE: A CT of the abdomen and pelvis was performed following the     administration of oral contrast and prior to and following administration of    100 cc of Isovue-300 intravenous contrast Estimated radiation dose is  20      mSv. All CT scans at this facility uses dose modulation and/or weight base     dosing when appropriate to reduce radiation dose to as low as reasonably     achievable.        FINDINGS: The liver, gallbladder, pancreas and spleen are unremarkable.     Adrenal glands are normal in appearance. No amount of the kidneys.        There is a small hiatal hernia. Bowel wall thickening of the cecum near the     ileocecal valve is decreased since prior study. No abscess or free air. No     abnormally enlarged or necrotic lymph nodes.        Sclerotic bone lesions of L1 and L3 consistent with metastatic disease are     unchanged. Arthritic changes of both sacroiliac joints and ill-defined     sclerosis of the right iliac bone are stable.        IMPRESSION:        Interval decrease in wall thickening of the cecum. Finding is of uncertain     etiology.        Stable metastatic disease to bone.        DICTATING PHYSICIAN:  LUIS LEÓN MD                   Date Dictated: 10/10/19 0939        Signed By:   LUIS LEÓN MD <Electronically signed by LUIS LEÓN MD in      OV>    Date Signed:  10/10/19 0945     CC: ANURAG REDDY NP ; ANURAG REDDY NP      ADMITTING PHYSICIAN:                                                                                                     ORDERING PHY: ANURAG REDDY NP                                                                                                                                                          ATTENDING PHY: ANURAG REDDY NP    Patient Status:  REG CLI    Admit Service Date: 10/10/19             Past Medical History:   Diagnosis Date    Bone cancer     Cancer     Lung cancer     Thyroid disease      Family History   Problem Relation Age of Onset    Bladder Cancer Mother     Cancer Father     Rectal cancer Brother     Lung cancer Brother     Pancreatic cancer Other     Esophageal cancer Other      Social History     Socioeconomic History    Marital status: Single     Spouse name: Not on file    Number of children: Not on file    Years of education: Not on file    Highest education level: Not on file   Occupational History    Not on file   Social Needs    Financial resource strain: Not on file    Food insecurity:     Worry: Not on file     Inability: Not on file    Transportation needs:     Medical: Not on file     Non-medical: Not on file   Tobacco Use    Smoking status: Current Every Day Smoker     Packs/day: 0.25     Years: 30.00     Pack years: 7.50    Smokeless tobacco: Never Used   Substance and Sexual Activity    Alcohol use: Yes     Alcohol/week: 3.0 standard drinks     Types: 3 Cans of beer per week     Comment: occasionally    Drug use: No    Sexual activity: Not on file   Lifestyle    Physical activity:     Days per week: Not on file     Minutes per session: Not on file    Stress: Not on file   Relationships    Social connections:     Talks on phone: Not on file     Gets together: Not on file     Attends Scientology service:  Not on file     Active member of club or organization: Not on file     Attends meetings of clubs or organizations: Not on file     Relationship status: Not on file   Other Topics Concern    Not on file   Social History Narrative    Not on file     Past Surgical History:   Procedure Laterality Date    BACK SURGERY      FINGER SURGERY      HERNIA REPAIR      LUNG BIOPSY      PORTACATH PLACEMENT             Review of systems:  Review of Systems   Constitutional: Positive for activity change. Negative for chills, fever and unexpected weight change.   HENT: Negative for dental problem, mouth sores, sore throat and trouble swallowing.    Eyes: Negative for visual disturbance.   Respiratory: Negative for cough, chest tightness and shortness of breath.    Cardiovascular: Negative for chest pain, palpitations and leg swelling.   Gastrointestinal: Positive for abdominal distention and abdominal pain. Negative for blood in stool, constipation, diarrhea, nausea, rectal pain and vomiting.   Genitourinary: Negative for dysuria and hematuria.   Musculoskeletal: Positive for arthralgias, back pain, gait problem (left hip pain and weakness ) and neck pain (pt c/o pain to his right shoulder and neck area; reports radiates down right arm; reports he has been trying to cut back on painting to see if this alleviates pain). Negative for joint swelling and myalgias.   Skin: Negative for pallor and rash.   Neurological: Negative for dizziness, syncope, weakness, light-headedness, numbness and headaches.   Hematological: Negative for adenopathy. Does not bruise/bleed easily.   Psychiatric/Behavioral: Negative for agitation and suicidal ideas.       Objective:     Physical Exam   Constitutional: He is oriented to person, place, and time. He appears well-developed and well-nourished.   Neck: Normal range of motion.   Cardiovascular: Normal rate and regular rhythm.   Pulmonary/Chest: Effort normal and breath sounds normal.    Musculoskeletal: He exhibits tenderness.        Right shoulder: He exhibits decreased range of motion and tenderness.   Neurological: He is alert and oriented to person, place, and time.   Psychiatric: He has a normal mood and affect.     Vitals:    12/24/19 0839   BP: 127/89   Pulse: 89   Resp: 12   Temp: 96.8 °F (36 °C)   Body surface area is 1.83 meters squared.    Labs:  12/2/19 Labs reviewed     Assessment:      1. Malignant neoplasm of left lung stage 4    2. Metastatic cancer to bone    3. Cancer related pain    4. Hypertension, unspecified type           Plan:   There are no diagnoses linked to this encounter.  1. Will continue Keytruda as planned. Xgeva due at next visit.   3. Currently no c/o diarrhea but advised to call if it occurs.  4. Pt will complete 2 years of IO in 3/2020 if scans still appear stable to no disease, discussed possible tx holiday.   5. RTC in 3 weeks with labs.   6. Will increase synthroid to 175 mcg daily.  7. Pt was advised by pharmacy to hold lisinopril due to c/o of facial swelling, will permanently dc medication and monitor BP. Currently BP wnl, no indication to treat.   Faith Swain, ANP

## 2020-01-03 NOTE — ADDENDUM NOTE
Addended by: TOMASZ OSBORNE on: 1/3/2020 10:55 AM     Modules accepted: Orders    
Note Text (......Xxx Chief Complaint.): This diagnosis correlates with the
Other (Free Text): Biopsy proven [id number]
Detail Level: Simple

## 2020-01-07 DIAGNOSIS — C34.92 MALIGNANT NEOPLASM OF LEFT LUNG STAGE 4: ICD-10-CM

## 2020-01-07 DIAGNOSIS — C79.51 METASTATIC CANCER TO BONE: ICD-10-CM

## 2020-01-07 RX ORDER — OXYCODONE AND ACETAMINOPHEN 7.5; 325 MG/1; MG/1
1 TABLET ORAL EVERY 8 HOURS PRN
Qty: 90 TABLET | Refills: 0 | Status: SHIPPED | OUTPATIENT
Start: 2020-01-07 | End: 2020-01-16 | Stop reason: SDUPTHER

## 2020-01-16 ENCOUNTER — OFFICE VISIT (OUTPATIENT)
Dept: HEMATOLOGY/ONCOLOGY | Facility: CLINIC | Age: 55
End: 2020-01-16
Payer: MEDICAID

## 2020-01-16 VITALS
SYSTOLIC BLOOD PRESSURE: 148 MMHG | WEIGHT: 158 LBS | BODY MASS INDEX: 26.98 KG/M2 | HEIGHT: 64 IN | RESPIRATION RATE: 10 BRPM | HEART RATE: 81 BPM | DIASTOLIC BLOOD PRESSURE: 91 MMHG | TEMPERATURE: 99 F | OXYGEN SATURATION: 96 %

## 2020-01-16 DIAGNOSIS — E03.9 HYPOTHYROIDISM, UNSPECIFIED TYPE: Primary | ICD-10-CM

## 2020-01-16 DIAGNOSIS — C34.92 MALIGNANT NEOPLASM OF LEFT LUNG STAGE 4: ICD-10-CM

## 2020-01-16 DIAGNOSIS — C79.51 METASTATIC CANCER TO BONE: ICD-10-CM

## 2020-01-16 LAB
ALBUMIN SERPL BCP-MCNC: 3.8 G/DL (ref 3.4–5)
ALBUMIN/GLOBULIN RATIO: 0.86 RATIO (ref 1.1–1.8)
ALP SERPL-CCNC: 68 U/L (ref 46–116)
ALT SERPL W P-5'-P-CCNC: 48 U/L (ref 12–78)
AST SERPL-CCNC: 61 U/L (ref 15–37)
BASOPHILS NFR SNV MANUAL: 0.5 % (ref 0–3)
BILIRUB SERPL-MCNC: 0.6 MG/DL (ref 0–1)
BUN SERPL-MCNC: 11 MG/DL (ref 7–18)
BUN/CREAT SERPL: 11.22 RATIO (ref 7–18)
CALCIUM SERPL-MCNC: 9.1 MG/DL (ref 8.8–10.5)
CHLORIDE SERPL-SCNC: 101 MMOL/L (ref 100–108)
CO2 SERPL-SCNC: 26 MMOL/L (ref 21–32)
CREAT SERPL-MCNC: 0.98 MG/DL (ref 0.7–1.3)
EOSINOPHIL NFR SNV MANUAL: 2.1 % (ref 1–3)
ERYTHROCYTE [DISTWIDTH] IN BLOOD BY AUTOMATED COUNT: 14.9 % (ref 12.5–18)
GFR ESTIMATION: > 60
GLOBULIN: 4.4 G/DL (ref 2.3–3.5)
GLUCOSE SERPL-MCNC: 103 MG/DL (ref 70–110)
HCT VFR BLD AUTO: 41 % (ref 42–52)
HGB BLD-MCNC: 13.7 G/DL (ref 14–18)
LYMPHOCYTES NFR SNV MANUAL: 25.2 % (ref 25–40)
MACROCYTES BLD QL SMEAR: NORMAL
MANUAL NRBC PER 100 CELLS: 0 %
MCH RBC QN AUTO: 33.6 PG (ref 27–31.2)
MCHC RBC AUTO-ENTMCNC: 33.4 G/DL (ref 31.8–35.4)
MCV RBC AUTO: 100.5 FL (ref 80–97)
MONOCYTES/100 LEUKOCYTES: 9.3 % (ref 1–15)
NEUTROPHILS NFR BLD: 4.13 10*3/UL (ref 1.8–7.7)
NEUTROPHILS NFR SNV MANUAL: 62.1 % (ref 37–80)
PLATELETS: 117 10*3/UL (ref 142–424)
POTASSIUM SERPL-SCNC: 3.5 MMOL/L (ref 3.6–5.2)
PROT SERPL-MCNC: 8.2 G/DL (ref 6.4–8.2)
RBC # BLD AUTO: 4.08 10*6/UL (ref 4.7–6.1)
SODIUM BLD-SCNC: 135 MMOL/L (ref 135–145)
TSH SERPL DL<=0.005 MIU/L-ACNC: 17.03 UIU/ML (ref 0.36–3.74)
WBC # BLD: 6.6 10*3/UL (ref 4.6–10.2)

## 2020-01-16 PROCEDURE — 99214 PR OFFICE/OUTPT VISIT, EST, LEVL IV, 30-39 MIN: ICD-10-PCS | Mod: S$GLB,,, | Performed by: NURSE PRACTITIONER

## 2020-01-16 PROCEDURE — 99214 OFFICE O/P EST MOD 30 MIN: CPT | Mod: S$GLB,,, | Performed by: NURSE PRACTITIONER

## 2020-01-16 RX ORDER — HEPARIN 100 UNIT/ML
500 SYRINGE INTRAVENOUS
Status: CANCELLED | OUTPATIENT
Start: 2020-01-16

## 2020-01-16 RX ORDER — OXYCODONE AND ACETAMINOPHEN 7.5; 325 MG/1; MG/1
1 TABLET ORAL EVERY 8 HOURS PRN
Qty: 90 TABLET | Refills: 0 | Status: SHIPPED | OUTPATIENT
Start: 2020-01-16 | End: 2020-03-11 | Stop reason: SDUPTHER

## 2020-01-16 RX ORDER — SODIUM CHLORIDE 0.9 % (FLUSH) 0.9 %
10 SYRINGE (ML) INJECTION
Status: CANCELLED | OUTPATIENT
Start: 2020-01-16

## 2020-01-16 RX ORDER — LEVOTHYROXINE SODIUM 200 UG/1
200 TABLET ORAL DAILY
Qty: 30 TABLET | Refills: 11 | Status: SHIPPED | OUTPATIENT
Start: 2020-01-16 | End: 2020-04-14 | Stop reason: SDUPTHER

## 2020-01-16 NOTE — PROGRESS NOTES
Subjective:      Patient ID: Jaime Motta is a 54 y.o. male.    Oncology History:     Malignant neoplasm of left lung stage 4    4/30/2017 Imaging Significant Findings     CT Thoracic spine shows posterior midline mass athethe T 23-L1 with soft tissue mass 3.5 cm       5/19/2017 Surgery     [facility]sx at [facility] with Dr Harry Thoracic and lumbar laminetomy at T12-L1        5/22/2017 Imaging Significant Findings     MRI Thoracic Spine done for back pain showing large erosive mass in upper lumbar spine      5/24/2017 Pathology Significant Finding     Met adenoca CK7 + full path report not available from [facility]       8/10/2017 Pathology Significant Finding     Cytology LML mass adenoca well differeniated, TTF1+    EGRF/ MMR/MSi Not Detected      9/7/2017 - 3/1/2018 Chemotherapy       Carbo/Alimta cycle 1  Carbo/Alimta/Keytruda cycle 2 - 6      2/28/2019 Imaging Significant Findings     CT C/A/P stable disease as compared to 10/26/18           Chief Complaint: Lung Cancer (Left lung )    Jaime Motta is here for OP PEMBROLIZUMAB 200MG Q3W      Treatment Goal:   Palliative      Status:   Active      Start Date:   3/23/2018      End Date:   12/26/2019      Provider:   Faith Swain NP      Chemotherapy:   pembrolizumab (KEYTRUDA) 200 mg in sodium chloride 0.9% 100 mL chemo infusion, 200 mg, Intravenous, Clinic/HOD 1 time, 28 of 28 cycles    Mr Motta has metastatic lung cancer and has been on Keytruda q 3 weeks since 3/23/18 and xgeva q 6 weeks.   Today the main concern is a recent ED visit on 8/30/19 for new onset lower abdominal pain and diarrhea after receiving Keytruda on 8/29/19. He had a Ct a/p which showed interval development of bowel wall thickening involving the cecum measuring up to 1.8 cm in thickness. Could not r/o ischemia vis inflammation/infection. Pt walked into clinic today to discuss results of CT, pt continues to have intermittent lowe abd pain but no diarrhea over the weekend. Will hold keytruda and prescribe  high dose steroid taper for the next 6 weeks as well as imaging after he completes steroids.    19 Visit;  Today in clinic, he has no c/o diarrhea or abdominal pain. He is currently week 5 of 6 week taper of steroids. He states he is feeling good. We will repeat Ct scan to ensure colitis resolved prior to resuming keytruda once he has completed his steroids.     10/18/19 Visit:  Pt has completed steroid taper and states feeling good. Diarrhea has resolved and recent CT scan shows DOMENIC for colitis.     19 visit:  He has no c/o after resuming keytruda due to holiday r/t episode of colitis in September. He c/o of left ear pain in which he went to ED yesterday, prescribed ear drops. Today complete cecum impaction noted to left ear. Advised to continue drops and f/u with PCP. Labs reviewed and pt to continue with treatment.     12/3/19 visit:  No c/o of diarrhea with most recent treatment but c/o knee and shoulder aches. Labs reviewed and pt is cleared for tx today. Will plan to rescan in 6-8 weeks, if scans are stable we discussed possible tx holiday. He will complete 2 years of IO in 3/2020.     19 visit:  C/o of new onset facial swelling which resolved after pt discussed with pharmacy and was told to hold Lisinopril. Discussed ACE allergy and advised to permanently dc med. BP WNL today, will continue to monitor. Pt states some mild diarrhea but well controlled with imodium. Labs reviewed and pt cleared for IO for Thursday.     20 visit:  Pt feeling ok today, states started mild cough and sinus drainage last week but denies any fever. TSH markedly increased, educated pt again on correct administration of oral med. Otherwise labs are ok for tx today. Repeat imaging in 3/20 planned.      IMAGIN19 CT  C/A/P: on 19 show stable disease with no new lytic lesions. Of note, he does have lytic and asclerotic lesion of L1 And L3 as well as right iliac wing but they remain stable.  We will  increase his percocet to 7.5/325mg TID for improved pain control.   CT Abdomen Pelvis  Without Contrast                                RADIOLOGY REPORT        PT NAME: AISHA GREENE      [facility] IMAG[facility]     : 1965 M 54             [address] [facility]    ACCT: [id number]                                              [place]    [facility] Rec #: [id number]                                        [id number]    [facility]             Procedure: CT ABD   PELVIS WO/W     CONTRAST    REQUISITION #: [id number]      REPORT #: [id number]           DATE OF EXAM: 10/10/19    TIME OF EXAM: 930       CMS MANDATED QUALITY DATA - CT RADIATION ? 436        All CT scans at this facility utilize dose modulation, iterative     reconstruction, and/or weight based dosing when appropriate to reduce     radiation dose to as low as reasonably achievable.        HISTORY:Lung cancer. Colitis.        COMPARISON:2019.        TECHNIQUE: A CT of the abdomen and pelvis was performed following the     administration of oral contrast and prior to and following administration of    100 cc of Isovue-300 intravenous contrast Estimated radiation dose is  20      mSv. All CT scans at this facility uses dose modulation and/or weight base     dosing when appropriate to reduce radiation dose to as low as reasonably     achievable.        FINDINGS: The liver, gallbladder, pancreas and spleen are unremarkable.     Adrenal glands are normal in appearance. No amount of the kidneys.        There is a small hiatal hernia. Bowel wall thickening of the cecum near the     ileocecal valve is decreased since prior study. No abscess or free air. No     abnormally enlarged or necrotic lymph nodes.        Sclerotic bone lesions of L1 and L3 consistent with metastatic disease are     unchanged. Arthritic changes of both sacroiliac joints and ill-defined     sclerosis of the right iliac bone are stable.         IMPRESSION:        Interval decrease in wall thickening of the cecum. Finding is of uncertain     etiology.        Stable metastatic disease to bone.        DICTATING PHYSICIAN:  LUIS LEÓN MD                   Date Dictated: 10/10/19 0939        Signed By:  LUIS LEÓN MD <Electronically signed by LUIS LEÓN MD in      OV>    Date Signed:  10/10/19 0945     CC: ANURAG REDDY NP ; ANURAG REDDY NP      ADMITTING PHYSICIAN:                                                                                                     ORDERING PHY: ANURAG REDDY NP                                                                                                                                                          ATTENDING PHY: ANURAG REDDY NP    Patient Status:  REG CLI    Admit Service Date: 10/10/19             Past Medical History:   Diagnosis Date    Bone cancer     Cancer     Hypertension 12/24/2019    Lung cancer     Thyroid disease      Family History   Problem Relation Age of Onset    Bladder Cancer Mother     Cancer Father     Rectal cancer Brother     Lung cancer Brother     Pancreatic cancer Other     Esophageal cancer Other      Social History     Socioeconomic History    Marital status: Single     Spouse name: Not on file    Number of children: Not on file    Years of education: Not on file    Highest education level: Not on file   Occupational History    Not on file   Social Needs    Financial resource strain: Not on file    Food insecurity:     Worry: Not on file     Inability: Not on file    Transportation needs:     Medical: Not on file     Non-medical: Not on file   Tobacco Use    Smoking status: Current Every Day Smoker     Packs/day: 0.25     Years: 30.00     Pack years: 7.50    Smokeless tobacco: Never Used   Substance and Sexual Activity    Alcohol use: Yes     Alcohol/week: 3.0 standard drinks     Types: 3 Cans of beer per week     Comment: occasionally    Drug use: No     Sexual activity: Not on file   Lifestyle    Physical activity:     Days per week: Not on file     Minutes per session: Not on file    Stress: Not on file   Relationships    Social connections:     Talks on phone: Not on file     Gets together: Not on file     Attends Gnosticist service: Not on file     Active member of club or organization: Not on file     Attends meetings of clubs or organizations: Not on file     Relationship status: Not on file   Other Topics Concern    Not on file   Social History Narrative    Not on file     Past Surgical History:   Procedure Laterality Date    BACK SURGERY      FINGER SURGERY      HERNIA REPAIR      LUNG BIOPSY      PORTACATH PLACEMENT             Review of systems:  Review of Systems   Constitutional: Positive for activity change. Negative for chills, fever and unexpected weight change.   HENT: Negative for dental problem, mouth sores, sore throat and trouble swallowing.    Eyes: Negative for visual disturbance.   Respiratory: Negative for cough, chest tightness and shortness of breath.    Cardiovascular: Negative for chest pain, palpitations and leg swelling.   Gastrointestinal: Positive for abdominal distention and abdominal pain. Negative for blood in stool, constipation, diarrhea, nausea, rectal pain and vomiting.   Genitourinary: Negative for dysuria and hematuria.   Musculoskeletal: Positive for arthralgias, back pain, gait problem (left hip pain and weakness ) and neck pain (pt c/o pain to his right shoulder and neck area; reports radiates down right arm; reports he has been trying to cut back on painting to see if this alleviates pain). Negative for joint swelling and myalgias.   Skin: Negative for pallor and rash.   Neurological: Negative for dizziness, syncope, weakness, light-headedness, numbness and headaches.   Hematological: Negative for adenopathy. Does not bruise/bleed easily.   Psychiatric/Behavioral: Negative for agitation and suicidal ideas.        Objective:     Physical Exam   Constitutional: He is oriented to person, place, and time. He appears well-developed and well-nourished.   Neck: Normal range of motion.   Cardiovascular: Normal rate and regular rhythm.   Pulmonary/Chest: Effort normal and breath sounds normal.   Musculoskeletal: He exhibits tenderness.        Right shoulder: He exhibits decreased range of motion and tenderness.   Neurological: He is alert and oriented to person, place, and time.   Psychiatric: He has a normal mood and affect.     Vitals:    01/16/20 1018   BP: (!) 148/91   Pulse: 81   Resp: 10   Temp: 98.6 °F (37 °C)   Body surface area is 1.8 meters squared.    Labs:  1/16/20:  TSH 17.03K 3.5   WBC 6.6 HGB 13.7 HCT 41.0     Assessment:      1. Hypothyroidism, unspecified type    2. Malignant neoplasm of left lung stage 4    3. Metastatic cancer to bone           Plan:   Hypothyroidism, unspecified type  -     TSH; Standing    Malignant neoplasm of left lung stage 4  -     Comprehensive metabolic panel; Standing  -     CBC auto differential; Standing    Metastatic cancer to bone  -     Comprehensive metabolic panel; Standing  -     CBC auto differential; Standing      1. Will continue Keytruda as planned. Xgeva due at next visit.   3. Currently no c/o diarrhea but advised to call if it occurs.  4. Pt will complete 2 years of IO in 3/2020 if scans still appear stable to no disease, discussed possible tx holiday.   5. RTC in 3 weeks with labs.   6. Will increase synthroid to 200 mcg daily.  7. Pt was advised by pharmacy to hold lisinopril due to c/o of facial swelling, will permanently dc medication and monitor BP. Currently BP wnl, no indication to treat.     Faith Swain, ANP

## 2020-02-12 ENCOUNTER — OFFICE VISIT (OUTPATIENT)
Dept: HEMATOLOGY/ONCOLOGY | Facility: CLINIC | Age: 55
End: 2020-02-12
Payer: MEDICAID

## 2020-02-12 VITALS
HEART RATE: 85 BPM | SYSTOLIC BLOOD PRESSURE: 134 MMHG | RESPIRATION RATE: 16 BRPM | OXYGEN SATURATION: 94 % | WEIGHT: 160.38 LBS | TEMPERATURE: 98 F | DIASTOLIC BLOOD PRESSURE: 86 MMHG | HEIGHT: 66 IN | BODY MASS INDEX: 25.78 KG/M2

## 2020-02-12 DIAGNOSIS — C79.51 METASTATIC CANCER TO BONE: Primary | ICD-10-CM

## 2020-02-12 DIAGNOSIS — C34.92 MALIGNANT NEOPLASM OF LEFT LUNG STAGE 4: ICD-10-CM

## 2020-02-12 DIAGNOSIS — R19.7 DIARRHEA, UNSPECIFIED TYPE: ICD-10-CM

## 2020-02-12 DIAGNOSIS — G89.3 CANCER RELATED PAIN: ICD-10-CM

## 2020-02-12 LAB
ALBUMIN SERPL BCP-MCNC: 3.7 G/DL (ref 3.4–5)
ALBUMIN/GLOBULIN RATIO: 0.82 RATIO (ref 1.1–1.8)
ALP SERPL-CCNC: 69 U/L (ref 46–116)
ALT SERPL W P-5'-P-CCNC: 53 U/L (ref 12–78)
AST SERPL-CCNC: 57 U/L (ref 15–37)
BASOPHILS NFR SNV MANUAL: 0.5 % (ref 0–3)
BILIRUB SERPL-MCNC: 0.3 MG/DL (ref 0–1)
BUN SERPL-MCNC: 10 MG/DL (ref 7–18)
BUN/CREAT SERPL: 9.52 RATIO (ref 7–18)
CALCIUM SERPL-MCNC: 9.8 MG/DL (ref 8.8–10.5)
CHLORIDE SERPL-SCNC: 102 MMOL/L (ref 100–108)
CO2 SERPL-SCNC: 28 MMOL/L (ref 21–32)
CREAT SERPL-MCNC: 1.05 MG/DL (ref 0.7–1.3)
EOSINOPHIL NFR SNV MANUAL: 1.9 % (ref 1–3)
ERYTHROCYTE [DISTWIDTH] IN BLOOD BY AUTOMATED COUNT: 15.9 % (ref 12.5–18)
GFR ESTIMATION: > 60
GLOBULIN: 4.5 G/DL (ref 2.3–3.5)
GLUCOSE SERPL-MCNC: 105 MG/DL (ref 70–110)
HCT VFR BLD AUTO: 39.3 % (ref 42–52)
HGB BLD-MCNC: 12.6 G/DL (ref 14–18)
LYMPHOCYTES NFR SNV MANUAL: 19.3 % (ref 25–40)
MANUAL NRBC PER 100 CELLS: 0 %
MCH RBC QN AUTO: 32.1 PG (ref 27–31.2)
MCHC RBC AUTO-ENTMCNC: 32.1 G/DL (ref 31.8–35.4)
MCV RBC AUTO: 100 FL (ref 80–97)
MONOCYTES/100 LEUKOCYTES: 6.9 % (ref 1–15)
NEUTROPHILS NFR BLD: 3.99 10*3/UL (ref 1.8–7.7)
NEUTROPHILS NFR SNV MANUAL: 70.7 % (ref 37–80)
PLATELETS: 106 10*3/UL (ref 142–424)
POTASSIUM SERPL-SCNC: 3.9 MMOL/L (ref 3.6–5.2)
PROT SERPL-MCNC: 8.2 G/DL (ref 6.4–8.2)
RBC # BLD AUTO: 3.93 10*6/UL (ref 4.7–6.1)
SODIUM BLD-SCNC: 138 MMOL/L (ref 135–145)
TSH SERPL DL<=0.005 MIU/L-ACNC: 17.14 UIU/ML (ref 0.36–3.74)
WBC # BLD: 5.7 10*3/UL (ref 4.6–10.2)

## 2020-02-12 PROCEDURE — 99214 OFFICE O/P EST MOD 30 MIN: CPT | Mod: S$GLB,,, | Performed by: NURSE PRACTITIONER

## 2020-02-12 PROCEDURE — 99214 PR OFFICE/OUTPT VISIT, EST, LEVL IV, 30-39 MIN: ICD-10-PCS | Mod: S$GLB,,, | Performed by: NURSE PRACTITIONER

## 2020-02-12 RX ORDER — HEPARIN 100 UNIT/ML
500 SYRINGE INTRAVENOUS
Status: CANCELLED | OUTPATIENT
Start: 2020-02-12

## 2020-02-12 RX ORDER — SODIUM CHLORIDE 0.9 % (FLUSH) 0.9 %
10 SYRINGE (ML) INJECTION
Status: CANCELLED | OUTPATIENT
Start: 2020-02-12

## 2020-02-12 NOTE — PROGRESS NOTES
Subjective:      Patient ID: Jaime Motta is a 54 y.o. male.    Oncology History:     Malignant neoplasm of left lung stage 4    4/30/2017 Imaging Significant Findings     CT Thoracic spine shows posterior midline mass athethe T 23-L1 with soft tissue mass 3.5 cm       5/19/2017 Surgery     Neurosx at [facility]-S with Dr Harry Thoracic and lumbar laminetomy at T12-L1        5/22/2017 Imaging Significant Findings     MRI Thoracic Spine done for back pain showing large erosive mass in upper lumbar spine      5/24/2017 Pathology Significant Finding     Met adenoca CK7 + full path report not available from [facility]       8/10/2017 Pathology Significant Finding     Cytology LML mass adenoca well differeniated, TTF1+    EGRF/ MMR/MSi Not Detected      9/7/2017 - 3/1/2018 Chemotherapy       Carbo/Alimta cycle 1  Carbo/Alimta/Keytruda cycle 2 - 6      2/28/2019 Imaging Significant Findings     CT C/A/P stable disease as compared to 10/26/18           Chief Complaint: Cancer    Jaime Motta is here for OP PEMBROLIZUMAB 200MG Q3W      Treatment Goal:   Palliative      Status:   Active      Start Date:   3/23/2018      End Date:   2/27/2020 (Planned)      Provider:   Faith Swain NP      Chemotherapy:   pembrolizumab (KEYTRUDA) 200 mg in sodium chloride 0.9% 100 mL chemo infusion, 200 mg, Intravenous, Clinic/HOD 1 time, 29 of 31 cycles    Mr Motta has metastatic lung cancer and has been on Keytruda q 3 weeks since 3/23/18 and xgeva q 6 weeks.   Today the main concern is a recent ED visit on 8/30/19 for new onset lower abdominal pain and diarrhea after receiving Keytruda on 8/29/19. He had a Ct a/p which showed interval development of bowel wall thickening involving the cecum measuring up to 1.8 cm in thickness. Could not r/o ischemia vis inflammation/infection. Pt walked into clinic today to discuss results of CT, pt continues to have intermittent lowe abd pain but no diarrhea over the weekend. Will hold keytruda and prescribe high dose  steroid taper for the next 6 weeks as well as imaging after he completes steroids.    9/20/19 Visit;  Today in clinic, he has no c/o diarrhea or abdominal pain. He is currently week 5 of 6 week taper of steroids. He states he is feeling good. We will repeat Ct scan to ensure colitis resolved prior to resuming keytruda once he has completed his steroids.     10/18/19 Visit:  Pt has completed steroid taper and states feeling good. Diarrhea has resolved and recent CT scan shows DOMENIC for colitis.     11/12/19 visit:  He has no c/o after resuming keytruda due to holiday r/t episode of colitis in September. He c/o of left ear pain in which he went to ED yesterday, prescribed ear drops. Today complete cecum impaction noted to left ear. Advised to continue drops and f/u with PCP. Labs reviewed and pt to continue with treatment.     12/3/19 visit:  No c/o of diarrhea with most recent treatment but c/o knee and shoulder aches. Labs reviewed and pt is cleared for tx today. Will plan to rescan in 6-8 weeks, if scans are stable we discussed possible tx holiday. He will complete 2 years of IO in 3/2020.     12/24/19 visit:  C/o of new onset facial swelling which resolved after pt discussed with pharmacy and was told to hold Lisinopril. Discussed ACE allergy and advised to permanently dc med. BP WNL today, will continue to monitor. Pt states some mild diarrhea but well controlled with imodium. Labs reviewed and pt cleared for IO for Thursday.     1/16/20 visit:  Pt feeling ok today, states started mild cough and sinus drainage last week but denies any fever. TSH markedly increased, educated pt again on correct administration of oral med. Otherwise labs are ok for tx today. Repeat imaging in 3/20 planned.     2/12/20 visit:  No new c/o feeling good, no diarrhea noted this week, Labs reviewed and pt cleared for xgeva and cycle 30 of Keytruda. Will repeat imaging and if stable scans will offer pt tx holiday from IO. Will continue  xgeva.      IMAGIN19 CT  C/A/P: on 19 show stable disease with no new lytic lesions. Of note, he does have lytic and asclerotic lesion of L1 And L3 as well as right iliac wing but they remain stable.  We will increase his percocet to 7.5/325mg TID for improved pain control.   CT Abdomen Pelvis  Without Contrast                                RADIOLOGY REPORT        PT NAME: AISHA GREENE      [facility]     : 1965 M 54             [address]    ACCT: [id number]                                              [facility] Rec #: [id number]                                        [id number]    [facility]             Procedure: CT ABD   PELVIS WO/W     CONTRAST    REQUISITION #: [id number]      REPORT #: [id number]           DATE OF EXAM: 10/10/19    TIME OF EXAM: 0930       CMS MANDATED QUALITY DATA - CT RADIATION ? 436        All CT scans at this facility utilize dose modulation, iterative     reconstruction, and/or weight based dosing when appropriate to reduce     radiation dose to as low as reasonably achievable.        HISTORY:Lung cancer. Colitis.        COMPARISON:2019.        TECHNIQUE: A CT of the abdomen and pelvis was performed following the     administration of oral contrast and prior to and following administration of    100 cc of Isovue-300 intravenous contrast Estimated radiation dose is  20      mSv. All CT scans at this facility uses dose modulation and/or weight base     dosing when appropriate to reduce radiation dose to as low as reasonably     achievable.        FINDINGS: The liver, gallbladder, pancreas and spleen are unremarkable.     Adrenal glands are normal in appearance. No amount of the kidneys.        There is a small hiatal hernia. Bowel wall thickening of the cecum near the     ileocecal valve is decreased since prior study. No abscess or free air. No     abnormally enlarged or necrotic lymph nodes.         Sclerotic bone lesions of L1 and L3 consistent with metastatic disease are     unchanged. Arthritic changes of both sacroiliac joints and ill-defined     sclerosis of the right iliac bone are stable.        IMPRESSION:        Interval decrease in wall thickening of the cecum. Finding is of uncertain     etiology.        Stable metastatic disease to bone.        DICTATING PHYSICIAN:  LUIS LEÓN MD                   Date Dictated: 10/10/19 0939        Signed By:  LUIS LEÓN MD <Electronically signed by LUIS LEÓN MD in      OV>    Date Signed:  10/10/19 0945     CC: ANURAG REDDY NP ; ANURAG REDDY NP      ADMITTING PHYSICIAN:                                                                                                     ORDERING PHY: ANURAG REDDY NP                                                                                                                                                          ATTENDING PHY: ANURAG REDDY NP    Patient Status:  REG CLI    Admit Service Date: 10/10/19             Past Medical History:   Diagnosis Date    Bone cancer     Cancer     Hypertension 12/24/2019    Lung cancer     Thyroid disease      Family History   Problem Relation Age of Onset    Bladder Cancer Mother     Cancer Father     Rectal cancer Brother     Lung cancer Brother     Pancreatic cancer Other     Esophageal cancer Other      Social History     Socioeconomic History    Marital status: Single     Spouse name: Not on file    Number of children: Not on file    Years of education: Not on file    Highest education level: Not on file   Occupational History    Not on file   Social Needs    Financial resource strain: Not on file    Food insecurity:     Worry: Not on file     Inability: Not on file    Transportation needs:     Medical: Not on file     Non-medical: Not on file   Tobacco Use    Smoking status: Current Every Day Smoker     Packs/day: 0.25     Years: 30.00     Pack years:  7.50    Smokeless tobacco: Never Used   Substance and Sexual Activity    Alcohol use: Yes     Alcohol/week: 3.0 standard drinks     Types: 3 Cans of beer per week     Comment: occasionally    Drug use: No    Sexual activity: Not on file   Lifestyle    Physical activity:     Days per week: Not on file     Minutes per session: Not on file    Stress: Not on file   Relationships    Social connections:     Talks on phone: Not on file     Gets together: Not on file     Attends [employer] service: Not on file     Active member of club or organization: Not on file     Attends meetings of clubs or organizations: Not on file     Relationship status: Not on file   Other Topics Concern    Not on file   Social History Narrative    Not on file     Past Surgical History:   Procedure Laterality Date    BACK SURGERY      FINGER SURGERY      HERNIA REPAIR      LUNG BIOPSY      PORTACATH PLACEMENT             Review of systems:  Review of Systems   Constitutional: Positive for activity change. Negative for chills, fever and unexpected weight change.   HENT: Negative for dental problem, mouth sores, sore throat and trouble swallowing.    Eyes: Negative for visual disturbance.   Respiratory: Negative for cough, chest tightness and shortness of breath.    Cardiovascular: Negative for chest pain, palpitations and leg swelling.   Gastrointestinal: Positive for abdominal distention and abdominal pain. Negative for blood in stool, constipation, diarrhea, nausea, rectal pain and vomiting.   Genitourinary: Negative for dysuria and hematuria.   Musculoskeletal: Positive for arthralgias, back pain, gait problem (left hip pain and weakness ) and neck pain (pt c/o pain to his right shoulder and neck area; reports radiates down right arm; reports he has been trying to cut back on painting to see if this alleviates pain). Negative for joint swelling and myalgias.   Skin: Negative for pallor and rash.   Neurological: Negative for  dizziness, syncope, weakness, light-headedness, numbness and headaches.   Hematological: Negative for adenopathy. Does not bruise/bleed easily.   Psychiatric/Behavioral: Negative for agitation and suicidal ideas.       Objective:     Physical Exam   Constitutional: He is oriented to person, place, and time. He appears well-developed and well-nourished.   Neck: Normal range of motion.   Cardiovascular: Normal rate and regular rhythm.   Pulmonary/Chest: Effort normal and breath sounds normal.   Musculoskeletal: He exhibits tenderness.        Right shoulder: He exhibits decreased range of motion and tenderness.   Neurological: He is alert and oriented to person, place, and time.   Psychiatric: He has a normal mood and affect.     Vitals:    02/12/20 0947   BP: 134/86   Pulse: 85   Resp: 16   Temp: 98.1 °F (36.7 °C)   Body surface area is 1.84 meters squared.    Labs:  2/12/20 labs reviewed    Assessment:      1. Metastatic cancer to bone    2. Cancer related pain    3. Malignant neoplasm of left lung stage 4    4. Diarrhea, unspecified type           Plan:   Metastatic cancer to bone    Cancer related pain    Malignant neoplasm of left lung stage 4    Diarrhea, unspecified type      1. Will continue Keytruda as planned. Xgeva due at this visit.   3. Currently no c/o diarrhea but advised to call if it occurs.  4. Pt will complete 2 years of IO in 3/2020 if scans still appear stable to no disease, discussed possible tx holiday.   5. RTC in 3 weeks with labs.   6. Will increase synthroid to 200 mcg daily.      Faith Swain, ANP

## 2020-03-11 ENCOUNTER — OFFICE VISIT (OUTPATIENT)
Dept: HEMATOLOGY/ONCOLOGY | Facility: CLINIC | Age: 55
End: 2020-03-11
Payer: MEDICAID

## 2020-03-11 VITALS
SYSTOLIC BLOOD PRESSURE: 126 MMHG | BODY MASS INDEX: 27.86 KG/M2 | WEIGHT: 163.19 LBS | TEMPERATURE: 98 F | OXYGEN SATURATION: 94 % | DIASTOLIC BLOOD PRESSURE: 77 MMHG | HEIGHT: 64 IN | RESPIRATION RATE: 12 BRPM | HEART RATE: 110 BPM

## 2020-03-11 DIAGNOSIS — C34.92 MALIGNANT NEOPLASM OF LEFT LUNG STAGE 4: ICD-10-CM

## 2020-03-11 DIAGNOSIS — C79.51 METASTATIC CANCER TO BONE: ICD-10-CM

## 2020-03-11 PROCEDURE — 99213 OFFICE O/P EST LOW 20 MIN: CPT | Mod: S$GLB,,, | Performed by: NURSE PRACTITIONER

## 2020-03-11 PROCEDURE — 99213 PR OFFICE/OUTPT VISIT, EST, LEVL III, 20-29 MIN: ICD-10-PCS | Mod: S$GLB,,, | Performed by: NURSE PRACTITIONER

## 2020-03-11 RX ORDER — OXYCODONE AND ACETAMINOPHEN 7.5; 325 MG/1; MG/1
1 TABLET ORAL EVERY 8 HOURS PRN
Qty: 45 TABLET | Refills: 0 | Status: SHIPPED | OUTPATIENT
Start: 2020-03-11 | End: 2020-03-25 | Stop reason: SDUPTHER

## 2020-03-11 NOTE — PROGRESS NOTES
Subjective:      Patient ID: Jaime Motta is a 54 y.o. male.    Oncology History:     Malignant neoplasm of left lung stage 4    4/30/2017 Imaging Significant Findings     CT Thoracic spine shows posterior midline mass athethe T 23-L1 with soft tissue mass 3.5 cm       5/19/2017 Surgery     Neurosx at [facility] with Dr Harry Thoracic and lumbar laminetomy at T12-L1        5/22/2017 Imaging Significant Findings     MRI Thoracic Spine done for back pain showing large erosive mass in upper lumbar spine      5/24/2017 Pathology Significant Finding     Met adenoca CK7 + full path report not available from [facility]       8/10/2017 Pathology Significant Finding     Cytology LML mass adenoca well differeniated, TTF1+    EGRF/ MMR/MSi Not Detected      9/7/2017 - 3/1/2018 Chemotherapy       Carbo/Alimta cycle 1  Carbo/Alimta/Keytruda cycle 2 - 6      2/28/2019 Imaging Significant Findings     CT C/A/P stable disease as compared to 10/26/18           Chief Complaint: Lung Cancer (metastatic cancer to the bone )    Jaime Motta is here for OP PEMBROLIZUMAB 200MG Q3W      Treatment Goal:   Palliative      Status:   Active      Start Date:   3/23/2018      End Date:   3/4/2020 (Planned)      Provider:   Faith Swain NP      Chemotherapy:   pembrolizumab (KEYTRUDA) 200 mg in sodium chloride 0.9% 100 mL chemo infusion, 200 mg, Intravenous, Clinic/HOD 1 time, 30 of 31 cycles    Mr Motta has metastatic lung cancer and has been on Keytruda q 3 weeks since 3/23/18 and xgeva q 6 weeks.   Today the main concern is a recent ED visit on 8/30/19 for new onset lower abdominal pain and diarrhea after receiving Keytruda on 8/29/19. He had a Ct a/p which showed interval development of bowel wall thickening involving the cecum measuring up to 1.8 cm in thickness. Could not r/o ischemia vis inflammation/infection. Pt walked into clinic today to discuss results of CT, pt continues to have intermittent lowe abd pain but no diarrhea over the weekend. Will  hold keytruda and prescribe high dose steroid taper for the next 6 weeks as well as imaging after he completes steroids.    9/20/19 Visit;  Today in clinic, he has no c/o diarrhea or abdominal pain. He is currently week 5 of 6 week taper of steroids. He states he is feeling good. We will repeat Ct scan to ensure colitis resolved prior to resuming keytruda once he has completed his steroids.     10/18/19 Visit:  Pt has completed steroid taper and states feeling good. Diarrhea has resolved and recent CT scan shows DOMENIC for colitis.     11/12/19 visit:  He has no c/o after resuming keytruda due to holiday r/t episode of colitis in September. He c/o of left ear pain in which he went to ED yesterday, prescribed ear drops. Today complete cecum impaction noted to left ear. Advised to continue drops and f/u with PCP. Labs reviewed and pt to continue with treatment.     12/3/19 visit:  No c/o of diarrhea with most recent treatment but c/o knee and shoulder aches. Labs reviewed and pt is cleared for tx today. Will plan to rescan in 6-8 weeks, if scans are stable we discussed possible tx holiday. He will complete 2 years of IO in 3/2020.     12/24/19 visit:  C/o of new onset facial swelling which resolved after pt discussed with pharmacy and was told to hold Lisinopril. Discussed ACE allergy and advised to permanently dc med. BP WNL today, will continue to monitor. Pt states some mild diarrhea but well controlled with imodium. Labs reviewed and pt cleared for IO for Thursday.     1/16/20 visit:  Pt feeling ok today, states started mild cough and sinus drainage last week but denies any fever. TSH markedly increased, educated pt again on correct administration of oral med. Otherwise labs are ok for tx today. Repeat imaging in 3/20 planned.     2/12/20 visit:  No new c/o feeling good, no diarrhea noted this week, Labs reviewed and pt cleared for xgeva and cycle 30 of Keytruda. Will repeat imaging and if stable scans will offer pt  tx holiday from IO. Will continue xgeva.     3/11/20visit:  Pt has missed last 2 appointment and did not do scheduled CT scans as planned. Today we discussed importance of med compliance and also discussed pain medication. Contract signed and pt is aware that if he habitually misses appointments we will not be able to provide treatment.      IMAGIN19 CT  C/A/P: on 19 show stable disease with no new lytic lesions. Of note, he does have lytic and asclerotic lesion of L1 And L3 as well as right iliac wing but they remain stable.  We will increase his percocet to 7.5/325mg TID for improved pain control.   CT Abdomen Pelvis  Without Contrast                                RADIOLOGY REPORT        PT NAME: AISHA GREENE      [facility] IMAG[facility]     : 1965 M 54             [address]    ACCT: [id number]                                              [place] Rec #: [id number]                                        [id number]    [facility]             Procedure: CT ABD   PELVIS WO/W     CONTRAST    REQUISITION #: [id number]      REPORT #: [id number]           DATE OF EXAM: 10/10/19    TIME OF EXAM: 0930       CMS MANDATED QUALITY DATA - CT RADIATION ? 436        All CT scans at this facility utilize dose modulation, iterative     reconstruction, and/or weight based dosing when appropriate to reduce     radiation dose to as low as reasonably achievable.        HISTORY:Lung cancer. Colitis.        COMPARISON:2019.        TECHNIQUE: A CT of the abdomen and pelvis was performed following the     administration of oral contrast and prior to and following administration of    100 cc of Isovue-300 intravenous contrast Estimated radiation dose is  20      mSv. All CT scans at this facility uses dose modulation and/or weight base     dosing when appropriate to reduce radiation dose to as low as reasonably     achievable.        FINDINGS: The liver,  gallbladder, pancreas and spleen are unremarkable.     Adrenal glands are normal in appearance. No amount of the kidneys.        There is a small hiatal hernia. Bowel wall thickening of the cecum near the     ileocecal valve is decreased since prior study. No abscess or free air. No     abnormally enlarged or necrotic lymph nodes.        Sclerotic bone lesions of L1 and L3 consistent with metastatic disease are     unchanged. Arthritic changes of both sacroiliac joints and ill-defined     sclerosis of the right iliac bone are stable.        IMPRESSION:        Interval decrease in wall thickening of the cecum. Finding is of uncertain     etiology.        Stable metastatic disease to bone.        DICTATING PHYSICIAN:  LUIS LEÓN MD                   Date Dictated: 10/10/19 0939        Signed By:  LUIS LEÓN MD <Electronically signed by LUIS LEÓN MD in      OV>    Date Signed:  10/10/19 0945     CC: ANURAG REDDY NP ; ANURAG REDDY NP      ADMITTING PHYSICIAN:                                                                                                     ORDERING PHY: ANURAG REDDY NP                                                                                                                                                          ATTENDING PHY: ANURAG REDDY NP    Patient Status:  REG CLI    Admit Service Date: 10/10/19             Past Medical History:   Diagnosis Date    Bone cancer     Cancer     Hypertension 12/24/2019    Lung cancer     Thyroid disease      Family History   Problem Relation Age of Onset    Bladder Cancer Mother     Cancer Father     Rectal cancer Brother     Lung cancer Brother     Pancreatic cancer Other     Esophageal cancer Other      Social History     Socioeconomic History    Marital status: Single     Spouse name: Not on file    Number of children: Not on file    Years of education: Not on file    Highest education level: Not on file   Occupational History     Not on file   Social Needs    Financial resource strain: Not on file    Food insecurity:     Worry: Not on file     Inability: Not on file    Transportation needs:     Medical: Not on file     Non-medical: Not on file   Tobacco Use    Smoking status: Current Every Day Smoker     Packs/day: 0.25     Years: 30.00     Pack years: 7.50    Smokeless tobacco: Never Used   Substance and Sexual Activity    Alcohol use: Yes     Alcohol/week: 3.0 standard drinks     Types: 3 Cans of beer per week     Comment: occasionally    Drug use: No    Sexual activity: Not on file   Lifestyle    Physical activity:     Days per week: Not on file     Minutes per session: Not on file    Stress: Not on file   Relationships    Social connections:     Talks on phone: Not on file     Gets together: Not on file     Attends Muslim service: Not on file     Active member of club or organization: Not on file     Attends meetings of clubs or organizations: Not on file     Relationship status: Not on file   Other Topics Concern    Not on file   Social History Narrative    Not on file     Past Surgical History:   Procedure Laterality Date    BACK SURGERY      FINGER SURGERY      HERNIA REPAIR      LUNG BIOPSY      PORTACATH PLACEMENT             Review of systems:  Review of Systems   Constitutional: Positive for activity change. Negative for chills, fever and unexpected weight change.   HENT: Negative for dental problem, mouth sores, sore throat and trouble swallowing.    Eyes: Negative for visual disturbance.   Respiratory: Negative for cough, chest tightness and shortness of breath.    Cardiovascular: Negative for chest pain, palpitations and leg swelling.   Gastrointestinal: Positive for abdominal distention, abdominal pain and diarrhea. Negative for blood in stool, constipation, nausea, rectal pain and vomiting.   Genitourinary: Negative for dysuria and hematuria.   Musculoskeletal: Positive for arthralgias, back pain,  gait problem (left hip pain and weakness ) and neck pain (pt c/o pain to his right shoulder and neck area; reports radiates down right arm; reports he has been trying to cut back on painting to see if this alleviates pain). Negative for joint swelling and myalgias.   Skin: Negative for pallor and rash.   Neurological: Negative for dizziness, syncope, weakness, light-headedness, numbness and headaches.   Hematological: Negative for adenopathy. Does not bruise/bleed easily.   Psychiatric/Behavioral: Negative for agitation and suicidal ideas.       Objective:     Physical Exam   Constitutional: He is oriented to person, place, and time. He appears well-developed and well-nourished.   Neck: Normal range of motion.   Cardiovascular: Normal rate and regular rhythm.   Pulmonary/Chest: Effort normal and breath sounds normal.   Musculoskeletal: He exhibits tenderness.        Right shoulder: He exhibits decreased range of motion and tenderness.   Neurological: He is alert and oriented to person, place, and time.   Psychiatric: He has a normal mood and affect.     Vitals:    03/11/20 1129   BP: 126/77   Pulse: 110   Resp: 12   Temp: 97.6 °F (36.4 °C)   Body surface area is 1.83 meters squared.    Labs:  2/12/20 labs reviewed    Assessment:      1. Malignant neoplasm of left lung stage 4    2. Metastatic cancer to bone           Plan:   Malignant neoplasm of left lung stage 4  -     oxyCODONE-acetaminophen (PERCOCET) 7.5-325 mg per tablet; Take 1 tablet by mouth every 8 (eight) hours as needed for Pain.  Dispense: 45 tablet; Refill: 0    Metastatic cancer to bone  -     oxyCODONE-acetaminophen (PERCOCET) 7.5-325 mg per tablet; Take 1 tablet by mouth every 8 (eight) hours as needed for Pain.  Dispense: 45 tablet; Refill: 0      1.Pt has missed last 2 IO tx, will hold and send pt for scans to determine POC.   2. Pain contract signed today.  3. RTC in 2-3 weeks with scans.     Faith Swain, ANP

## 2020-03-25 ENCOUNTER — OFFICE VISIT (OUTPATIENT)
Dept: HEMATOLOGY/ONCOLOGY | Facility: CLINIC | Age: 55
End: 2020-03-25
Payer: MEDICAID

## 2020-03-25 VITALS
SYSTOLIC BLOOD PRESSURE: 142 MMHG | RESPIRATION RATE: 14 BRPM | WEIGHT: 160.31 LBS | OXYGEN SATURATION: 96 % | BODY MASS INDEX: 25.77 KG/M2 | DIASTOLIC BLOOD PRESSURE: 94 MMHG | HEIGHT: 66 IN | HEART RATE: 81 BPM

## 2020-03-25 DIAGNOSIS — G89.3 CANCER RELATED PAIN: ICD-10-CM

## 2020-03-25 DIAGNOSIS — C79.51 METASTATIC CANCER TO BONE: Primary | ICD-10-CM

## 2020-03-25 DIAGNOSIS — C34.92 MALIGNANT NEOPLASM OF LEFT LUNG STAGE 4: ICD-10-CM

## 2020-03-25 LAB
ALBUMIN SERPL BCP-MCNC: 3.8 G/DL (ref 3.4–5)
ALBUMIN/GLOBULIN RATIO: 0.95 RATIO (ref 1.1–1.8)
ALP SERPL-CCNC: 63 U/L (ref 46–116)
ALT SERPL W P-5'-P-CCNC: 32 U/L (ref 12–78)
AST SERPL-CCNC: 25 U/L (ref 15–37)
BASOPHILS NFR SNV MANUAL: 0.6 % (ref 0–3)
BILIRUB SERPL-MCNC: 0.4 MG/DL (ref 0–1)
BUN SERPL-MCNC: 10 MG/DL (ref 7–18)
BUN/CREAT SERPL: 10.63 RATIO (ref 7–18)
CALCIUM SERPL-MCNC: 9.4 MG/DL (ref 8.8–10.5)
CHLORIDE SERPL-SCNC: 100 MMOL/L (ref 100–108)
CO2 SERPL-SCNC: 30 MMOL/L (ref 21–32)
CREAT SERPL-MCNC: 0.94 MG/DL (ref 0.7–1.3)
EOSINOPHIL NFR SNV MANUAL: 2.3 % (ref 1–3)
ERYTHROCYTE [DISTWIDTH] IN BLOOD BY AUTOMATED COUNT: 16.5 % (ref 12.5–18)
GFR ESTIMATION: > 60
GLOBULIN: 4 G/DL (ref 2.3–3.5)
GLUCOSE SERPL-MCNC: 114 MG/DL (ref 70–110)
HCT VFR BLD AUTO: 40.6 % (ref 42–52)
HGB BLD-MCNC: 13.1 G/DL (ref 14–18)
LYMPHOCYTES NFR SNV MANUAL: 29.2 % (ref 25–40)
MANUAL NRBC PER 100 CELLS: 0 %
MCH RBC QN AUTO: 30.8 PG (ref 27–31.2)
MCHC RBC AUTO-ENTMCNC: 32.3 G/DL (ref 31.8–35.4)
MCV RBC AUTO: 95.3 FL (ref 80–97)
MONOCYTES/100 LEUKOCYTES: 8.9 % (ref 1–15)
NEUTROPHILS NFR BLD: 3.83 10*3/UL (ref 1.8–7.7)
NEUTROPHILS NFR SNV MANUAL: 58.1 % (ref 37–80)
PLATELETS: 136 10*3/UL (ref 142–424)
POTASSIUM SERPL-SCNC: 3.8 MMOL/L (ref 3.6–5.2)
PROT SERPL-MCNC: 7.8 G/DL (ref 6.4–8.2)
RBC # BLD AUTO: 4.26 10*6/UL (ref 4.7–6.1)
SODIUM BLD-SCNC: 137 MMOL/L (ref 135–145)
TSH SERPL DL<=0.005 MIU/L-ACNC: 3.09 UIU/ML (ref 0.36–3.74)
WBC # BLD: 6.6 10*3/UL (ref 4.6–10.2)

## 2020-03-25 PROCEDURE — 99214 OFFICE O/P EST MOD 30 MIN: CPT | Mod: S$GLB,,, | Performed by: NURSE PRACTITIONER

## 2020-03-25 PROCEDURE — 99214 PR OFFICE/OUTPT VISIT, EST, LEVL IV, 30-39 MIN: ICD-10-PCS | Mod: S$GLB,,, | Performed by: NURSE PRACTITIONER

## 2020-03-25 RX ORDER — OXYCODONE AND ACETAMINOPHEN 7.5; 325 MG/1; MG/1
1 TABLET ORAL EVERY 8 HOURS PRN
Qty: 45 TABLET | Refills: 0 | Status: SHIPPED | OUTPATIENT
Start: 2020-03-25 | End: 2020-04-14 | Stop reason: SDUPTHER

## 2020-03-25 NOTE — PROGRESS NOTES
Subjective:      Patient ID: Jaime Motta is a 54 y.o. male.    Oncology History:     Malignant neoplasm of left lung stage 4    4/30/2017 Imaging Significant Findings     CT Thoracic spine shows posterior midline mass athethe T 23-L1 with soft tissue mass 3.5 cm       5/19/2017 Surgery     Neurosx at [facility] with Dr Harry Thoracic and lumbar laminetomy at T12-L1        5/22/2017 Imaging Significant Findings     MRI Thoracic Spine done for back pain showing large erosive mass in upper lumbar spine      5/24/2017 Pathology Significant Finding     Met adenoca CK7 + full path report not available from [facility]       8/10/2017 Pathology Significant Finding     Cytology LML mass adenoca well differeniated, TTF1+    EGRF/ MMR/MSi Not Detected      9/7/2017 - 3/1/2018 Chemotherapy       Carbo/Alimta cycle 1  Carbo/Alimta/Keytruda cycle 2 - 6      2/28/2019 Imaging Significant Findings     CT C/A/P stable disease as compared to 10/26/18           Chief Complaint: Lung Cancer    Jaime Motta is here for OP PEMBROLIZUMAB 200MG Q3W      Treatment Goal:   Palliative      Status:   Active      Start Date:   3/23/2018      End Date:   3/4/2020      Provider:   Faith Swain NP      Chemotherapy:   pembrolizumab (KEYTRUDA) 200 mg in sodium chloride 0.9% 100 mL chemo infusion, 200 mg, Intravenous, Clinic/HOD 1 time, 31 of 31 cycles    Mr Motta has metastatic lung cancer and has been on Keytruda q 3 weeks since 3/23/18 and xgeva q 6 weeks.   Today the main concern is a recent ED visit on 8/30/19 for new onset lower abdominal pain and diarrhea after receiving Keytruda on 8/29/19. He had a Ct a/p which showed interval development of bowel wall thickening involving the cecum measuring up to 1.8 cm in thickness. Could not r/o ischemia vis inflammation/infection. Pt walked into clinic today to discuss results of CT, pt continues to have intermittent lowe abd pain but no diarrhea over the weekend. Will hold keytruda and prescribe high dose steroid  taper for the next 6 weeks as well as imaging after he completes steroids.    9/20/19 Visit;  Today in clinic, he has no c/o diarrhea or abdominal pain. He is currently week 5 of 6 week taper of steroids. He states he is feeling good. We will repeat Ct scan to ensure colitis resolved prior to resuming keytruda once he has completed his steroids.     10/18/19 Visit:  Pt has completed steroid taper and states feeling good. Diarrhea has resolved and recent CT scan shows DOMENIC for colitis.     11/12/19 visit:  He has no c/o after resuming keytruda due to holiday r/t episode of colitis in September. He c/o of left ear pain in which he went to ED yesterday, prescribed ear drops. Today complete cecum impaction noted to left ear. Advised to continue drops and f/u with PCP. Labs reviewed and pt to continue with treatment.     12/3/19 visit:  No c/o of diarrhea with most recent treatment but c/o knee and shoulder aches. Labs reviewed and pt is cleared for tx today. Will plan to rescan in 6-8 weeks, if scans are stable we discussed possible tx holiday. He will complete 2 years of IO in 3/2020.     12/24/19 visit:  C/o of new onset facial swelling which resolved after pt discussed with pharmacy and was told to hold Lisinopril. Discussed ACE allergy and advised to permanently dc med. BP WNL today, will continue to monitor. Pt states some mild diarrhea but well controlled with imodium. Labs reviewed and pt cleared for IO for Thursday.     1/16/20 visit:  Pt feeling ok today, states started mild cough and sinus drainage last week but denies any fever. TSH markedly increased, educated pt again on correct administration of oral med. Otherwise labs are ok for tx today. Repeat imaging in 3/20 planned.     2/12/20 visit:  No new c/o feeling good, no diarrhea noted this week, Labs reviewed and pt cleared for xgeva and cycle 30 of Keytruda. Will repeat imaging and if stable scans will offer pt tx holiday from IO. Will continue xgeva.      3/11/20visit:  Pt has missed last 2 appointment and did not do scheduled CT scans as planned. Today we discussed importance of med compliance and also discussed pain medication. Contract signed and pt is aware that if he habitually misses appointments we will not be able to provide treatment.     3/25/20   Here today to discuss recent CT scans showing continued stable disease. Pt desires tx holiday as discussed previously.      IMAGIN19 CT  C/A/P: on 19 show stable disease with no new lytic lesions. Of note, he does have lytic and asclerotic lesion of L1 And L3 as well as right iliac wing but they remain stable.  We will increase his percocet to 7.5/325mg TID for improved pain control.   CT Abdomen Pelvis  Without Contrast                                RADIOLOGY REPORT        PT NAME: AISHA GREENE      [facility] IMAGING     : 1965 CHONG 54             [address]    ACCT: [id number]                                              [facility] Rec #: [id number]                                        [id number]    [facility]             Procedure: CT ABD   PELVIS WO/W     CONTRAST    REQUISITION #: [id number]      REPORT #: [id number]           DATE OF EXAM: 20    TIME OF EXAM:        CT ABD   PELVIS WO/W CONTRAST    CMS MANDATED QUALITY DATA CT RADIATION 436    *All CT scans at this facility uses dose modulation and/or weight-based     dosing when appropriate to reduce radiation dose to as low as reasonably     achievable.            CLINICAL HISTORY:    Lung cancer with bone metastasis        TECHNIQUE:    Acquisition: Contiguous scan slices were obtained from the top of the     hemidiaphragm through the pelvis.    Reconstructions: Axial, coronal and sagittal reconstructions.         Contrast:    IV : 100 cc of Isovue 300    Oral :  None administered.    Phases: Noncontrasted and portal venous phase images.    Limitations: Evaluation of the  gastrointestinal tract is limited by the lack    of oral contrast.    Estimated radiation dose: 26.7 mSv.        COMPARISON:    October 10, 2019        FINDINGS:    Lower chest: Please see CT of the chest report.        Abdomen:    Liver:    1. Fatty infiltration of liver without focal mass is present.        Spleen: Normal.        Gallbladder and biliary tree: Normal.        Pancreas: Normal.        Adrenal glands: Normal.        Urinary tract:    Kidneys: Normal.    Pelvocalyceal systems: Normal. No hydronephrosis.    Ureters: Normal. No hydroureter        Retroperitoneum: Normal.        Mesentery and gastrointestinal tract:    1. Mild thickening of the wall of the sigmoid colon is seen. There is also     mild thickening of the wall of the terminal ileum. This could represent     areas of inflammation.        Pelvis:    Urinary bladder: Normal.        Inguinal regions: Normal.        Vasculature: Normal.        Osseous structures:    1. Sclerotic areas remain present in both L1 and L3. These appear stable.        Abdominal wall: Normal.        Additional findings: None seen.        IMPRESSION:        1.  Mild thickening of the wall of the sigmoid colon and terminal ileum     which could represent inflammatory changes in these areas.        2.  Stable appearing sclerotic areas in L1 and L3.        3.  Fatty infiltration of liver.                DICTATING PHYSICIAN:  OLIMPIA OTERO MD                   Date Dictated: 03/18/20 0911        Signed By:  OLIMPIA OTERO MD <Electronically signed by OLIMPIA OTERO MD in OV>    Date Signed:  03/18/20 0914     CC: ANURAG REDDY NP ; ANURAG REDDY NP      ADMITTING PHYSICIAN:                                                                                                     ORDERING PHY: ANURAG REDDY NP                                                                                                                                                          ATTENDING  PHY: ANURAG REDDY NP    Patient Status:  REG CLI    Admit Service Date: 20       CT Chest With Contrast                                RADIOLOGY REPORT        PT NAME: AISHA GREENE      [facility] IMAG[facility]     : 1965 M 54             [address]    ACCT: [id number]                                              [place]    [facility] Rec #: [id number]                                        [id number]    [facility]             Procedure: CHEST THORAX W CONT    REQUISITION #: [id number]      REPORT #: [id number]           DATE OF EXAM: 20    TIME OF EXAM: 915       CHEST THORAX W CONT    CMS MANDATED QUALITY DATA CT RADIATION 436    *All CT scans at this facility uses dose modulation and/or weight-based     dosing when appropriate to reduce radiation dose to as low as reasonably     achievable.        Clinical history:    Lung cancer        Technique:    Acquisition: Contiguous scan slices were obtained from the apex of the lungs    through the diaphragms.    Reconstructions: Axial, coronal and sagittal. reconstructions of the data      set were obtained.    Contrast:    IV: 100 cc of Isovue 300 intravenously administered.    Other: None administered.    Phases: Mixed arterial and venous phase images.    Limitations: No technical limitations noted.    Estimated radiation dose: 26.7 mSv.        Comparison:    2019        Findings:    Lungs, pleura and large airways:    1. There is atelectasis remain present in the lingula region.        Heart: Normal.        Systemic vasculature: Normal.        Pulmonary vasculature: Normal.        Mediastinal and hilar structures: Normal.        Chest wall, axilla and lower neck: Normal.        Upper abdomen: Please see the CT the abdomen report        Osseous structures:    1. Mixed lytic and sclerotic area remains present in L1 associated with a      laminectomy defect. This appears stable.        Additional  findings: None seen.        Impression        1.  Stable appearance.                DICTATING PHYSICIAN:  OLIMPIA OTERO MD                   Date Dictated: 03/18/20 0908        Signed By:  OLIMPIA OTERO MD <Electronically signed by OLIMPIA OTERO MD in OV>    Date Signed:  03/18/20 0911     CC: ANURAG REDDY NP ; ANURAG REDDY NP      ADMITTING PHYSICIAN:                                                                                                     ORDERING PHY: ANURAG REDDY NP                                                                                                                                                          ATTENDING PHY: ANURAG REDDY NP    Patient Status:  REG CLI    Admit Service Date: 03/18/20             Past Medical History:   Diagnosis Date    Bone cancer     Cancer     Hypertension 12/24/2019    Lung cancer     Thyroid disease      Family History   Problem Relation Age of Onset    Bladder Cancer Mother     Cancer Father     Rectal cancer Brother     Lung cancer Brother     Pancreatic cancer Other     Esophageal cancer Other      Social History     Socioeconomic History    Marital status: Single     Spouse name: Not on file    Number of children: Not on file    Years of education: Not on file    Highest education level: Not on file   Occupational History    Not on file   Social Needs    Financial resource strain: Not on file    Food insecurity:     Worry: Not on file     Inability: Not on file    Transportation needs:     Medical: Not on file     Non-medical: Not on file   Tobacco Use    Smoking status: Current Every Day Smoker     Packs/day: 0.25     Years: 30.00     Pack years: 7.50    Smokeless tobacco: Never Used   Substance and Sexual Activity    Alcohol use: Yes     Alcohol/week: 3.0 standard drinks     Types: 3 Cans of beer per week     Comment: occasionally    Drug use: No    Sexual activity: Not on file   Lifestyle    Physical  activity:     Days per week: Not on file     Minutes per session: Not on file    Stress: Not on file   Relationships    Social connections:     Talks on phone: Not on file     Gets together: Not on file     Attends Shinto service: Not on file     Active member of club or organization: Not on file     Attends meetings of clubs or organizations: Not on file     Relationship status: Not on file   Other Topics Concern    Not on file   Social History Narrative    Not on file     Past Surgical History:   Procedure Laterality Date    BACK SURGERY      FINGER SURGERY      HERNIA REPAIR      LUNG BIOPSY      PORTACATH PLACEMENT             Review of systems:  Review of Systems   Constitutional: Positive for activity change. Negative for chills, fever and unexpected weight change.   HENT: Negative for dental problem, mouth sores, sore throat and trouble swallowing.    Eyes: Negative for visual disturbance.   Respiratory: Negative for cough, chest tightness and shortness of breath.    Cardiovascular: Negative for chest pain, palpitations and leg swelling.   Gastrointestinal: Positive for diarrhea. Negative for abdominal distention, blood in stool, constipation, nausea, rectal pain and vomiting.   Genitourinary: Negative for dysuria and hematuria.   Musculoskeletal: Positive for arthralgias, back pain, gait problem (left hip pain and weakness ) and neck pain (pt c/o pain to his right shoulder and neck area; reports radiates down right arm; reports he has been trying to cut back on painting to see if this alleviates pain). Negative for joint swelling and myalgias.   Skin: Negative for pallor and rash.   Neurological: Negative for dizziness, syncope, weakness, light-headedness, numbness and headaches.   Hematological: Negative for adenopathy. Does not bruise/bleed easily.   Psychiatric/Behavioral: Negative for agitation and suicidal ideas.       Objective:     Physical Exam   Constitutional: He is oriented to person,  place, and time. He appears well-developed and well-nourished.   Neck: Normal range of motion.   Cardiovascular: Normal rate and regular rhythm.   Pulmonary/Chest: Effort normal and breath sounds normal.   Musculoskeletal: He exhibits tenderness.        Right shoulder: He exhibits decreased range of motion and tenderness.   Neurological: He is alert and oriented to person, place, and time.   Psychiatric: He has a normal mood and affect.     Vitals:    03/25/20 0956   BP: (!) 142/94   Pulse: 81   Resp: 14   Body surface area is 1.84 meters squared.    Labs:  2/12/20 labs reviewed    Assessment:      1. Metastatic cancer to bone    2. Cancer related pain    3. Malignant neoplasm of left lung stage 4           Plan:   Metastatic cancer to bone      1.Discussed recent CT scan showing continued stable disease. Discussed tx holiday as pt has completed close to 2 years of IO and has had stable CT scans over the last several months.  2. PT is agreeable to tx holiday but is aware that if cancer becomes active will resume IO at that time  3. Pt requesting pain med refill  4. RTC in 6 weeks with labs.     Faith Swain, ANP

## 2020-04-14 ENCOUNTER — OFFICE VISIT (OUTPATIENT)
Dept: HEMATOLOGY/ONCOLOGY | Facility: CLINIC | Age: 55
End: 2020-04-14
Payer: MEDICAID

## 2020-04-14 VITALS — BODY MASS INDEX: 26.2 KG/M2 | HEIGHT: 66 IN | WEIGHT: 163 LBS

## 2020-04-14 DIAGNOSIS — G89.3 CANCER RELATED PAIN: ICD-10-CM

## 2020-04-14 DIAGNOSIS — E03.9 HYPOTHYROIDISM, UNSPECIFIED TYPE: ICD-10-CM

## 2020-04-14 DIAGNOSIS — C34.92 MALIGNANT NEOPLASM OF LEFT LUNG STAGE 4: ICD-10-CM

## 2020-04-14 DIAGNOSIS — C79.51 METASTATIC CANCER TO BONE: Primary | ICD-10-CM

## 2020-04-14 PROCEDURE — 99213 PR OFFICE/OUTPT VISIT, EST, LEVL III, 20-29 MIN: ICD-10-PCS | Mod: 95,,, | Performed by: NURSE PRACTITIONER

## 2020-04-14 PROCEDURE — 99213 OFFICE O/P EST LOW 20 MIN: CPT | Mod: 95,,, | Performed by: NURSE PRACTITIONER

## 2020-04-14 RX ORDER — OXYCODONE AND ACETAMINOPHEN 7.5; 325 MG/1; MG/1
1 TABLET ORAL EVERY 8 HOURS PRN
Qty: 45 TABLET | Refills: 0 | Status: SHIPPED | OUTPATIENT
Start: 2020-04-14 | End: 2020-07-01 | Stop reason: SDUPTHER

## 2020-04-14 RX ORDER — LEVOTHYROXINE SODIUM 200 UG/1
200 TABLET ORAL DAILY
Qty: 30 TABLET | Refills: 11 | Status: SHIPPED | OUTPATIENT
Start: 2020-04-14 | End: 2020-11-17 | Stop reason: SDUPTHER

## 2020-04-14 NOTE — PROGRESS NOTES
Subjective:      Patient ID: Jaime Motta is a 54 y.o. male.    Oncology History:     Malignant neoplasm of left lung stage 4    4/30/2017 Imaging Significant Findings     CT Thoracic spine shows posterior midline mass athethe T 23-L1 with soft tissue mass 3.5 cm       5/19/2017 Surgery     Neurosx at [facility] with Dr Harry Thoracic and lumbar laminetomy at T12-L1        5/22/2017 Imaging Significant Findings     MRI Thoracic Spine done for back pain showing large erosive mass in upper lumbar spine      5/24/2017 Pathology Significant Finding     Met adenoca CK7 + full path report not available from [facility]       8/10/2017 Pathology Significant Finding     Cytology LML mass adenoca well differeniated, TTF1+    EGRF/ MMR/MSi Not Detected      9/7/2017 - 3/1/2018 Chemotherapy       Carbo/Alimta cycle 1  Carbo/Alimta/Keytruda cycle 2 - 6      2/28/2019 Imaging Significant Findings     CT C/A/P stable disease as compared to 10/26/18           Chief Complaint: pain medication refill    Jaime Motta is here for OP PEMBROLIZUMAB 200MG Q3W      Treatment Goal:   Palliative      Status:   Active      Start Date:   3/23/2018      End Date:   3/4/2020      Provider:   Faith Swain NP      Chemotherapy:   pembrolizumab (KEYTRUDA) 200 mg in sodium chloride 0.9% 100 mL chemo infusion, 200 mg, Intravenous, Clinic/HOD 1 time, 31 of 31 cycles    Mr Motta has metastatic lung cancer and has been on Keytruda q 3 weeks since 3/23/18 and xgeva q 6 weeks.   Today the main concern is a recent ED visit on 8/30/19 for new onset lower abdominal pain and diarrhea after receiving Keytruda on 8/29/19. He had a Ct a/p which showed interval development of bowel wall thickening involving the cecum measuring up to 1.8 cm in thickness. Could not r/o ischemia vis inflammation/infection. Pt walked into clinic today to discuss results of CT, pt continues to have intermittent lowe abd pain but no diarrhea over the weekend. Will hold keytruda and prescribe high  dose steroid taper for the next 6 weeks as well as imaging after he completes steroids.    9/20/19 Visit;  Today in clinic, he has no c/o diarrhea or abdominal pain. He is currently week 5 of 6 week taper of steroids. He states he is feeling good. We will repeat Ct scan to ensure colitis resolved prior to resuming keytruda once he has completed his steroids.     10/18/19 Visit:  Pt has completed steroid taper and states feeling good. Diarrhea has resolved and recent CT scan shows DOMENIC for colitis.     11/12/19 visit:  He has no c/o after resuming keytruda due to holiday r/t episode of colitis in September. He c/o of left ear pain in which he went to ED yesterday, prescribed ear drops. Today complete cecum impaction noted to left ear. Advised to continue drops and f/u with PCP. Labs reviewed and pt to continue with treatment.     12/3/19 visit:  No c/o of diarrhea with most recent treatment but c/o knee and shoulder aches. Labs reviewed and pt is cleared for tx today. Will plan to rescan in 6-8 weeks, if scans are stable we discussed possible tx holiday. He will complete 2 years of IO in 3/2020.     12/24/19 visit:  C/o of new onset facial swelling which resolved after pt discussed with pharmacy and was told to hold Lisinopril. Discussed ACE allergy and advised to permanently dc med. BP WNL today, will continue to monitor. Pt states some mild diarrhea but well controlled with imodium. Labs reviewed and pt cleared for IO for Thursday.     1/16/20 visit:  Pt feeling ok today, states started mild cough and sinus drainage last week but denies any fever. TSH markedly increased, educated pt again on correct administration of oral med. Otherwise labs are ok for tx today. Repeat imaging in 3/20 planned.     2/12/20 visit:  No new c/o feeling good, no diarrhea noted this week, Labs reviewed and pt cleared for xgeva and cycle 30 of Keytruda. Will repeat imaging and if stable scans will offer pt tx holiday from IO. Will continue  xgeva.     3/11/20visit:  Pt has missed last 2 appointment and did not do scheduled CT scans as planned. Today we discussed importance of med compliance and also discussed pain medication. Contract signed and pt is aware that if he habitually misses appointments we will not be able to provide treatment.     3/25/20   Here today to discuss recent CT scans showing continued stable disease. Pt desires tx holiday as discussed previously.      IMAGIN19 CT  C/A/P: on 19 show stable disease with no new lytic lesions. Of note, he does have lytic and asclerotic lesion of L1 And L3 as well as right iliac wing but they remain stable.  We will increase his percocet to 7.5/325mg TID for improved pain control.   CT Abdomen Pelvis  Without Contrast                                RADIOLOGY REPORT        PT NAME: AISHA GREENE      [facility]     : 1965 M 54             [address]    ACCT: [id number]                                              [facility] Rec #: [id number]                                        [id number]    [facility]             Procedure: CT ABD   PELVIS WO/W     CONTRAST    REQUISITION #: [id number]      REPORT #: [id number]           DATE OF EXAM: 20    TIME OF EXAM:        CT ABD   PELVIS WO/W CONTRAST    CMS MANDATED QUALITY DATA CT RADIATION 436    *All CT scans at this facility uses dose modulation and/or weight-based     dosing when appropriate to reduce radiation dose to as low as reasonably     achievable.            CLINICAL HISTORY:    Lung cancer with bone metastasis        TECHNIQUE:    Acquisition: Contiguous scan slices were obtained from the top of the     hemidiaphragm through the pelvis.    Reconstructions: Axial, coronal and sagittal reconstructions.         Contrast:    IV : 100 cc of Isovue 300    Oral :  None administered.    Phases: Noncontrasted and portal venous phase images.    Limitations: Evaluation  of the gastrointestinal tract is limited by the lack    of oral contrast.    Estimated radiation dose: 26.7 mSv.        COMPARISON:    October 10, 2019        FINDINGS:    Lower chest: Please see CT of the chest report.        Abdomen:    Liver:    1. Fatty infiltration of liver without focal mass is present.        Spleen: Normal.        Gallbladder and biliary tree: Normal.        Pancreas: Normal.        Adrenal glands: Normal.        Urinary tract:    Kidneys: Normal.    Pelvocalyceal systems: Normal. No hydronephrosis.    Ureters: Normal. No hydroureter        Retroperitoneum: Normal.        Mesentery and gastrointestinal tract:    1. Mild thickening of the wall of the sigmoid colon is seen. There is also     mild thickening of the wall of the terminal ileum. This could represent     areas of inflammation.        Pelvis:    Urinary bladder: Normal.        Inguinal regions: Normal.        Vasculature: Normal.        Osseous structures:    1. Sclerotic areas remain present in both L1 and L3. These appear stable.        Abdominal wall: Normal.        Additional findings: None seen.        IMPRESSION:        1.  Mild thickening of the wall of the sigmoid colon and terminal ileum     which could represent inflammatory changes in these areas.        2.  Stable appearing sclerotic areas in L1 and L3.        3.  Fatty infiltration of liver.                DICTATING PHYSICIAN:  OL[place] OTERO MD                   Date Dictated: 03/18/20 0911        Signed By:  OLIMPIA OTERO MD <Electronically signed by OLIMPIA OTERO MD in OV>    Date Signed:  03/18/20 0914     CC: ANURAG REDDY NP ; ANURAG REDDY NP      ADMITTING PHYSICIAN:                                                                                                     ORDERING PHY: ANURAG REDDY NP                                                                                                                                                           ATTENDING PHY: ANURAG REDDY NP    Patient Status:  REG CLI    Admit Service Date: 20       CT Chest With Contrast                                RADIOLOGY REPORT        PT NAME: AISHA GREENE      [facility] IM[facility]     : 1965 M 54             [address]    ACCT: [id number]                                              [place]    [facility] Rec #: [id number]                                        [id number]    [facility]             Procedure: CHEST THORAX W CONT    REQUISITION #: [id number]      REPORT #: [id number]           DATE OF EXAM: 20    TIME OF EXAM: 915       CHEST THORAX W CONT    CMS MANDATED QUALITY DATA CT RADIATION 436    *All CT scans at this facility uses dose modulation and/or weight-based     dosing when appropriate to reduce radiation dose to as low as reasonably     achievable.        Clinical history:    Lung cancer        Technique:    Acquisition: Contiguous scan slices were obtained from the apex of the lungs    through the diaphragms.    Reconstructions: Axial, coronal and sagittal. reconstructions of the data      set were obtained.    Contrast:    IV: 100 cc of Isovue 300 intravenously administered.    Other: None administered.    Phases: Mixed arterial and venous phase images.    Limitations: No technical limitations noted.    Estimated radiation dose: 26.7 mSv.        Comparison:    2019        Findings:    Lungs, pleura and large airways:    1. There is atelectasis remain present in the lingula region.        Heart: Normal.        Systemic vasculature: Normal.        Pulmonary vasculature: Normal.        Mediastinal and hilar structures: Normal.        Chest wall, axilla and lower neck: Normal.        Upper abdomen: Please see the CT the abdomen report        Osseous structures:    1. Mixed lytic and sclerotic area remains present in L1 associated with a      laminectomy defect. This appears stable.         Additional findings: None seen.        Impression        1.  Stable appearance.                DICTATING PHYSICIAN:  OLIMPIA OTERO MD                   Date Dictated: 03/18/20 0908        Signed By:  OLIMPIA OTERO MD <Electronically signed by OLIMPIA OTERO MD in OV>    Date Signed:  03/18/20 0911     CC: ANURAG REDDY NP ; ANURAG REDDY NP      ADMITTING PHYSICIAN:                                                                                                     ORDERING PHY: ANURAG REDDY NP                                                                                                                                                          ATTENDING PHY: ANURAG REDDY NP    Patient Status:  REG CLI    Admit Service Date: 03/18/20             Past Medical History:   Diagnosis Date    Bone cancer     Cancer     Hypertension 12/24/2019    Lung cancer     Thyroid disease      Family History   Problem Relation Age of Onset    Bladder Cancer Mother     Cancer Father     Rectal cancer Brother     Lung cancer Brother     Pancreatic cancer Other     Esophageal cancer Other      Social History     Socioeconomic History    Marital status: Single     Spouse name: Not on file    Number of children: Not on file    Years of education: Not on file    Highest education level: Not on file   Occupational History    Not on file   Social Needs    Financial resource strain: Not on file    Food insecurity:     Worry: Not on file     Inability: Not on file    Transportation needs:     Medical: Not on file     Non-medical: Not on file   Tobacco Use    Smoking status: Current Every Day Smoker     Packs/day: 0.25     Years: 30.00     Pack years: 7.50    Smokeless tobacco: Never Used   Substance and Sexual Activity    Alcohol use: Yes     Alcohol/week: 3.0 standard drinks     Types: 3 Cans of beer per week     Comment: occasionally    Drug use: No    Sexual activity: Not on file   Lifestyle     Physical activity:     Days per week: Not on file     Minutes per session: Not on file    Stress: Not on file   Relationships    Social connections:     Talks on phone: Not on file     Gets together: Not on file     Attends Sabianist service: Not on file     Active member of club or organization: Not on file     Attends meetings of clubs or organizations: Not on file     Relationship status: Not on file   Other Topics Concern    Not on file   Social History Narrative    Not on file     Past Surgical History:   Procedure Laterality Date    BACK SURGERY      FINGER SURGERY      HERNIA REPAIR      LUNG BIOPSY      PORTACATH PLACEMENT             Review of systems:  Review of Systems   Constitutional: Positive for activity change. Negative for chills, fever and unexpected weight change.   HENT: Negative for dental problem, mouth sores, sore throat and trouble swallowing.    Eyes: Negative for visual disturbance.   Respiratory: Negative for cough, chest tightness and shortness of breath.    Cardiovascular: Negative for chest pain, palpitations and leg swelling.   Gastrointestinal: Positive for diarrhea. Negative for abdominal distention, blood in stool, constipation, nausea, rectal pain and vomiting.   Genitourinary: Negative for dysuria and hematuria.   Musculoskeletal: Positive for arthralgias, back pain, gait problem (left hip pain and weakness ) and neck pain (pt c/o pain to his right shoulder and neck area; reports radiates down right arm; reports he has been trying to cut back on painting to see if this alleviates pain). Negative for joint swelling and myalgias.   Skin: Negative for pallor and rash.   Neurological: Negative for dizziness, syncope, weakness, light-headedness, numbness and headaches.   Hematological: Negative for adenopathy. Does not bruise/bleed easily.   Psychiatric/Behavioral: Negative for agitation and suicidal ideas.       Objective:     Physical Exam   Constitutional: He is oriented to  person, place, and time. He appears well-developed and well-nourished.   Neck: Normal range of motion.   Cardiovascular: Normal rate and regular rhythm.   Pulmonary/Chest: Effort normal and breath sounds normal.   Musculoskeletal: He exhibits tenderness.        Right shoulder: He exhibits decreased range of motion and tenderness.   Neurological: He is alert and oriented to person, place, and time.   Psychiatric: He has a normal mood and affect.     There were no vitals filed for this visit.Body surface area is 1.86 meters squared.    Labs:  2/12/20 labs reviewed    Assessment:      1. Metastatic cancer to bone    2. Malignant neoplasm of left lung stage 4    3. Cancer related pain           Plan:   Metastatic cancer to bone    Malignant neoplasm of left lung stage 4    Cancer related pain      1.Discussed recent CT scan showing continued stable disease. Discussed tx holiday as pt has completed close to 2 years of IO and has had stable CT scans over the last several months.  2. PT is agreeable to tx holiday but is aware that if cancer becomes active will resume IO at that time  3. Pt requesting pain med refill, pt is taking 2-3 tabs per day, discussed decreasing dosage frequency and amt as we begin to wean down to lowest tolerable dose.   4. RTC in 6 weeks with labs.   The patient location is: home  The chief complaint leading to consultation is: pain medication refill  Visit type: audio only  Total time spent with patient: 15 min  Each patient to whom he or she provides medical services by telemedicine is:  (1) informed of the relationship between the physician and patient and the respective role of any other health care provider with respect to management of the patient; and (2) notified that he or she may decline to receive medical services by telemedicine and may withdraw from such care at any time.    Notes: see above   Faith Swain, ANP

## 2020-06-30 ENCOUNTER — OFFICE VISIT (OUTPATIENT)
Dept: HEMATOLOGY/ONCOLOGY | Facility: CLINIC | Age: 55
End: 2020-06-30
Payer: MEDICAID

## 2020-06-30 VITALS
TEMPERATURE: 98 F | RESPIRATION RATE: 18 BRPM | WEIGHT: 154.19 LBS | DIASTOLIC BLOOD PRESSURE: 87 MMHG | BODY MASS INDEX: 24.78 KG/M2 | SYSTOLIC BLOOD PRESSURE: 121 MMHG | HEIGHT: 66 IN | OXYGEN SATURATION: 96 % | HEART RATE: 91 BPM

## 2020-06-30 DIAGNOSIS — C34.92 MALIGNANT NEOPLASM OF LEFT LUNG STAGE 4: Primary | ICD-10-CM

## 2020-06-30 DIAGNOSIS — D72.10 EOSINOPHILIA: ICD-10-CM

## 2020-06-30 DIAGNOSIS — R19.7 DIARRHEA, UNSPECIFIED TYPE: ICD-10-CM

## 2020-06-30 DIAGNOSIS — C79.51 METASTATIC CANCER TO BONE: ICD-10-CM

## 2020-06-30 PROCEDURE — 99214 PR OFFICE/OUTPT VISIT, EST, LEVL IV, 30-39 MIN: ICD-10-PCS | Mod: S$GLB,,, | Performed by: NURSE PRACTITIONER

## 2020-06-30 PROCEDURE — 99214 OFFICE O/P EST MOD 30 MIN: CPT | Mod: S$GLB,,, | Performed by: NURSE PRACTITIONER

## 2020-06-30 NOTE — PROGRESS NOTES
Subjective:      Patient ID: Jaime Motta is a 54 y.o. male.    Oncology History:  Oncology History   Malignant neoplasm of left lung stage 4   4/30/2017 Imaging Significant Findings    CT Thoracic spine shows posterior midline mass athethe T 23-L1 with soft tissue mass 3.5 cm      5/19/2017 Surgery    Neurosx at [facility] with Dr Harry Thoracic and lumbar laminetomy at T12-L1       5/22/2017 Imaging Significant Findings    MRI Thoracic Spine done for back pain showing large erosive mass in upper lumbar spine     5/24/2017 Pathology Significant Finding    Met adenoca CK7 + full path report not available from [facility]      8/10/2017 Pathology Significant Finding    Cytology LML mass adenoca well differeniated, TTF1+    EGRF/ MMR/MSi Not Detected     9/7/2017 - 3/1/2018 Chemotherapy      Carbo/Alimta cycle 1  Carbo/Alimta/Keytruda cycle 2 - 6     2/28/2019 Imaging Significant Findings    CT C/A/P stable disease as compared to 10/26/18           Chief Complaint: Metastatic cancer to bone (pt. stated diarrhea started about a week ago)    Jaime Motta is here for OP PEMBROLIZUMAB 200MG Q3W      Treatment Goal:   Palliative      Status:   Active      Start Date:   3/23/2018      End Date:   3/4/2020      Provider:   Faith Swain NP      Chemotherapy:   pembrolizumab (KEYTRUDA) 200 mg in sodium chloride 0.9% 100 mL chemo infusion, 200 mg, Intravenous, Clinic/HOD 1 time, 31 of 31 cycles    Mr Motta has metastatic lung cancer and has been on Keytruda q 3 weeks since 3/23/18 and xgeva q 6 weeks.   Today the main concern is a recent ED visit on 8/30/19 for new onset lower abdominal pain and diarrhea after receiving Keytruda on 8/29/19. He had a Ct a/p which showed interval development of bowel wall thickening involving the cecum measuring up to 1.8 cm in thickness. Could not r/o ischemia vis inflammation/infection. Pt walked into clinic today to discuss results of CT, pt continues to have intermittent lowe abd pain but no diarrhea over  the weekend. Will hold keytruda and prescribe high dose steroid taper for the next 6 weeks as well as imaging after he completes steroids.    9/20/19 Visit;  Today in clinic, he has no c/o diarrhea or abdominal pain. He is currently week 5 of 6 week taper of steroids. He states he is feeling good. We will repeat Ct scan to ensure colitis resolved prior to resuming keytruda once he has completed his steroids.     10/18/19 Visit:  Pt has completed steroid taper and states feeling good. Diarrhea has resolved and recent CT scan shows DOMENIC for colitis.     11/12/19 visit:  He has no c/o after resuming keytruda due to holiday r/t episode of colitis in September. He c/o of left ear pain in which he went to ED yesterday, prescribed ear drops. Today complete cecum impaction noted to left ear. Advised to continue drops and f/u with PCP. Labs reviewed and pt to continue with treatment.     12/3/19 visit:  No c/o of diarrhea with most recent treatment but c/o knee and shoulder aches. Labs reviewed and pt is cleared for tx today. Will plan to rescan in 6-8 weeks, if scans are stable we discussed possible tx holiday. He will complete 2 years of IO in 3/2020.     12/24/19 visit:  C/o of new onset facial swelling which resolved after pt discussed with pharmacy and was told to hold Lisinopril. Discussed ACE allergy and advised to permanently dc med. BP WNL today, will continue to monitor. Pt states some mild diarrhea but well controlled with imodium. Labs reviewed and pt cleared for IO for Thursday.     1/16/20 visit:  Pt feeling ok today, states started mild cough and sinus drainage last week but denies any fever. TSH markedly increased, educated pt again on correct administration of oral med. Otherwise labs are ok for tx today. Repeat imaging in 3/20 planned.     2/12/20 visit:  No new c/o feeling good, no diarrhea noted this week, Labs reviewed and pt cleared for xgeva and cycle 30 of Keytruda. Will repeat imaging and if stable  scans will offer pt tx holiday from IO. Will continue xgeva.     3/11/20visit:  Pt has missed last 2 appointment and did not do scheduled CT scans as planned. Today we discussed importance of med compliance and also discussed pain medication. Contract signed and pt is aware that if he habitually misses appointments we will not be able to provide treatment.     3/25/20   Here today to discuss recent CT scans showing continued stable disease. Pt desires tx holiday as discussed previously.     2020  Pt here today with continue c.o of diarrhea, stooling 6-8 times a day even though he has been on a tx holiday for 3 months. He states he is not taking anything OTC for the diarrhea. Also c.o right shoulder pain. Will setup c-scope and repeat imaging.      IMAGIN19 CT  C/A/P: on 19 show stable disease with no new lytic lesions. Of note, he does have lytic and asclerotic lesion of L1 And L3 as well as right iliac wing but they remain stable.  We will increase his percocet to 7.5/325mg TID for improved pain control.   CT Abdomen Pelvis  Without Contrast                                RADIOLOGY REPORT        PT NAME: AISHA GREENE      [facility] [place] [facility]     : 1965  54             [address]    ACCT: [id number]                                              [facility] Rec #: [id number]                                        [id number]    [facility]             Procedure: CT ABD   PELVIS WO/W     CONTRAST    REQUISITION #: [id number]      REPORT #: [id number]           DATE OF EXAM: 20    TIME OF EXAM:        CT ABD   PELVIS WO/W CONTRAST    CMS MANDATED QUALITY DATA CT RADIATION 436    *All CT scans at this facility uses dose modulation and/or weight-based     dosing when appropriate to reduce radiation dose to as low as reasonably     achievable.            CLINICAL HISTORY:    Lung cancer with bone metastasis        TECHNIQUE:    Acquisition:  Contiguous scan slices were obtained from the top of the     hemidiaphragm through the pelvis.    Reconstructions: Axial, coronal and sagittal reconstructions.         Contrast:    IV : 100 cc of Isovue 300    Oral :  None administered.    Phases: Noncontrasted and portal venous phase images.    Limitations: Evaluation of the gastrointestinal tract is limited by the lack    of oral contrast.    Estimated radiation dose: 26.7 mSv.        COMPARISON:    October 10, 2019        FINDINGS:    Lower chest: Please see CT of the chest report.        Abdomen:    Liver:    1. Fatty infiltration of liver without focal mass is present.        Spleen: Normal.        Gallbladder and biliary tree: Normal.        Pancreas: Normal.        Adrenal glands: Normal.        Urinary tract:    Kidneys: Normal.    Pelvocalyceal systems: Normal. No hydronephrosis.    Ureters: Normal. No hydroureter        Retroperitoneum: Normal.        Mesentery and gastrointestinal tract:    1. Mild thickening of the wall of the sigmoid colon is seen. There is also     mild thickening of the wall of the terminal ileum. This could represent     areas of inflammation.        Pelvis:    Urinary bladder: Normal.        Inguinal regions: Normal.        Vasculature: Normal.        Osseous structures:    1. Sclerotic areas remain present in both L1 and L3. These appear stable.        Abdominal wall: Normal.        Additional findings: None seen.        IMPRESSION:        1.  Mild thickening of the wall of the sigmoid colon and terminal ileum     which could represent inflammatory changes in these areas.        2.  Stable appearing sclerotic areas in L1 and L3.        3.  Fatty infiltration of liver.                DICTATING PHYSICIAN:  OLIMPIA OTERO MD                   Date Dictated: 03/18/20 0911        Signed By:  OLIMPIA OTERO MD <Electronically signed by OLIMPIA OTERO MD in OV>    Date Signed:  03/18/20 0914     CC: ANURAG REDDY NP ; ANURAG MARX  SIMON REDDY      ADMITTING PHYSICIAN:                                                                                                     ORDERING PHY: ANURAG REDDY NP                                                                                                                                                          ATTENDING PHY: ANURAG REDDY NP    Patient Status:  REG CLI    Admit Service Date: 20       CT Chest With Contrast                                RADIOLOGY REPORT        PT NAME: AISHA GREENE      [place] [facility]     : 1965 CHONG 54             [address]    ACCT: [id number]                                              [facility] [facility] Rec #: [id number]                                        [id number]    [facility]             Procedure: CHEST THORAX W CONT    REQUISITION #: [id number]      REPORT #: [id number]           DATE OF EXAM: 20    TIME OF EXAM: 915       CHEST THORAX W CONT    CMS MANDATED QUALITY DATA CT RADIATION 436    *All CT scans at this facility uses dose modulation and/or weight-based     dosing when appropriate to reduce radiation dose to as low as reasonably     achievable.        Clinical history:    Lung cancer        Technique:    Acquisition: Contiguous scan slices were obtained from the apex of the lungs    through the diaphragms.    Reconstructions: Axial, coronal and sagittal. reconstructions of the data      set were obtained.    Contrast:    IV: 100 cc of Isovue 300 intravenously administered.    Other: None administered.    Phases: Mixed arterial and venous phase images.    Limitations: No technical limitations noted.    Estimated radiation dose: 26.7 mSv.        Comparison:    2019        Findings:    Lungs, pleura and large airways:    1. There is atelectasis remain present in the lingula region.        Heart: Normal.        Systemic vasculature: Normal.        Pulmonary vasculature: Normal.         Mediastinal and hilar structures: Normal.        Chest wall, axilla and lower neck: Normal.        Upper abdomen: Please see the CT the abdomen report        Osseous structures:    1. Mixed lytic and sclerotic area remains present in L1 associated with a      laminectomy defect. This appears stable.        Additional findings: None seen.        Impression        1.  Stable appearance.                DICTATING PHYSICIAN:  OLIMPIA OTERO MD                   Date Dictated: 03/18/20 0908        Signed By:  OLIMPIA OTERO MD <Electronically signed by OLIMPIA OTERO MD in OV>    Date Signed:  03/18/20 0911     CC: ANURAG REDDY NP ; ANURAG REDDY NP      ADMITTING PHYSICIAN:                                                                                                     ORDERING PHY: ANURAG REDDY NP                                                                                                                                                          ATTENDING PHY: ANURAG REDDY NP    Patient Status:  REG CLI    Admit Service Date: 03/18/20             Past Medical History:   Diagnosis Date    Bone cancer     Cancer     Hypertension 12/24/2019    Lung cancer     Thyroid disease      Family History   Problem Relation Age of Onset    Bladder Cancer Mother     Cancer Father     Rectal cancer Brother     Lung cancer Brother     Pancreatic cancer Other     Esophageal cancer Other      Social History     Socioeconomic History    Marital status: Single     Spouse name: Not on file    Number of children: Not on file    Years of education: Not on file    Highest education level: Not on file   Occupational History    Not on file   Social Needs    Financial resource strain: Not on file    Food insecurity     Worry: Not on file     Inability: Not on file    Transportation needs     Medical: Not on file     Non-medical: Not on file   Tobacco Use    Smoking status: Current Every Day Smoker      Packs/day: 0.25     Years: 30.00     Pack years: 7.50    Smokeless tobacco: Never Used   Substance and Sexual Activity    Alcohol use: Yes     Alcohol/week: 3.0 standard drinks     Types: 3 Cans of beer per week     Comment: occasionally    Drug use: No    Sexual activity: Not on file   Lifestyle    Physical activity     Days per week: Not on file     Minutes per session: Not on file    Stress: Not on file   Relationships    Social connections     Talks on phone: Not on file     Gets together: Not on file     Attends Nondenominational service: Not on file     Active member of club or organization: Not on file     Attends meetings of clubs or organizations: Not on file     Relationship status: Not on file   Other Topics Concern    Not on file   Social History Narrative    Not on file     Past Surgical History:   Procedure Laterality Date    BACK SURGERY      FINGER SURGERY      HERNIA REPAIR      LUNG BIOPSY      PORTACATH PLACEMENT             Review of systems:  Review of Systems   Constitutional: Positive for activity change. Negative for chills, fever and unexpected weight change.   HENT: Negative for dental problem, mouth sores, sore throat and trouble swallowing.    Eyes: Negative for visual disturbance.   Respiratory: Negative for cough, chest tightness and shortness of breath.    Cardiovascular: Negative for chest pain, palpitations and leg swelling.   Gastrointestinal: Positive for diarrhea. Negative for abdominal distention, blood in stool, constipation, nausea, rectal pain and vomiting.   Genitourinary: Negative for dysuria and hematuria.   Musculoskeletal: Positive for arthralgias, back pain, gait problem (left hip pain and weakness ) and neck pain (pt c/o pain to his right shoulder and neck area; reports radiates down right arm; reports he has been trying to cut back on painting to see if this alleviates pain). Negative for joint swelling and myalgias.   Skin: Negative for pallor and rash.    Neurological: Negative for dizziness, syncope, weakness, light-headedness, numbness and headaches.   Hematological: Negative for adenopathy. Does not bruise/bleed easily.   Psychiatric/Behavioral: Negative for agitation and suicidal ideas.       Objective:     Physical Exam  Constitutional:       Appearance: He is well-developed.   Neck:      Musculoskeletal: Normal range of motion.   Cardiovascular:      Rate and Rhythm: Normal rate and regular rhythm.   Pulmonary:      Effort: Pulmonary effort is normal.      Breath sounds: Normal breath sounds.   Musculoskeletal:         General: Tenderness present.      Right shoulder: He exhibits decreased range of motion and tenderness.   Neurological:      Mental Status: He is alert and oriented to person, place, and time.       Vitals:    06/30/20 0922   BP: 121/87   Pulse: 91   Resp: 18   Temp: 97.7 °F (36.5 °C)   Body surface area is 1.8 meters squared.    Labs:  2/12/20 labs reviewed    Assessment:      1. Malignant neoplasm of left lung stage 4    2. Metastatic cancer to bone    3. Diarrhea, unspecified type           Plan:   Malignant neoplasm of left lung stage 4    Metastatic cancer to bone    Diarrhea, unspecified type  -     Ambulatory referral/consult to Gastroenterology; Future; Expected date: 07/07/2020      1.Discussed recent CT scan showing continued stable disease. Discussed tx holiday as pt has completed close to 2 years of IO and has had stable CT scans over the last several months.  2. PT is agreeable to tx holiday but is aware that if cancer becomes active will resume IO at that time  3. Pt requesting pain med refill, pt is taking 2-3 tabs per day, discussed decreasing dosage frequency and amt as we begin to wean down to lowest tolerable dose.   4. RTC in 6 weeks with labs and scans   5. C/o of continued diarrhea in-spite of tx holiday, stooling 6-8 times a day, denies any blood will refer to GI for evaluation   6. veristrat testing done     Faith Swain  ANP

## 2020-07-01 ENCOUNTER — TELEPHONE (OUTPATIENT)
Dept: HEMATOLOGY/ONCOLOGY | Facility: CLINIC | Age: 55
End: 2020-07-01

## 2020-07-01 DIAGNOSIS — C34.92 MALIGNANT NEOPLASM OF LEFT LUNG STAGE 4: ICD-10-CM

## 2020-07-01 DIAGNOSIS — C79.51 METASTATIC CANCER TO BONE: ICD-10-CM

## 2020-07-01 RX ORDER — OXYCODONE AND ACETAMINOPHEN 7.5; 325 MG/1; MG/1
1 TABLET ORAL EVERY 8 HOURS PRN
Qty: 45 TABLET | Refills: 0 | Status: SHIPPED | OUTPATIENT
Start: 2020-07-01 | End: 2020-08-11 | Stop reason: SDUPTHER

## 2020-07-01 RX ORDER — OXYCODONE AND ACETAMINOPHEN 7.5; 325 MG/1; MG/1
1 TABLET ORAL EVERY 8 HOURS PRN
Qty: 45 TABLET | Refills: 0 | Status: CANCELLED | OUTPATIENT
Start: 2020-07-01 | End: 2021-07-01

## 2020-07-01 NOTE — TELEPHONE ENCOUNTER
----- Message from Deb Palmer sent at 7/1/2020  2:36 PM CDT -----  Contact: self  Requesting call back regarding pt is at pharmacy and states no prescription has been sent over. Please call back at [phone].

## 2020-08-11 ENCOUNTER — OFFICE VISIT (OUTPATIENT)
Dept: HEMATOLOGY/ONCOLOGY | Facility: CLINIC | Age: 55
End: 2020-08-11
Payer: MEDICAID

## 2020-08-11 VITALS
DIASTOLIC BLOOD PRESSURE: 95 MMHG | WEIGHT: 157 LBS | OXYGEN SATURATION: 97 % | TEMPERATURE: 98 F | RESPIRATION RATE: 14 BRPM | SYSTOLIC BLOOD PRESSURE: 143 MMHG | HEIGHT: 66 IN | HEART RATE: 88 BPM | BODY MASS INDEX: 25.23 KG/M2

## 2020-08-11 DIAGNOSIS — C79.51 METASTATIC CANCER TO BONE: ICD-10-CM

## 2020-08-11 DIAGNOSIS — G89.3 CANCER RELATED PAIN: ICD-10-CM

## 2020-08-11 DIAGNOSIS — C34.92 MALIGNANT NEOPLASM OF LEFT LUNG STAGE 4: Primary | ICD-10-CM

## 2020-08-11 LAB — MACROCYTES BLD QL SMEAR: NORMAL

## 2020-08-11 PROCEDURE — 99214 OFFICE O/P EST MOD 30 MIN: CPT | Mod: S$GLB,,, | Performed by: NURSE PRACTITIONER

## 2020-08-11 PROCEDURE — 99214 PR OFFICE/OUTPT VISIT, EST, LEVL IV, 30-39 MIN: ICD-10-PCS | Mod: S$GLB,,, | Performed by: NURSE PRACTITIONER

## 2020-08-11 RX ORDER — OXYCODONE AND ACETAMINOPHEN 7.5; 325 MG/1; MG/1
1 TABLET ORAL EVERY 8 HOURS PRN
Qty: 45 TABLET | Refills: 0 | Status: SHIPPED | OUTPATIENT
Start: 2020-08-11 | End: 2020-11-17 | Stop reason: SDUPTHER

## 2020-08-11 NOTE — PROGRESS NOTES
Subjective:      Patient ID: Jaime Motta is a 54 y.o. male.    Oncology History:  Oncology History   Malignant neoplasm of left lung stage 4   4/30/2017 Imaging Significant Findings    CT Thoracic spine shows posterior midline mass athethe T 23-L1 with soft tissue mass 3.5 cm      5/19/2017 Surgery    [facility] with Dr Harry Thoracic and lumbar laminetomy at T12-L1       5/22/2017 Imaging Significant Findings    MRI Thoracic Spine done for back pain showing large erosive mass in upper lumbar spine     5/24/2017 Pathology Significant Finding    Met adenoca CK7 + full path report not available from [facility]      8/10/2017 Pathology Significant Finding    Cytology LML mass adenoca well differeniated, TTF1+    EGRF/ MMR/MSi Not Detected     9/7/2017 - 3/1/2018 Chemotherapy      Carbo/Alimta cycle 1  Carbo/Alimta/Keytruda cycle 2 - 6     2/28/2019 Imaging Significant Findings    CT C/A/P stable disease as compared to 10/26/18           Chief Complaint: Malignant neoplasm of left lung stage 4    Jaime Motta is here for OP PEMBROLIZUMAB 200MG Q3W      Treatment Goal:   Palliative      Status:   Active      Start Date:   3/23/2018      End Date:   3/4/2020      Provider:   Faith Swain NP      Chemotherapy:   pembrolizumab (KEYTRUDA) 200 mg in sodium chloride 0.9% 100 mL chemo infusion, 200 mg, Intravenous, Clinic/HOD 1 time, 31 of 31 cycles    Mr Motta has metastatic lung cancer and has been on Keytruda q 3 weeks since 3/23/18 and xgeva q 6 weeks.   Today the main concern is a recent ED visit on 8/30/19 for new onset lower abdominal pain and diarrhea after receiving Keytruda on 8/29/19. He had a Ct a/p which showed interval development of bowel wall thickening involving the cecum measuring up to 1.8 cm in thickness. Could not r/o ischemia vis inflammation/infection. Pt walked into clinic today to discuss results of CT, pt continues to have intermittent lowe abd pain but no diarrhea over the weekend. Will hold keytruda  and prescribe high dose steroid taper for the next 6 weeks as well as imaging after he completes steroids.    3/18/18 to 3/3/2020 Keytruda   3/3/2020 tx holiday due to colitis and DOMENIC on CT scans         CT Abdomen Pelvis  Without Contrast                                RADIOLOGY REPORT        PT NAME: AIHSA GREENE      [facility]     : 1965 CHONG 54             [address] [facility]    ACCT: [id number]                                              [facility] Rec #: [id number]                                        [id number]    [facility]             Procedure: CT ABD   PELVIS WO/W     CONTRAST    REQUISITION #: [id number]      REPORT #: [id number]           DATE OF EXAM: 20    TIME OF EXAM:        CT ABD   PELVIS WO/W CONTRAST    CMS MANDATED QUALITY DATA CT RADIATION 436    *All CT scans at this facility uses dose modulation and/or weight-based     dosing when appropriate to reduce radiation dose to as low as reasonably     achievable.            CLINICAL HISTORY:    Lung cancer with bone metastasis        TECHNIQUE:    Acquisition: Contiguous scan slices were obtained from the top of the     hemidiaphragm through the pelvis.    Reconstructions: Axial, coronal and sagittal reconstructions.         Contrast:    IV : 100 cc of Isovue 300    Oral :  None administered.    Phases: Noncontrasted and portal venous phase images.    Limitations: Evaluation of the gastrointestinal tract is limited by the lack    of oral contrast.    Estimated radiation dose: 26.7 mSv.        COMPARISON:    October 10, 2019        FINDINGS:    Lower chest: Please see CT of the chest report.        Abdomen:    Liver:    1. Fatty infiltration of liver without focal mass is present.        Spleen: Normal.        Gallbladder and biliary tree: Normal.        Pancreas: Normal.        Adrenal glands: Normal.        Urinary tract:    Kidneys: Normal.    Pelvocalyceal systems: Normal. No  hydronephrosis.    Ureters: Normal. No hydroureter        Retroperitoneum: Normal.        Mesentery and gastrointestinal tract:    1. Mild thickening of the wall of the sigmoid colon is seen. There is also     mild thickening of the wall of the terminal ileum. This could represent     areas of inflammation.        Pelvis:    Urinary bladder: Normal.        Inguinal regions: Normal.        Vasculature: Normal.        Osseous structures:    1. Sclerotic areas remain present in both L1 and L3. These appear stable.        Abdominal wall: Normal.        Additional findings: None seen.        IMPRESSION:        1.  Mild thickening of the wall of the sigmoid colon and terminal ileum     which could represent inflammatory changes in these areas.        2.  Stable appearing sclerotic areas in L1 and L3.        3.  Fatty infiltration of liver.                DICTATING PHYSICIAN:  OLIMPIA OTERO MD                   Date Dictated: 20        Signed By:  OLIMPIA OTERO MD <Electronically signed by OLIMPIA OTERO MD in OV>    Date Signed:  20     CC: ANURAG REDDY NP ; ANURAG REDDY NP      ADMITTING PHYSICIAN:                                                                                                     ORDERING PHY: ANURAG REDDY NP                                                                                                                                                          ATTENDING PHY: ANURAG REDDY NP    Patient Status:  REG CLI    Admit Service Date: 20       CT Chest With Contrast                                RADIOLOGY REPORT        PT NAME: AISHA GREENE      [facility]     : 1965 M 54             [address]    ACCT: [id number]                                              [facility] Rec #: [id number]                                        [id number]    [facility]             Procedure: CHEST  THORAX W CONT    REQUISITION #: [id number]      REPORT #: [id number]           DATE OF EXAM: 03/18/20    TIME OF EXAM: 0915       CHEST THORAX W CONT    CMS MANDATED QUALITY DATA CT RADIATION 436    *All CT scans at this facility uses dose modulation and/or weight-based     dosing when appropriate to reduce radiation dose to as low as reasonably     achievable.        Clinical history:    Lung cancer        Technique:    Acquisition: Contiguous scan slices were obtained from the apex of the lungs    through the diaphragms.    Reconstructions: Axial, coronal and sagittal. reconstructions of the data      set were obtained.    Contrast:    IV: 100 cc of Isovue 300 intravenously administered.    Other: None administered.    Phases: Mixed arterial and venous phase images.    Limitations: No technical limitations noted.    Estimated radiation dose: 26.7 mSv.        Comparison:    August 6, 2019        Findings:    Lungs, pleura and large airways:    1. There is atelectasis remain present in the lingula region.        Heart: Normal.        Systemic vasculature: Normal.        Pulmonary vasculature: Normal.        Mediastinal and hilar structures: Normal.        Chest wall, axilla and lower neck: Normal.        Upper abdomen: Please see the CT the abdomen report        Osseous structures:    1. Mixed lytic and sclerotic area remains present in L1 associated with a      laminectomy defect. This appears stable.        Additional findings: None seen.        Impression        1.  Stable appearance.                DICTATING PHYSICIAN:  OLIMPIA OTERO MD                   Date Dictated: 03/18/20 0908        Signed By:  OLIMPIA OTERO MD <Electronically signed by OLIMPIA OTERO MD in OV>    Date Signed:  03/18/20 0911     CC: ANURAG REDDY NP ; ANURAG REDDY NP      ADMITTING PHYSICIAN:                                                                                                     ORDERING PHY: ANURAG REDDY NP                                                                                                                                                           ATTENDING PHY: ANURAG REDDY NP    Patient Status:  REG CLI    Admit Service Date: 03/18/20             Past Medical History:   Diagnosis Date    Bone cancer     Cancer     Hypertension 12/24/2019    Lung cancer     Thyroid disease      Family History   Problem Relation Age of Onset    Bladder Cancer Mother     Cancer Father     Rectal cancer Brother     Lung cancer Brother     Pancreatic cancer Other     Esophageal cancer Other      Social History     Socioeconomic History    Marital status: Single     Spouse name: Not on file    Number of children: Not on file    Years of education: Not on file    Highest education level: Not on file   Occupational History    Not on file   Social Needs    Financial resource strain: Not on file    Food insecurity     Worry: Not on file     Inability: Not on file    Transportation needs     Medical: Not on file     Non-medical: Not on file   Tobacco Use    Smoking status: Current Every Day Smoker     Packs/day: 0.25     Years: 30.00     Pack years: 7.50    Smokeless tobacco: Never Used   Substance and Sexual Activity    Alcohol use: Yes     Alcohol/week: 3.0 standard drinks     Types: 3 Cans of beer per week     Comment: occasionally    Drug use: No    Sexual activity: Not on file   Lifestyle    Physical activity     Days per week: Not on file     Minutes per session: Not on file    Stress: Not on file   Relationships    Social connections     Talks on phone: Not on file     Gets together: Not on file     Attends [employer] service: Not on file     Active member of club or organization: Not on file     Attends meetings of clubs or organizations: Not on file     Relationship status: Not on file   Other Topics Concern    Not on file   Social History Narrative    Not on file     Past Surgical History:    Procedure Laterality Date    BACK SURGERY      FINGER SURGERY      HERNIA REPAIR      LUNG BIOPSY      PORTACATH PLACEMENT             Review of systems:  Review of Systems   Constitutional: Positive for activity change. Negative for chills, fever and unexpected weight change.   HENT: Negative for dental problem, mouth sores, sore throat and trouble swallowing.    Eyes: Negative for visual disturbance.   Respiratory: Negative for cough, chest tightness and shortness of breath.    Cardiovascular: Negative for chest pain, palpitations and leg swelling.   Gastrointestinal: Positive for diarrhea. Negative for abdominal distention, blood in stool, constipation, nausea, rectal pain and vomiting.   Genitourinary: Negative for dysuria and hematuria.   Musculoskeletal: Positive for arthralgias, back pain, gait problem (left hip pain and weakness ) and neck pain (pt c/o pain to his right shoulder and neck area; reports radiates down right arm; reports he has been trying to cut back on painting to see if this alleviates pain). Negative for joint swelling and myalgias.   Skin: Negative for pallor and rash.   Neurological: Negative for dizziness, syncope, weakness, light-headedness, numbness and headaches.   Hematological: Negative for adenopathy. Does not bruise/bleed easily.   Psychiatric/Behavioral: Negative for agitation and suicidal ideas.       Objective:     Physical Exam  Constitutional:       Appearance: He is well-developed.   Neck:      Musculoskeletal: Normal range of motion.   Cardiovascular:      Rate and Rhythm: Normal rate and regular rhythm.   Pulmonary:      Effort: Pulmonary effort is normal.      Breath sounds: Normal breath sounds.   Musculoskeletal:         General: Tenderness present.      Right shoulder: He exhibits decreased range of motion and tenderness.   Neurological:      Mental Status: He is alert and oriented to person, place, and time.       Vitals:    08/11/20 1023   BP: (!) 143/95    Pulse: 88   Resp: 14   Temp: 98.2 °F (36.8 °C)   Body surface area is 1.82 meters squared.    Labs:  Lab Results   Component Value Date    WBC 4.8 06/30/2020    HGB 13.0 (L) 06/30/2020    HCT 39.4 (L) 06/30/2020    LABPLAT 112 (L) 06/30/2020       CMP  Sodium   Date Value Ref Range Status   06/30/2020 137 135 - 145 mmol/L Final     Potassium   Date Value Ref Range Status   06/30/2020 3.4 (L) 3.6 - 5.2 mmol/L Final     Chloride   Date Value Ref Range Status   06/30/2020 101 100 - 108 mmol/L Final     CO2   Date Value Ref Range Status   06/30/2020 26 21 - 32 mmol/L Final     Glucose   Date Value Ref Range Status   06/30/2020 108 70 - 110 mg/dL Final     BUN, Bld   Date Value Ref Range Status   06/30/2020 8 7 - 18 mg/dL Final     Creatinine   Date Value Ref Range Status   06/30/2020 1.01 0.70 - 1.30 mg/dL Final     Calcium   Date Value Ref Range Status   06/30/2020 9.1 8.8 - 10.5 mg/dL Final     Total Protein   Date Value Ref Range Status   06/30/2020 7.8 6.4 - 8.2 g/dL Final     Albumin   Date Value Ref Range Status   06/30/2020 3.5 3.4 - 5.0 g/dL Final     Total Bilirubin   Date Value Ref Range Status   06/30/2020 0.6 0.0 - 1.0 mg/dL Final     Alkaline Phosphatase   Date Value Ref Range Status   06/30/2020 74 46 - 116 U/L Final     AST   Date Value Ref Range Status   06/30/2020 42 (H) 15 - 37 U/L Final     ALT   Date Value Ref Range Status   06/30/2020 44 12 - 78 U/L Final     Anion Gap   Date Value Ref Range Status   06/30/2020 10.0 3.0 - 11.0 mmol/L Final         Assessment:      1. Malignant neoplasm of left lung stage 4    2. Metastatic cancer to bone    3. Cancer related pain           Plan:   There are no diagnoses linked to this encounter.  1.Discussed recent CT scan showing continued stable disease. Discussed tx holiday as pt has completed close to 2 years of IO and has had stable CT scans over the last several months.  2. PT is agreeable to tx holiday but is aware that if cancer becomes active will resume  IO at that time  3. Pt requesting pain med refill, pt is taking 2-3 tabs per day, discussed decreasing dosage frequency and amt as we begin to wean down to lowest tolerable dose.   4. RTC in 4 weeks with scans, delay in scans due to new phone number   5. C/o of continued diarrhea in-spite of tx holiday, stooling 6-8 times a day, denies any blood will refer to GI for evaluation   6. veristrat testing discussed    Faith Swain, ANP

## 2020-08-13 ENCOUNTER — TELEPHONE (OUTPATIENT)
Dept: HEMATOLOGY/ONCOLOGY | Facility: CLINIC | Age: 55
End: 2020-08-13

## 2020-09-03 ENCOUNTER — TELEPHONE (OUTPATIENT)
Dept: INFUSION THERAPY | Facility: HOSPITAL | Age: 55
End: 2020-09-03

## 2020-09-03 NOTE — TELEPHONE ENCOUNTER
Spoke w/ pts sister in regards to inbasket message. Sister stated that pt was evacuated to [place], [place] and is possibly housed at the [facility] but she has been unsuccessful in getting to talk to pt or [facility]. Explained current situation to sister and that someone will reach out to her or the pt if his appt does need to be rescheduled or changed to another location. Sister given my direct contact information in case she has any more questions or concerns.

## 2020-09-29 ENCOUNTER — TELEPHONE (OUTPATIENT)
Dept: HEMATOLOGY/ONCOLOGY | Facility: CLINIC | Age: 55
End: 2020-09-29

## 2020-09-29 NOTE — TELEPHONE ENCOUNTER
Spoke to pt regarding scheduling appt. Pt stated he is still in [place] and will schedule when back in town.

## 2020-11-17 ENCOUNTER — OFFICE VISIT (OUTPATIENT)
Dept: HEMATOLOGY/ONCOLOGY | Facility: CLINIC | Age: 55
End: 2020-11-17
Payer: MEDICAID

## 2020-11-17 ENCOUNTER — TELEPHONE (OUTPATIENT)
Dept: HEMATOLOGY/ONCOLOGY | Facility: CLINIC | Age: 55
End: 2020-11-17

## 2020-11-17 VITALS
WEIGHT: 152.19 LBS | RESPIRATION RATE: 16 BRPM | DIASTOLIC BLOOD PRESSURE: 90 MMHG | OXYGEN SATURATION: 95 % | HEIGHT: 64 IN | BODY MASS INDEX: 25.98 KG/M2 | HEART RATE: 77 BPM | SYSTOLIC BLOOD PRESSURE: 150 MMHG | TEMPERATURE: 97 F

## 2020-11-17 DIAGNOSIS — C79.51 METASTATIC CANCER TO BONE: ICD-10-CM

## 2020-11-17 DIAGNOSIS — C34.92 MALIGNANT NEOPLASM OF LEFT LUNG STAGE 4: Primary | ICD-10-CM

## 2020-11-17 DIAGNOSIS — E03.9 HYPOTHYROIDISM, UNSPECIFIED TYPE: ICD-10-CM

## 2020-11-17 DIAGNOSIS — G89.3 CANCER RELATED PAIN: ICD-10-CM

## 2020-11-17 LAB
ALBUMIN SERPL BCP-MCNC: 4.2 G/DL (ref 3.4–5)
ALBUMIN/GLOBULIN RATIO: 0.95 RATIO (ref 1.1–1.8)
ALP SERPL-CCNC: 64 U/L (ref 46–116)
ALT SERPL W P-5'-P-CCNC: 51 U/L (ref 12–78)
ANION GAP SERPL CALC-SCNC: 8 MMOL/L (ref 3–11)
ANISOCYTOSIS: ABNORMAL
AST SERPL-CCNC: 60 U/L (ref 15–37)
BANDS: 3 % (ref 0–5)
BILIRUB SERPL-MCNC: 0.4 MG/DL (ref 0–1)
BUN SERPL-MCNC: 8 MG/DL (ref 7–18)
BUN/CREAT SERPL: 6.5 RATIO (ref 7–18)
CALCIUM SERPL-MCNC: 9 MG/DL (ref 8.8–10.5)
CELLS COUNTED: 100
CHLORIDE SERPL-SCNC: 97 MMOL/L (ref 100–108)
CO2 SERPL-SCNC: 29 MMOL/L (ref 21–32)
CREAT SERPL-MCNC: 1.23 MG/DL (ref 0.7–1.3)
EOSINOPHIL NFR BLD: 4 % (ref 1–3)
ERYTHROCYTE [DISTWIDTH] IN BLOOD BY AUTOMATED COUNT: 18.7 % (ref 12.5–18)
GFR ESTIMATION: > 60
GLOBULIN: 4.4 G/DL (ref 2.3–3.5)
GLUCOSE SERPL-MCNC: 99 MG/DL (ref 70–110)
HCT VFR BLD AUTO: 39.5 % (ref 42–52)
HGB BLD-MCNC: 13.2 G/DL (ref 14–18)
LYMPHOCYTES NFR BLD: 49 % (ref 25–40)
MACROCYTES BLD QL SMEAR: ABNORMAL
MCH RBC QN AUTO: 35.6 PG (ref 27–31.2)
MCHC RBC AUTO-ENTMCNC: 33.4 G/DL (ref 31.8–35.4)
MCV RBC AUTO: 106.5 FL (ref 80–97)
MONOCYTES NFR BLD: 7 % (ref 1–15)
NEUTROPHILS # BLD AUTO: 1.3 10*3/UL (ref 1.8–7.7)
NEUTROPHILS NFR BLD: 37 % (ref 37–80)
NUCLEATED RED BLOOD CELLS: 0 %
PLATELETS: 119 10*3/UL (ref 142–424)
POTASSIUM SERPL-SCNC: 3.6 MMOL/L (ref 3.6–5.2)
PROT SERPL-MCNC: 8.6 G/DL (ref 6.4–8.2)
RBC # BLD AUTO: 3.71 10*6/UL (ref 4.7–6.1)
SMALL PLATELETS BLD QL SMEAR: ADEQUATE
SODIUM BLD-SCNC: 134 MMOL/L (ref 135–145)
TSH SERPL DL<=0.005 MIU/L-ACNC: 228.98 UIU/ML (ref 0.36–3.74)
WBC # BLD: 3.3 10*3/UL (ref 4.6–10.2)

## 2020-11-17 PROCEDURE — 99213 PR OFFICE/OUTPT VISIT, EST, LEVL III, 20-29 MIN: ICD-10-PCS | Mod: S$GLB,,, | Performed by: NURSE PRACTITIONER

## 2020-11-17 PROCEDURE — 99213 OFFICE O/P EST LOW 20 MIN: CPT | Mod: S$GLB,,, | Performed by: NURSE PRACTITIONER

## 2020-11-17 RX ORDER — LEVOTHYROXINE SODIUM 300 UG/1
300 TABLET ORAL DAILY
Qty: 45 TABLET | Refills: 11 | Status: SHIPPED | OUTPATIENT
Start: 2020-11-17 | End: 2020-12-11 | Stop reason: SDUPTHER

## 2020-11-17 RX ORDER — LEVOTHYROXINE SODIUM 300 UG/1
300 TABLET ORAL DAILY
Qty: 45 TABLET | Refills: 11 | Status: SHIPPED | OUTPATIENT
Start: 2020-11-17 | End: 2020-11-17 | Stop reason: SDUPTHER

## 2020-11-17 RX ORDER — OXYCODONE AND ACETAMINOPHEN 7.5; 325 MG/1; MG/1
1 TABLET ORAL EVERY 8 HOURS PRN
Qty: 45 TABLET | Refills: 0 | Status: SHIPPED | OUTPATIENT
Start: 2020-11-17 | End: 2020-12-11 | Stop reason: SDUPTHER
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
## 2020-11-17 NOTE — TELEPHONE ENCOUNTER
Confirmed TSH level with lab. Attempted to call pt. No answer/vm. Pt needs to get back on Synthroid ASAP per Faith Swain. Called sister who is listed as an additional contact. Also no answer, but left VM with a call back number. KB LPN.

## 2020-12-08 ENCOUNTER — TELEPHONE (OUTPATIENT)
Dept: HEMATOLOGY/ONCOLOGY | Facility: CLINIC | Age: 55
End: 2020-12-08

## 2020-12-10 PROBLEM — C79.71 MALIGNANT NEOPLASM METASTATIC TO RIGHT ADRENAL GLAND: Status: ACTIVE | Noted: 2020-12-10
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
## 2020-12-11 ENCOUNTER — OFFICE VISIT (OUTPATIENT)
Dept: HEMATOLOGY/ONCOLOGY | Facility: CLINIC | Age: 55
End: 2020-12-11
Payer: MEDICAID

## 2020-12-11 VITALS
TEMPERATURE: 98 F | HEIGHT: 64 IN | BODY MASS INDEX: 27.08 KG/M2 | HEART RATE: 79 BPM | WEIGHT: 158.63 LBS | RESPIRATION RATE: 18 BRPM | SYSTOLIC BLOOD PRESSURE: 137 MMHG | OXYGEN SATURATION: 93 % | DIASTOLIC BLOOD PRESSURE: 88 MMHG

## 2020-12-11 DIAGNOSIS — C79.72 MALIGNANT NEOPLASM METASTATIC TO LEFT ADRENAL GLAND: ICD-10-CM

## 2020-12-11 DIAGNOSIS — C79.51 METASTATIC CANCER TO BONE: ICD-10-CM

## 2020-12-11 DIAGNOSIS — G89.3 CANCER RELATED PAIN: ICD-10-CM

## 2020-12-11 DIAGNOSIS — C34.92 MALIGNANT NEOPLASM OF LEFT LUNG STAGE 4: Primary | ICD-10-CM

## 2020-12-11 DIAGNOSIS — E03.9 HYPOTHYROIDISM, UNSPECIFIED TYPE: ICD-10-CM

## 2020-12-11 LAB
ALBUMIN SERPL BCP-MCNC: 4.2 G/DL (ref 3.4–5)
ALBUMIN/GLOBULIN RATIO: 1.05 RATIO (ref 1.1–1.8)
ALP SERPL-CCNC: 64 U/L (ref 46–116)
ALT SERPL W P-5'-P-CCNC: 36 U/L (ref 12–78)
ANION GAP SERPL CALC-SCNC: 9 MMOL/L (ref 3–11)
AST SERPL-CCNC: 40 U/L (ref 15–37)
BASOPHILS NFR BLD: 0.7 % (ref 0–3)
BILIRUB SERPL-MCNC: 0.5 MG/DL (ref 0–1)
BUN SERPL-MCNC: 13 MG/DL (ref 7–18)
BUN/CREAT SERPL: 10 RATIO (ref 7–18)
CALCIUM SERPL-MCNC: 9.7 MG/DL (ref 8.8–10.5)
CHLORIDE SERPL-SCNC: 101 MMOL/L (ref 100–108)
CO2 SERPL-SCNC: 28 MMOL/L (ref 21–32)
CREAT SERPL-MCNC: 1.3 MG/DL (ref 0.7–1.3)
EOSINOPHIL NFR BLD: 2 % (ref 1–3)
ERYTHROCYTE [DISTWIDTH] IN BLOOD BY AUTOMATED COUNT: 17.5 % (ref 12.5–18)
GFR ESTIMATION: > 60
GLOBULIN: 4 G/DL (ref 2.3–3.5)
GLUCOSE SERPL-MCNC: 102 MG/DL (ref 70–110)
HCT VFR BLD AUTO: 37.4 % (ref 42–52)
HGB BLD-MCNC: 12.2 G/DL (ref 14–18)
LYMPHOCYTES NFR BLD: 31.2 % (ref 25–40)
MACROCYTES BLD QL SMEAR: NORMAL
MCH RBC QN AUTO: 36.1 PG (ref 27–31.2)
MCHC RBC AUTO-ENTMCNC: 32.6 G/DL (ref 31.8–35.4)
MCV RBC AUTO: 110.7 FL (ref 80–97)
MONOCYTES NFR BLD: 7.2 % (ref 1–15)
NEUTROPHILS # BLD AUTO: 2.58 10*3/UL (ref 1.8–7.7)
NEUTROPHILS NFR BLD: 57.8 % (ref 37–80)
NUCLEATED RED BLOOD CELLS: 0 %
PLATELETS: 106 10*3/UL (ref 142–424)
POTASSIUM SERPL-SCNC: 3.9 MMOL/L (ref 3.6–5.2)
PROT SERPL-MCNC: 8.2 G/DL (ref 6.4–8.2)
RBC # BLD AUTO: 3.38 10*6/UL (ref 4.7–6.1)
SODIUM BLD-SCNC: 138 MMOL/L (ref 135–145)
TSH SERPL DL<=0.005 MIU/L-ACNC: 125.77 UIU/ML (ref 0.36–3.74)
WBC # BLD: 4.5 10*3/UL (ref 4.6–10.2)

## 2020-12-11 PROCEDURE — 99214 OFFICE O/P EST MOD 30 MIN: CPT | Mod: S$GLB,,, | Performed by: NURSE PRACTITIONER

## 2020-12-11 PROCEDURE — 99214 PR OFFICE/OUTPT VISIT, EST, LEVL IV, 30-39 MIN: ICD-10-PCS | Mod: S$GLB,,, | Performed by: NURSE PRACTITIONER

## 2020-12-11 RX ORDER — LEVOTHYROXINE SODIUM 300 UG/1
300 TABLET ORAL DAILY
Qty: 45 TABLET | Refills: 11 | Status: SHIPPED | OUTPATIENT
Start: 2020-12-11 | End: 2021-09-02

## 2020-12-11 RX ORDER — OXYCODONE AND ACETAMINOPHEN 7.5; 325 MG/1; MG/1
1 TABLET ORAL EVERY 8 HOURS PRN
Qty: 45 TABLET | Refills: 0 | Status: SHIPPED | OUTPATIENT
Start: 2020-12-11 | End: 2021-02-01 | Stop reason: SDUPTHER

## 2020-12-17 ENCOUNTER — TELEPHONE (OUTPATIENT)
Dept: HEMATOLOGY/ONCOLOGY | Facility: CLINIC | Age: 55
End: 2020-12-17

## 2020-12-17 NOTE — TELEPHONE ENCOUNTER
Patient no showed therapy appointment today. Called to assess any barriers and to get rescheduled. No answer. Left a message.

## 2020-12-31 ENCOUNTER — TELEPHONE (OUTPATIENT)
Dept: HEMATOLOGY/ONCOLOGY | Facility: CLINIC | Age: 55
End: 2020-12-31

## 2020-12-31 NOTE — TELEPHONE ENCOUNTER
----- Message from Lacey Morse sent at 12/31/2020  9:45 AM CST -----  Patient need to speak to nurse regarding his treatment. Call back number [phone]. Gregory

## 2020-12-31 NOTE — TELEPHONE ENCOUNTER
Spoke with Mr. Sandoval, he stated that he was in serve pain. He feel like the pain is coming from his kidney. I advised pt to go to [facility] ED. Pt. Verbalized that he understood and would head to the ED.

## 2021-02-01 ENCOUNTER — OFFICE VISIT (OUTPATIENT)
Dept: HEMATOLOGY/ONCOLOGY | Facility: CLINIC | Age: 56
End: 2021-02-01
Payer: MEDICAID

## 2021-02-01 ENCOUNTER — TELEPHONE (OUTPATIENT)
Dept: HEMATOLOGY/ONCOLOGY | Facility: CLINIC | Age: 56
End: 2021-02-01

## 2021-02-01 VITALS
DIASTOLIC BLOOD PRESSURE: 93 MMHG | WEIGHT: 151 LBS | SYSTOLIC BLOOD PRESSURE: 159 MMHG | RESPIRATION RATE: 16 BRPM | TEMPERATURE: 98 F | OXYGEN SATURATION: 94 % | HEIGHT: 64 IN | HEART RATE: 69 BPM | BODY MASS INDEX: 25.78 KG/M2

## 2021-02-01 DIAGNOSIS — G89.3 CANCER RELATED PAIN: ICD-10-CM

## 2021-02-01 DIAGNOSIS — C79.71 MALIGNANT NEOPLASM METASTATIC TO RIGHT ADRENAL GLAND: Primary | ICD-10-CM

## 2021-02-01 DIAGNOSIS — C79.51 METASTATIC CANCER TO BONE: ICD-10-CM

## 2021-02-01 DIAGNOSIS — Z91.199 MEDICALLY NONCOMPLIANT: ICD-10-CM

## 2021-02-01 DIAGNOSIS — C34.92 MALIGNANT NEOPLASM OF LEFT LUNG STAGE 4: ICD-10-CM

## 2021-02-01 LAB
ALBUMIN SERPL BCP-MCNC: 3.6 G/DL (ref 3.4–5)
ALBUMIN/GLOBULIN RATIO: 0.86 RATIO (ref 1.1–1.8)
ALP SERPL-CCNC: 75 U/L (ref 46–116)
ALT SERPL W P-5'-P-CCNC: 23 U/L (ref 12–78)
ANION GAP SERPL CALC-SCNC: 9 MMOL/L (ref 3–11)
AST SERPL-CCNC: 28 U/L (ref 15–37)
BASOPHILS NFR BLD: 0.5 % (ref 0–3)
BILIRUB SERPL-MCNC: 0.2 MG/DL (ref 0–1)
BUN SERPL-MCNC: 8 MG/DL (ref 7–18)
BUN/CREAT SERPL: 7.61 RATIO (ref 7–18)
CALCIUM SERPL-MCNC: 9.3 MG/DL (ref 8.8–10.5)
CHLORIDE SERPL-SCNC: 100 MMOL/L (ref 100–108)
CO2 SERPL-SCNC: 28 MMOL/L (ref 21–32)
CREAT SERPL-MCNC: 1.05 MG/DL (ref 0.7–1.3)
EOSINOPHIL NFR BLD: 2.8 % (ref 1–3)
ERYTHROCYTE [DISTWIDTH] IN BLOOD BY AUTOMATED COUNT: 14.1 % (ref 12.5–18)
GFR ESTIMATION: > 60
GLOBULIN: 4.2 G/DL (ref 2.3–3.5)
GLUCOSE SERPL-MCNC: 97 MG/DL (ref 70–110)
HCT VFR BLD AUTO: 44 % (ref 42–52)
HGB BLD-MCNC: 14.2 G/DL (ref 14–18)
LYMPHOCYTES NFR BLD: 28.1 % (ref 25–40)
MACROCYTES BLD QL SMEAR: NORMAL
MCH RBC QN AUTO: 33.8 PG (ref 27–31.2)
MCHC RBC AUTO-ENTMCNC: 32.3 G/DL (ref 31.8–35.4)
MCV RBC AUTO: 104.8 FL (ref 80–97)
MONOCYTES NFR BLD: 6.8 % (ref 1–15)
NEUTROPHILS # BLD AUTO: 3.89 10*3/UL (ref 1.8–7.7)
NEUTROPHILS NFR BLD: 61.2 % (ref 37–80)
NUCLEATED RED BLOOD CELLS: 0 %
PLATELETS: 128 10*3/UL (ref 142–424)
POTASSIUM SERPL-SCNC: 4 MMOL/L (ref 3.6–5.2)
PROT SERPL-MCNC: 7.8 G/DL (ref 6.4–8.2)
RBC # BLD AUTO: 4.2 10*6/UL (ref 4.7–6.1)
SODIUM BLD-SCNC: 137 MMOL/L (ref 135–145)
TSH SERPL DL<=0.005 MIU/L-ACNC: 174.47 UIU/ML (ref 0.36–3.74)
WBC # BLD: 6.4 10*3/UL (ref 4.6–10.2)

## 2021-02-01 PROCEDURE — 99214 PR OFFICE/OUTPT VISIT, EST, LEVL IV, 30-39 MIN: ICD-10-PCS | Mod: S$GLB,,, | Performed by: NURSE PRACTITIONER

## 2021-02-01 PROCEDURE — 99214 OFFICE O/P EST MOD 30 MIN: CPT | Mod: S$GLB,,, | Performed by: NURSE PRACTITIONER

## 2021-02-01 RX ORDER — OXYCODONE AND ACETAMINOPHEN 7.5; 325 MG/1; MG/1
1 TABLET ORAL EVERY 8 HOURS PRN
Qty: 45 TABLET | Refills: 0 | Status: SHIPPED | OUTPATIENT
Start: 2021-02-01 | End: 2021-03-31 | Stop reason: SDUPTHER

## 2021-02-01 RX ORDER — HEPARIN 100 UNIT/ML
500 SYRINGE INTRAVENOUS
Status: CANCELLED | OUTPATIENT
Start: 2021-03-31

## 2021-02-01 RX ORDER — SODIUM CHLORIDE 0.9 % (FLUSH) 0.9 %
10 SYRINGE (ML) INJECTION
Status: CANCELLED | OUTPATIENT
Start: 2021-03-31

## 2021-03-17 ENCOUNTER — OFFICE VISIT (OUTPATIENT)
Dept: HEMATOLOGY/ONCOLOGY | Facility: CLINIC | Age: 56
End: 2021-03-17
Payer: MEDICAID

## 2021-03-17 ENCOUNTER — TELEPHONE (OUTPATIENT)
Dept: HEMATOLOGY/ONCOLOGY | Facility: CLINIC | Age: 56
End: 2021-03-17

## 2021-03-17 VITALS
DIASTOLIC BLOOD PRESSURE: 103 MMHG | TEMPERATURE: 99 F | WEIGHT: 139 LBS | OXYGEN SATURATION: 93 % | HEART RATE: 78 BPM | SYSTOLIC BLOOD PRESSURE: 158 MMHG | HEIGHT: 64 IN | BODY MASS INDEX: 23.73 KG/M2 | RESPIRATION RATE: 12 BRPM

## 2021-03-17 DIAGNOSIS — C34.92 MALIGNANT NEOPLASM OF LEFT LUNG STAGE 4: ICD-10-CM

## 2021-03-17 DIAGNOSIS — C79.71 MALIGNANT NEOPLASM METASTATIC TO RIGHT ADRENAL GLAND: ICD-10-CM

## 2021-03-17 DIAGNOSIS — G89.3 CANCER RELATED PAIN: Primary | ICD-10-CM

## 2021-03-17 DIAGNOSIS — C79.51 METASTATIC CANCER TO BONE: ICD-10-CM

## 2021-03-17 LAB
ALBUMIN SERPL BCP-MCNC: 4.1 G/DL (ref 3.4–5)
ALBUMIN/GLOBULIN RATIO: 0.91 RATIO (ref 1.1–1.8)
ALP SERPL-CCNC: 86 U/L (ref 46–116)
ALT SERPL W P-5'-P-CCNC: 94 U/L (ref 12–78)
ANION GAP SERPL CALC-SCNC: 12 MMOL/L (ref 3–11)
AST SERPL-CCNC: 93 U/L (ref 15–37)
BASOPHILS NFR BLD: 0.2 % (ref 0–3)
BILIRUB SERPL-MCNC: 0.7 MG/DL (ref 0–1)
BUN SERPL-MCNC: 13 MG/DL (ref 7–18)
BUN/CREAT SERPL: 14.44 RATIO (ref 7–18)
CALCIUM SERPL-MCNC: 9.8 MG/DL (ref 8.8–10.5)
CHLORIDE SERPL-SCNC: 100 MMOL/L (ref 100–108)
CO2 SERPL-SCNC: 26 MMOL/L (ref 21–32)
CREAT SERPL-MCNC: 0.9 MG/DL (ref 0.7–1.3)
EOSINOPHIL NFR BLD: 1.7 % (ref 1–3)
ERYTHROCYTE [DISTWIDTH] IN BLOOD BY AUTOMATED COUNT: 16.3 % (ref 12.5–18)
ETHANOL UR QL: < 3 MG/DL (ref 0–3)
GFR ESTIMATION: > 60
GLOBULIN: 4.5 G/DL (ref 2.3–3.5)
GLUCOSE SERPL-MCNC: 108 MG/DL (ref 70–110)
HCT VFR BLD AUTO: 42.3 % (ref 42–52)
HGB BLD-MCNC: 13.9 G/DL (ref 14–18)
LYMPHOCYTES NFR BLD: 33.1 % (ref 25–40)
MCH RBC QN AUTO: 32.7 PG (ref 27–31.2)
MCHC RBC AUTO-ENTMCNC: 32.9 G/DL (ref 31.8–35.4)
MCV RBC AUTO: 99.5 FL (ref 80–97)
MONOCYTES NFR BLD: 10.2 % (ref 1–15)
NEUTROPHILS # BLD AUTO: 2.93 10*3/UL (ref 1.8–7.7)
NEUTROPHILS NFR BLD: 54.6 % (ref 37–80)
NUCLEATED RED BLOOD CELLS: 0 %
PLATELETS: 58 10*3/UL (ref 142–424)
POTASSIUM SERPL-SCNC: 3.2 MMOL/L (ref 3.6–5.2)
PROT SERPL-MCNC: 8.6 G/DL (ref 6.4–8.2)
RBC # BLD AUTO: 4.25 10*6/UL (ref 4.7–6.1)
SMALL PLATELETS BLD QL SMEAR: NORMAL
SODIUM BLD-SCNC: 138 MMOL/L (ref 135–145)
TSH SERPL DL<=0.005 MIU/L-ACNC: 2.3 UIU/ML (ref 0.36–3.74)
WBC # BLD: 5.4 10*3/UL (ref 4.6–10.2)

## 2021-03-17 PROCEDURE — 99214 PR OFFICE/OUTPT VISIT, EST, LEVL IV, 30-39 MIN: ICD-10-PCS | Mod: S$GLB,,, | Performed by: NURSE PRACTITIONER

## 2021-03-17 PROCEDURE — 99214 OFFICE O/P EST MOD 30 MIN: CPT | Mod: S$GLB,,, | Performed by: NURSE PRACTITIONER

## 2021-03-17 RX ORDER — HYDROMORPHONE HYDROCHLORIDE 4 MG/1
4 TABLET ORAL EVERY 6 HOURS PRN
Qty: 30 TABLET | Refills: 0 | Status: SHIPPED | OUTPATIENT
Start: 2021-03-17 | End: 2021-03-31

## 2021-03-22 ENCOUNTER — TELEPHONE (OUTPATIENT)
Dept: HEMATOLOGY/ONCOLOGY | Facility: CLINIC | Age: 56
End: 2021-03-22

## 2021-03-24 LAB
ALBUMIN SERPL BCP-MCNC: 3.9 G/DL (ref 3.4–5)
ALBUMIN/GLOBULIN RATIO: 0.89 RATIO (ref 1.1–1.8)
ALP SERPL-CCNC: 97 U/L (ref 46–116)
ALT SERPL W P-5'-P-CCNC: 85 U/L (ref 12–78)
ANION GAP SERPL CALC-SCNC: 12 MMOL/L (ref 3–11)
AST SERPL-CCNC: 87 U/L (ref 15–37)
BASOPHILS NFR BLD: 0.8 % (ref 0–3)
BILIRUB SERPL-MCNC: 0.5 MG/DL (ref 0–1)
BUN SERPL-MCNC: 4 MG/DL (ref 7–18)
BUN/CREAT SERPL: 5.19 RATIO (ref 7–18)
CALCIUM SERPL-MCNC: 9 MG/DL (ref 8.8–10.5)
CHLORIDE SERPL-SCNC: 99 MMOL/L (ref 100–108)
CO2 SERPL-SCNC: 28 MMOL/L (ref 21–32)
CREAT SERPL-MCNC: 0.77 MG/DL (ref 0.7–1.3)
EOSINOPHIL NFR BLD: 2.3 % (ref 1–3)
ERYTHROCYTE [DISTWIDTH] IN BLOOD BY AUTOMATED COUNT: 16.2 % (ref 12.5–18)
GFR ESTIMATION: > 60
GLOBULIN: 4.4 G/DL (ref 2.3–3.5)
GLUCOSE SERPL-MCNC: 105 MG/DL (ref 70–110)
HCT VFR BLD AUTO: 44.1 % (ref 42–52)
HGB BLD-MCNC: 14.5 G/DL (ref 14–18)
LYMPHOCYTES NFR BLD: 49.4 % (ref 25–40)
MCH RBC QN AUTO: 32.6 PG (ref 27–31.2)
MCHC RBC AUTO-ENTMCNC: 32.9 G/DL (ref 31.8–35.4)
MCV RBC AUTO: 99.1 FL (ref 80–97)
MONOCYTES NFR BLD: 9.4 % (ref 1–15)
NEUTROPHILS # BLD AUTO: 1.49 10*3/UL (ref 1.8–7.7)
NEUTROPHILS NFR BLD: 37.8 % (ref 37–80)
NUCLEATED RED BLOOD CELLS: 0 %
PLATELETS: 111 10*3/UL (ref 142–424)
POTASSIUM SERPL-SCNC: 3.3 MMOL/L (ref 3.6–5.2)
PROT SERPL-MCNC: 8.3 G/DL (ref 6.4–8.2)
RBC # BLD AUTO: 4.45 10*6/UL (ref 4.7–6.1)
SODIUM BLD-SCNC: 139 MMOL/L (ref 135–145)
TSH SERPL DL<=0.005 MIU/L-ACNC: 25.92 UIU/ML (ref 0.36–3.74)
WBC # BLD: 3.9 10*3/UL (ref 4.6–10.2)

## 2021-03-31 ENCOUNTER — OFFICE VISIT (OUTPATIENT)
Dept: HEMATOLOGY/ONCOLOGY | Facility: CLINIC | Age: 56
End: 2021-03-31
Payer: MEDICAID

## 2021-03-31 VITALS
HEIGHT: 64 IN | OXYGEN SATURATION: 95 % | HEART RATE: 93 BPM | TEMPERATURE: 98 F | WEIGHT: 144.88 LBS | BODY MASS INDEX: 24.74 KG/M2 | RESPIRATION RATE: 18 BRPM | SYSTOLIC BLOOD PRESSURE: 132 MMHG | DIASTOLIC BLOOD PRESSURE: 82 MMHG

## 2021-03-31 DIAGNOSIS — C79.51 METASTATIC CANCER TO BONE: ICD-10-CM

## 2021-03-31 DIAGNOSIS — C34.92 MALIGNANT NEOPLASM OF LEFT LUNG STAGE 4: ICD-10-CM

## 2021-03-31 DIAGNOSIS — G89.3 CANCER RELATED PAIN: ICD-10-CM

## 2021-03-31 PROCEDURE — 99214 PR OFFICE/OUTPT VISIT, EST, LEVL IV, 30-39 MIN: ICD-10-PCS | Mod: S$GLB,,, | Performed by: NURSE PRACTITIONER

## 2021-03-31 PROCEDURE — 99214 OFFICE O/P EST MOD 30 MIN: CPT | Mod: S$GLB,,, | Performed by: NURSE PRACTITIONER

## 2021-03-31 RX ORDER — OXYCODONE AND ACETAMINOPHEN 7.5; 325 MG/1; MG/1
1 TABLET ORAL EVERY 8 HOURS PRN
Qty: 45 TABLET | Refills: 0 | Status: SHIPPED | OUTPATIENT
Start: 2021-03-31 | End: 2021-05-27 | Stop reason: SDUPTHER

## 2021-04-28 LAB
ALBUMIN SERPL BCP-MCNC: 3.6 G/DL (ref 3.4–5)
ALBUMIN/GLOBULIN RATIO: 0.78 RATIO (ref 1.1–1.8)
ALP SERPL-CCNC: 73 U/L (ref 46–116)
ALT SERPL W P-5'-P-CCNC: 46 U/L (ref 12–78)
ANION GAP SERPL CALC-SCNC: 5 MMOL/L (ref 3–11)
AST SERPL-CCNC: 33 U/L (ref 15–37)
BASOPHILS NFR BLD: 0.3 % (ref 0–3)
BILIRUB SERPL-MCNC: 0.5 MG/DL (ref 0–1)
BUN SERPL-MCNC: 9 MG/DL (ref 7–18)
BUN/CREAT SERPL: 10.71 RATIO (ref 7–18)
CALCIUM SERPL-MCNC: 8.9 MG/DL (ref 8.8–10.5)
CHLORIDE SERPL-SCNC: 103 MMOL/L (ref 100–108)
CO2 SERPL-SCNC: 31 MMOL/L (ref 21–32)
CREAT SERPL-MCNC: 0.84 MG/DL (ref 0.7–1.3)
EOSINOPHIL NFR BLD: 4.1 % (ref 1–3)
ERYTHROCYTE [DISTWIDTH] IN BLOOD BY AUTOMATED COUNT: 15.1 % (ref 12.5–18)
GFR ESTIMATION: > 60
GLOBULIN: 4.6 G/DL (ref 2.3–3.5)
GLUCOSE SERPL-MCNC: 111 MG/DL (ref 70–110)
HCT VFR BLD AUTO: 41.4 % (ref 42–52)
HGB BLD-MCNC: 13.1 G/DL (ref 14–18)
LYMPHOCYTES NFR BLD: 25.5 % (ref 25–40)
MACROCYTES BLD QL SMEAR: NORMAL
MCH RBC QN AUTO: 32.9 PG (ref 27–31.2)
MCHC RBC AUTO-ENTMCNC: 31.6 G/DL (ref 31.8–35.4)
MCV RBC AUTO: 104 FL (ref 80–97)
MONOCYTES NFR BLD: 11.4 % (ref 1–15)
NEUTROPHILS # BLD AUTO: 3.79 10*3/UL (ref 1.8–7.7)
NEUTROPHILS NFR BLD: 58.2 % (ref 37–80)
NUCLEATED RED BLOOD CELLS: 0 %
PLATELETS: 145 10*3/UL (ref 142–424)
POTASSIUM SERPL-SCNC: 3.8 MMOL/L (ref 3.6–5.2)
PROT SERPL-MCNC: 8.2 G/DL (ref 6.4–8.2)
RBC # BLD AUTO: 3.98 10*6/UL (ref 4.7–6.1)
SODIUM BLD-SCNC: 139 MMOL/L (ref 135–145)
TSH SERPL DL<=0.005 MIU/L-ACNC: <0.01 UIU/ML (ref 0.36–3.74)
WBC # BLD: 6.5 10*3/UL (ref 4.6–10.2)

## 2021-05-27 ENCOUNTER — OFFICE VISIT (OUTPATIENT)
Dept: HEMATOLOGY/ONCOLOGY | Facility: CLINIC | Age: 56
End: 2021-05-27
Payer: MEDICAID

## 2021-05-27 ENCOUNTER — TELEPHONE (OUTPATIENT)
Dept: HEMATOLOGY/ONCOLOGY | Facility: CLINIC | Age: 56
End: 2021-05-27

## 2021-05-27 VITALS
BODY MASS INDEX: 23.73 KG/M2 | HEIGHT: 64 IN | WEIGHT: 139 LBS | RESPIRATION RATE: 16 BRPM | HEART RATE: 118 BPM | OXYGEN SATURATION: 95 % | DIASTOLIC BLOOD PRESSURE: 95 MMHG | SYSTOLIC BLOOD PRESSURE: 154 MMHG | TEMPERATURE: 98 F

## 2021-05-27 DIAGNOSIS — Z29.89 ENCOUNTER FOR IMMUNOTHERAPY: Primary | ICD-10-CM

## 2021-05-27 DIAGNOSIS — G89.3 CANCER RELATED PAIN: ICD-10-CM

## 2021-05-27 DIAGNOSIS — C79.51 METASTATIC CANCER TO BONE: ICD-10-CM

## 2021-05-27 DIAGNOSIS — C34.92 MALIGNANT NEOPLASM OF LEFT LUNG STAGE 4: ICD-10-CM

## 2021-05-27 LAB
ALBUMIN SERPL BCP-MCNC: 3.6 G/DL (ref 3.4–5)
ALBUMIN/GLOBULIN RATIO: 0.73 RATIO (ref 1.1–1.8)
ALP SERPL-CCNC: 72 U/L (ref 46–116)
ALT SERPL W P-5'-P-CCNC: 44 U/L (ref 12–78)
ANION GAP SERPL CALC-SCNC: 9 MMOL/L (ref 3–11)
AST SERPL-CCNC: 40 U/L (ref 15–37)
BASOPHILS NFR BLD: 0.6 % (ref 0–3)
BILIRUB SERPL-MCNC: 0.3 MG/DL (ref 0–1)
BUN SERPL-MCNC: 10 MG/DL (ref 7–18)
BUN/CREAT SERPL: 14.92 RATIO (ref 7–18)
CALCIUM SERPL-MCNC: 10 MG/DL (ref 8.8–10.5)
CHLORIDE SERPL-SCNC: 107 MMOL/L (ref 100–108)
CO2 SERPL-SCNC: 27 MMOL/L (ref 21–32)
CREAT SERPL-MCNC: 0.67 MG/DL (ref 0.7–1.3)
EOSINOPHIL NFR BLD: 3.2 % (ref 1–3)
ERYTHROCYTE [DISTWIDTH] IN BLOOD BY AUTOMATED COUNT: 13.8 % (ref 12.5–18)
GFR ESTIMATION: > 60
GLOBULIN: 4.9 G/DL (ref 2.3–3.5)
GLUCOSE SERPL-MCNC: 109 MG/DL (ref 70–110)
HCT VFR BLD AUTO: 42.6 % (ref 42–52)
HGB BLD-MCNC: 13.7 G/DL (ref 14–18)
LYMPHOCYTES NFR BLD: 30.3 % (ref 25–40)
MACROCYTES BLD QL SMEAR: NORMAL
MCH RBC QN AUTO: 33.2 PG (ref 27–31.2)
MCHC RBC AUTO-ENTMCNC: 32.2 G/DL (ref 31.8–35.4)
MCV RBC AUTO: 103.1 FL (ref 80–97)
MONOCYTES NFR BLD: 10.4 % (ref 1–15)
NEUTROPHILS # BLD AUTO: 3.66 10*3/UL (ref 1.8–7.7)
NEUTROPHILS NFR BLD: 55 % (ref 37–80)
NUCLEATED RED BLOOD CELLS: 0 %
PLATELETS: 144 10*3/UL (ref 142–424)
POTASSIUM SERPL-SCNC: 3.5 MMOL/L (ref 3.6–5.2)
PROT SERPL-MCNC: 8.5 G/DL (ref 6.4–8.2)
RBC # BLD AUTO: 4.13 10*6/UL (ref 4.7–6.1)
SODIUM BLD-SCNC: 143 MMOL/L (ref 135–145)
TSH SERPL DL<=0.005 MIU/L-ACNC: <0.01 UIU/ML (ref 0.36–3.74)
WBC # BLD: 6.6 10*3/UL (ref 4.6–10.2)

## 2021-05-27 PROCEDURE — 99214 OFFICE O/P EST MOD 30 MIN: CPT | Mod: S$GLB,,, | Performed by: NURSE PRACTITIONER

## 2021-05-27 PROCEDURE — 99214 PR OFFICE/OUTPT VISIT, EST, LEVL IV, 30-39 MIN: ICD-10-PCS | Mod: S$GLB,,, | Performed by: NURSE PRACTITIONER

## 2021-05-27 RX ORDER — OXYCODONE AND ACETAMINOPHEN 7.5; 325 MG/1; MG/1
1 TABLET ORAL EVERY 8 HOURS PRN
Qty: 90 TABLET | Refills: 0 | Status: SHIPPED | OUTPATIENT
Start: 2021-05-27 | End: 2021-07-08 | Stop reason: SDUPTHER

## 2021-05-27 RX ORDER — HEPARIN 100 UNIT/ML
500 SYRINGE INTRAVENOUS
Status: CANCELLED | OUTPATIENT
Start: 2021-05-27

## 2021-05-27 RX ORDER — SODIUM CHLORIDE 0.9 % (FLUSH) 0.9 %
10 SYRINGE (ML) INJECTION
Status: CANCELLED | OUTPATIENT
Start: 2021-05-27

## 2021-07-08 ENCOUNTER — OFFICE VISIT (OUTPATIENT)
Dept: HEMATOLOGY/ONCOLOGY | Facility: CLINIC | Age: 56
End: 2021-07-08
Payer: MEDICAID

## 2021-07-08 VITALS
HEART RATE: 99 BPM | BODY MASS INDEX: 23.44 KG/M2 | WEIGHT: 137.31 LBS | RESPIRATION RATE: 16 BRPM | TEMPERATURE: 98 F | OXYGEN SATURATION: 95 % | HEIGHT: 64 IN | DIASTOLIC BLOOD PRESSURE: 83 MMHG | SYSTOLIC BLOOD PRESSURE: 117 MMHG

## 2021-07-08 DIAGNOSIS — G89.3 CANCER RELATED PAIN: ICD-10-CM

## 2021-07-08 DIAGNOSIS — D69.6 THROMBOCYTOPENIA: ICD-10-CM

## 2021-07-08 DIAGNOSIS — C79.51 METASTATIC CANCER TO BONE: ICD-10-CM

## 2021-07-08 DIAGNOSIS — C79.71 MALIGNANT NEOPLASM METASTATIC TO RIGHT ADRENAL GLAND: ICD-10-CM

## 2021-07-08 DIAGNOSIS — C34.92 MALIGNANT NEOPLASM OF LEFT LUNG STAGE 4: Primary | ICD-10-CM

## 2021-07-08 LAB
ALBUMIN SERPL BCP-MCNC: 3.3 G/DL (ref 3.4–5)
ALBUMIN/GLOBULIN RATIO: 0.77 RATIO (ref 1.1–1.8)
ALP SERPL-CCNC: 92 U/L (ref 46–116)
ALT SERPL W P-5'-P-CCNC: 66 U/L (ref 12–78)
ANION GAP SERPL CALC-SCNC: 12 MMOL/L (ref 3–11)
AST SERPL-CCNC: 119 U/L (ref 15–37)
BASOPHILS NFR BLD: 0.5 % (ref 0–3)
BILIRUB SERPL-MCNC: 0.3 MG/DL (ref 0–1)
BUN SERPL-MCNC: 5 MG/DL (ref 7–18)
BUN/CREAT SERPL: 7.69 RATIO (ref 7–18)
CALCIUM SERPL-MCNC: 8.4 MG/DL (ref 8.8–10.5)
CHLORIDE SERPL-SCNC: 107 MMOL/L (ref 100–108)
CO2 SERPL-SCNC: 26 MMOL/L (ref 21–32)
CREAT SERPL-MCNC: 0.65 MG/DL (ref 0.7–1.3)
EOSINOPHIL NFR BLD: 1.9 % (ref 1–3)
ERYTHROCYTE [DISTWIDTH] IN BLOOD BY AUTOMATED COUNT: 15.9 % (ref 12.5–18)
GFR ESTIMATION: > 60
GLOBULIN: 4.3 G/DL (ref 2.3–3.5)
GLUCOSE SERPL-MCNC: 118 MG/DL (ref 70–110)
HCT VFR BLD AUTO: 37.1 % (ref 42–52)
HGB BLD-MCNC: 12.9 G/DL (ref 14–18)
LYMPHOCYTES NFR BLD: 41.5 % (ref 25–40)
MCH RBC QN AUTO: 33.9 PG (ref 27–31.2)
MCHC RBC AUTO-ENTMCNC: 34.8 G/DL (ref 31.8–35.4)
MCV RBC AUTO: 97.6 FL (ref 80–97)
MONOCYTES NFR BLD: 6.2 % (ref 1–15)
NEUTROPHILS # BLD AUTO: 1.81 10*3/UL (ref 1.8–7.7)
NEUTROPHILS NFR BLD: 48.8 % (ref 37–80)
NUCLEATED RED BLOOD CELLS: 0 %
PLATELETS: 56 10*3/UL (ref 142–424)
POTASSIUM SERPL-SCNC: 3.4 MMOL/L (ref 3.6–5.2)
PROT SERPL-MCNC: 7.6 G/DL (ref 6.4–8.2)
RBC # BLD AUTO: 3.8 10*6/UL (ref 4.7–6.1)
SMALL PLATELETS BLD QL SMEAR: NORMAL
SODIUM BLD-SCNC: 145 MMOL/L (ref 135–145)
TSH SERPL DL<=0.005 MIU/L-ACNC: 0.66 UIU/ML (ref 0.36–3.74)
WBC # BLD: 3.7 10*3/UL (ref 4.6–10.2)

## 2021-07-08 PROCEDURE — 99214 PR OFFICE/OUTPT VISIT, EST, LEVL IV, 30-39 MIN: ICD-10-PCS | Mod: S$GLB,,, | Performed by: NURSE PRACTITIONER

## 2021-07-08 PROCEDURE — 99214 OFFICE O/P EST MOD 30 MIN: CPT | Mod: S$GLB,,, | Performed by: NURSE PRACTITIONER

## 2021-07-08 RX ORDER — OXYCODONE AND ACETAMINOPHEN 7.5; 325 MG/1; MG/1
1 TABLET ORAL EVERY 8 HOURS PRN
Qty: 90 TABLET | Refills: 0 | Status: SHIPPED | OUTPATIENT
Start: 2021-07-08 | End: 2021-09-02 | Stop reason: SDUPTHER

## 2021-07-08 RX ORDER — PREDNISONE 20 MG/1
TABLET ORAL
Qty: 28 TABLET | Refills: 0 | Status: SHIPPED | OUTPATIENT
Start: 2021-07-08 | End: 2021-08-02

## 2021-07-22 ENCOUNTER — OFFICE VISIT (OUTPATIENT)
Dept: HEMATOLOGY/ONCOLOGY | Facility: CLINIC | Age: 56
End: 2021-07-22
Payer: MEDICAID

## 2021-07-22 VITALS
TEMPERATURE: 99 F | BODY MASS INDEX: 23.67 KG/M2 | DIASTOLIC BLOOD PRESSURE: 90 MMHG | WEIGHT: 138.63 LBS | OXYGEN SATURATION: 97 % | HEIGHT: 64 IN | RESPIRATION RATE: 18 BRPM | SYSTOLIC BLOOD PRESSURE: 129 MMHG | HEART RATE: 94 BPM

## 2021-07-22 DIAGNOSIS — R19.7 DIARRHEA, UNSPECIFIED TYPE: Primary | ICD-10-CM

## 2021-07-22 DIAGNOSIS — D69.6 THROMBOCYTOPENIA: ICD-10-CM

## 2021-07-22 DIAGNOSIS — C79.71 MALIGNANT NEOPLASM METASTATIC TO RIGHT ADRENAL GLAND: ICD-10-CM

## 2021-07-22 DIAGNOSIS — K52.9 COLITIS WITHOUT COMPLICATION: ICD-10-CM

## 2021-07-22 LAB
ALBUMIN SERPL BCP-MCNC: 3.3 G/DL (ref 3.4–5)
ALBUMIN/GLOBULIN RATIO: 0.73 RATIO (ref 1.1–1.8)
ALP SERPL-CCNC: 62 U/L (ref 46–116)
ALT SERPL W P-5'-P-CCNC: 36 U/L (ref 12–78)
ANION GAP SERPL CALC-SCNC: 5 MMOL/L (ref 3–11)
ANISOCYTOSIS: NORMAL
AST SERPL-CCNC: 39 U/L (ref 15–37)
BASOPHILS NFR BLD: 0.5 % (ref 0–3)
BILIRUB SERPL-MCNC: 0.5 MG/DL (ref 0–1)
BUN SERPL-MCNC: 3 MG/DL (ref 7–18)
BUN/CREAT SERPL: 4.68 RATIO (ref 7–18)
C. DIFFICILE RESULT: NORMAL
CALCIUM SERPL-MCNC: 7.9 MG/DL (ref 8.8–10.5)
CHLORIDE SERPL-SCNC: 101 MMOL/L (ref 100–108)
CO2 SERPL-SCNC: 30 MMOL/L (ref 21–32)
CREAT SERPL-MCNC: 0.64 MG/DL (ref 0.7–1.3)
EOSINOPHIL NFR BLD: 0.9 % (ref 1–3)
ERYTHROCYTE [DISTWIDTH] IN BLOOD BY AUTOMATED COUNT: 19.3 % (ref 12.5–18)
GFR ESTIMATION: > 60
GLOBULIN: 4.5 G/DL (ref 2.3–3.5)
GLUCOSE SERPL-MCNC: 103 MG/DL (ref 70–110)
HCT VFR BLD AUTO: 34.6 % (ref 42–52)
HGB BLD-MCNC: 11.7 G/DL (ref 14–18)
LYMPHOCYTES NFR BLD: 26.8 % (ref 25–40)
MACROCYTES BLD QL SMEAR: NORMAL
MCH RBC QN AUTO: 34.7 PG (ref 27–31.2)
MCHC RBC AUTO-ENTMCNC: 33.8 G/DL (ref 31.8–35.4)
MCV RBC AUTO: 102.7 FL (ref 80–97)
MONOCYTES NFR BLD: 9.5 % (ref 1–15)
NEUTROPHILS # BLD AUTO: 3.96 10*3/UL (ref 1.8–7.7)
NEUTROPHILS NFR BLD: 60.6 % (ref 37–80)
NUCLEATED RED BLOOD CELLS: 0 %
PLATELETS: 112 10*3/UL (ref 142–424)
POTASSIUM SERPL-SCNC: 3 MMOL/L (ref 3.6–5.2)
PROT SERPL-MCNC: 7.8 G/DL (ref 6.4–8.2)
RBC # BLD AUTO: 3.37 10*6/UL (ref 4.7–6.1)
SMALL PLATELETS BLD QL SMEAR: ADEQUATE
SODIUM BLD-SCNC: 136 MMOL/L (ref 135–145)
WBC # BLD: 6.5 10*3/UL (ref 4.6–10.2)

## 2021-07-22 PROCEDURE — 99214 PR OFFICE/OUTPT VISIT, EST, LEVL IV, 30-39 MIN: ICD-10-PCS | Mod: S$GLB,,, | Performed by: NURSE PRACTITIONER

## 2021-07-22 PROCEDURE — 99214 OFFICE O/P EST MOD 30 MIN: CPT | Mod: S$GLB,,, | Performed by: NURSE PRACTITIONER

## 2021-09-02 ENCOUNTER — OFFICE VISIT (OUTPATIENT)
Dept: HEMATOLOGY/ONCOLOGY | Facility: CLINIC | Age: 56
End: 2021-09-02
Payer: MEDICAID

## 2021-09-02 VITALS
DIASTOLIC BLOOD PRESSURE: 79 MMHG | HEIGHT: 64 IN | HEART RATE: 75 BPM | BODY MASS INDEX: 23.9 KG/M2 | TEMPERATURE: 97 F | OXYGEN SATURATION: 95 % | SYSTOLIC BLOOD PRESSURE: 124 MMHG | RESPIRATION RATE: 16 BRPM | WEIGHT: 140 LBS

## 2021-09-02 DIAGNOSIS — Z29.89 ENCOUNTER FOR IMMUNOTHERAPY: ICD-10-CM

## 2021-09-02 DIAGNOSIS — G89.3 CANCER RELATED PAIN: ICD-10-CM

## 2021-09-02 DIAGNOSIS — C34.92 MALIGNANT NEOPLASM OF LEFT LUNG STAGE 4: ICD-10-CM

## 2021-09-02 DIAGNOSIS — C79.51 METASTATIC CANCER TO BONE: ICD-10-CM

## 2021-09-02 DIAGNOSIS — R52 PAIN: Primary | ICD-10-CM

## 2021-09-02 LAB
ALBUMIN SERPL BCP-MCNC: 3.3 G/DL (ref 3.4–5)
ALBUMIN/GLOBULIN RATIO: 0.79 RATIO (ref 1.1–1.8)
ALP SERPL-CCNC: 69 U/L (ref 46–116)
ALT SERPL W P-5'-P-CCNC: 36 U/L (ref 12–78)
ANION GAP SERPL CALC-SCNC: 9 MMOL/L (ref 3–11)
ANISOCYTOSIS: NORMAL
AST SERPL-CCNC: 33 U/L (ref 15–37)
BASOPHILS NFR BLD: 0.3 % (ref 0–3)
BILIRUB SERPL-MCNC: 0.4 MG/DL (ref 0–1)
BUN SERPL-MCNC: 9 MG/DL (ref 7–18)
BUN/CREAT SERPL: 9.89 RATIO (ref 7–18)
CALCIUM SERPL-MCNC: 8.8 MG/DL (ref 8.8–10.5)
CHLORIDE SERPL-SCNC: 105 MMOL/L (ref 100–108)
CO2 SERPL-SCNC: 26 MMOL/L (ref 21–32)
CREAT SERPL-MCNC: 0.91 MG/DL (ref 0.7–1.3)
EOSINOPHIL NFR BLD: 1.9 % (ref 1–3)
ERYTHROCYTE [DISTWIDTH] IN BLOOD BY AUTOMATED COUNT: 14.6 % (ref 12.5–18)
GFR ESTIMATION: > 60
GLOBULIN: 4.2 G/DL (ref 2.3–3.5)
GLUCOSE SERPL-MCNC: 119 MG/DL (ref 70–110)
HCT VFR BLD AUTO: 37.5 % (ref 42–52)
HGB BLD-MCNC: 12.4 G/DL (ref 14–18)
LYMPHOCYTES NFR BLD: 30.5 % (ref 25–40)
MACROCYTES BLD QL SMEAR: NORMAL
MCH RBC QN AUTO: 36 PG (ref 27–31.2)
MCHC RBC AUTO-ENTMCNC: 33.1 G/DL (ref 31.8–35.4)
MCV RBC AUTO: 109 FL (ref 80–97)
MONOCYTES NFR BLD: 10.9 % (ref 1–15)
NEUTROPHILS # BLD AUTO: 3.59 10*3/UL (ref 1.8–7.7)
NEUTROPHILS NFR BLD: 55.9 % (ref 37–80)
NUCLEATED RED BLOOD CELLS: 0 %
PLATELETS: 135 10*3/UL (ref 142–424)
POTASSIUM SERPL-SCNC: 4.2 MMOL/L (ref 3.6–5.2)
PROT SERPL-MCNC: 7.5 G/DL (ref 6.4–8.2)
RBC # BLD AUTO: 3.44 10*6/UL (ref 4.7–6.1)
SODIUM BLD-SCNC: 140 MMOL/L (ref 135–145)
TSH SERPL DL<=0.005 MIU/L-ACNC: 0.03 UIU/ML (ref 0.36–3.74)
WBC # BLD: 6.4 10*3/UL (ref 4.6–10.2)

## 2021-09-02 PROCEDURE — 99214 PR OFFICE/OUTPT VISIT, EST, LEVL IV, 30-39 MIN: ICD-10-PCS | Mod: S$GLB,,, | Performed by: NURSE PRACTITIONER

## 2021-09-02 PROCEDURE — 99214 OFFICE O/P EST MOD 30 MIN: CPT | Mod: S$GLB,,, | Performed by: NURSE PRACTITIONER

## 2021-09-02 RX ORDER — HEPARIN 100 UNIT/ML
500 SYRINGE INTRAVENOUS
Status: CANCELLED | OUTPATIENT
Start: 2021-09-02

## 2021-09-02 RX ORDER — OXYCODONE AND ACETAMINOPHEN 7.5; 325 MG/1; MG/1
1 TABLET ORAL EVERY 8 HOURS PRN
Qty: 90 TABLET | Refills: 0 | Status: SHIPPED | OUTPATIENT
Start: 2021-09-02 | End: 2021-10-14 | Stop reason: SDUPTHER

## 2021-09-02 RX ORDER — SODIUM CHLORIDE 0.9 % (FLUSH) 0.9 %
10 SYRINGE (ML) INJECTION
Status: CANCELLED | OUTPATIENT
Start: 2021-09-02

## 2021-09-02 RX ORDER — LEVOTHYROXINE SODIUM 200 UG/1
200 TABLET ORAL DAILY
Qty: 30 TABLET | Refills: 11 | Status: SHIPPED | OUTPATIENT
Start: 2021-09-02 | End: 2022-03-31

## 2021-09-03 ENCOUNTER — TELEPHONE (OUTPATIENT)
Dept: HEMATOLOGY/ONCOLOGY | Facility: CLINIC | Age: 56
End: 2021-09-03

## 2021-10-14 DIAGNOSIS — C34.92 MALIGNANT NEOPLASM OF LEFT LUNG STAGE 4: ICD-10-CM

## 2021-10-14 DIAGNOSIS — C79.51 METASTATIC CANCER TO BONE: ICD-10-CM

## 2021-10-14 DIAGNOSIS — G89.3 CANCER RELATED PAIN: ICD-10-CM

## 2021-10-14 LAB
ALBUMIN SERPL BCP-MCNC: 3.9 G/DL (ref 3.4–5)
ALBUMIN/GLOBULIN RATIO: 0.81 RATIO (ref 1.1–1.8)
ALP SERPL-CCNC: 83 U/L (ref 46–116)
ALT SERPL W P-5'-P-CCNC: 110 U/L (ref 12–78)
ANION GAP SERPL CALC-SCNC: 15 MMOL/L (ref 3–11)
ANISOCYTOSIS: NORMAL
AST SERPL-CCNC: 232 U/L (ref 15–37)
BASOPHILS NFR BLD: 0.8 % (ref 0–3)
BILIRUB SERPL-MCNC: 0.4 MG/DL (ref 0–1)
BUN SERPL-MCNC: 8 MG/DL (ref 7–18)
BUN/CREAT SERPL: 9.75 RATIO (ref 7–18)
CALCIUM SERPL-MCNC: 8.5 MG/DL (ref 8.8–10.5)
CHLORIDE SERPL-SCNC: 101 MMOL/L (ref 100–108)
CO2 SERPL-SCNC: 23 MMOL/L (ref 21–32)
CREAT SERPL-MCNC: 0.82 MG/DL (ref 0.7–1.3)
EOSINOPHIL NFR BLD: 3 % (ref 1–3)
ERYTHROCYTE [DISTWIDTH] IN BLOOD BY AUTOMATED COUNT: 13.8 % (ref 12.5–18)
GFR ESTIMATION: > 60
GLOBULIN: 4.8 G/DL (ref 2.3–3.5)
GLUCOSE SERPL-MCNC: 93 MG/DL (ref 70–110)
HCT VFR BLD AUTO: 42.6 % (ref 42–52)
HGB BLD-MCNC: 15.1 G/DL (ref 14–18)
LYMPHOCYTES NFR BLD: 52.5 % (ref 25–40)
MACROCYTES BLD QL SMEAR: NORMAL
MCH RBC QN AUTO: 35.6 PG (ref 27–31.2)
MCHC RBC AUTO-ENTMCNC: 35.4 G/DL (ref 31.8–35.4)
MCV RBC AUTO: 100.5 FL (ref 80–97)
MONOCYTES NFR BLD: 8.3 % (ref 1–15)
NEUTROPHILS # BLD AUTO: 1.41 10*3/UL (ref 1.8–7.7)
NEUTROPHILS NFR BLD: 35.1 % (ref 37–80)
NUCLEATED RED BLOOD CELLS: 0 %
PLATELETS: 55 10*3/UL (ref 142–424)
POTASSIUM SERPL-SCNC: 3.7 MMOL/L (ref 3.6–5.2)
PROT SERPL-MCNC: 8.7 G/DL (ref 6.4–8.2)
RBC # BLD AUTO: 4.24 10*6/UL (ref 4.7–6.1)
SMALL PLATELETS BLD QL SMEAR: NORMAL
SODIUM BLD-SCNC: 139 MMOL/L (ref 135–145)
WBC # BLD: 4 10*3/UL (ref 4.6–10.2)

## 2021-10-14 RX ORDER — OXYCODONE AND ACETAMINOPHEN 7.5; 325 MG/1; MG/1
1 TABLET ORAL EVERY 8 HOURS PRN
Qty: 90 TABLET | Refills: 0 | Status: SHIPPED | OUTPATIENT
Start: 2021-10-14 | End: 2021-11-11 | Stop reason: SDUPTHER

## 2021-10-21 ENCOUNTER — TELEPHONE (OUTPATIENT)
Dept: HEMATOLOGY/ONCOLOGY | Facility: CLINIC | Age: 56
End: 2021-10-21

## 2021-11-11 ENCOUNTER — OFFICE VISIT (OUTPATIENT)
Dept: HEMATOLOGY/ONCOLOGY | Facility: CLINIC | Age: 56
End: 2021-11-11
Payer: MEDICAID

## 2021-11-11 VITALS
WEIGHT: 147 LBS | TEMPERATURE: 98 F | HEIGHT: 64 IN | OXYGEN SATURATION: 96 % | SYSTOLIC BLOOD PRESSURE: 148 MMHG | DIASTOLIC BLOOD PRESSURE: 89 MMHG | RESPIRATION RATE: 18 BRPM | HEART RATE: 85 BPM | BODY MASS INDEX: 25.1 KG/M2

## 2021-11-11 DIAGNOSIS — M25.559 HIP PAIN: Primary | ICD-10-CM

## 2021-11-11 DIAGNOSIS — C34.92 MALIGNANT NEOPLASM OF LEFT LUNG STAGE 4: ICD-10-CM

## 2021-11-11 DIAGNOSIS — G89.3 CANCER RELATED PAIN: ICD-10-CM

## 2021-11-11 DIAGNOSIS — C79.51 METASTATIC CANCER TO BONE: ICD-10-CM

## 2021-11-11 DIAGNOSIS — C79.71 MALIGNANT NEOPLASM METASTATIC TO RIGHT ADRENAL GLAND: ICD-10-CM

## 2021-11-11 LAB
ALBUMIN SERPL BCP-MCNC: 3.7 G/DL (ref 3.4–5)
ALBUMIN/GLOBULIN RATIO: 0.95 RATIO (ref 1.1–1.8)
ALP SERPL-CCNC: 89 U/L (ref 46–116)
ALT SERPL W P-5'-P-CCNC: 113 U/L (ref 12–78)
ANION GAP SERPL CALC-SCNC: 8 MMOL/L (ref 3–11)
AST SERPL-CCNC: 154 U/L (ref 15–37)
BASOPHILS NFR BLD: 0.9 % (ref 0–3)
BILIRUB SERPL-MCNC: 0.3 MG/DL (ref 0–1)
BUN SERPL-MCNC: 7 MG/DL (ref 7–18)
BUN/CREAT SERPL: 10 RATIO (ref 7–18)
CALCIUM SERPL-MCNC: 8.8 MG/DL (ref 8.8–10.5)
CHLORIDE SERPL-SCNC: 98 MMOL/L (ref 100–108)
CO2 SERPL-SCNC: 28 MMOL/L (ref 21–32)
CREAT SERPL-MCNC: 0.7 MG/DL (ref 0.7–1.3)
EOSINOPHIL NFR BLD: 1.2 % (ref 1–3)
ERYTHROCYTE [DISTWIDTH] IN BLOOD BY AUTOMATED COUNT: 15 % (ref 12.5–18)
GFR ESTIMATION: > 60
GLOBULIN: 3.9 G/DL (ref 2.3–3.5)
GLUCOSE SERPL-MCNC: 99 MG/DL (ref 70–110)
HCT VFR BLD AUTO: 38.3 % (ref 42–52)
HGB BLD-MCNC: 12.9 G/DL (ref 14–18)
LYMPHOCYTES NFR BLD: 28 % (ref 25–40)
MACROCYTES BLD QL SMEAR: NORMAL
MCH RBC QN AUTO: 35.2 PG (ref 27–31.2)
MCHC RBC AUTO-ENTMCNC: 33.7 G/DL (ref 31.8–35.4)
MCV RBC AUTO: 104.6 FL (ref 80–97)
MONOCYTES NFR BLD: 6.7 % (ref 1–15)
NEUTROPHILS # BLD AUTO: 3.55 10*3/UL (ref 1.8–7.7)
NEUTROPHILS NFR BLD: 62.1 % (ref 37–80)
NUCLEATED RED BLOOD CELLS: 0 %
PLATELETS: 73 10*3/UL (ref 142–424)
POTASSIUM SERPL-SCNC: 3.9 MMOL/L (ref 3.6–5.2)
PROT SERPL-MCNC: 7.6 G/DL (ref 6.4–8.2)
RBC # BLD AUTO: 3.66 10*6/UL (ref 4.7–6.1)
SMALL PLATELETS BLD QL SMEAR: NORMAL
SODIUM BLD-SCNC: 134 MMOL/L (ref 135–145)
TSH SERPL DL<=0.005 MIU/L-ACNC: 84.11 UIU/ML (ref 0.36–3.74)
WBC # BLD: 5.7 10*3/UL (ref 4.6–10.2)

## 2021-11-11 PROCEDURE — 99214 PR OFFICE/OUTPT VISIT, EST, LEVL IV, 30-39 MIN: ICD-10-PCS | Mod: S$GLB,,, | Performed by: NURSE PRACTITIONER

## 2021-11-11 PROCEDURE — 99214 OFFICE O/P EST MOD 30 MIN: CPT | Mod: S$GLB,,, | Performed by: NURSE PRACTITIONER

## 2021-11-11 RX ORDER — OXYCODONE AND ACETAMINOPHEN 7.5; 325 MG/1; MG/1
1 TABLET ORAL EVERY 8 HOURS PRN
Qty: 90 TABLET | Refills: 0 | Status: SHIPPED | OUTPATIENT
Start: 2021-11-11 | End: 2021-12-30 | Stop reason: SDUPTHER

## 2021-11-12 ENCOUNTER — TELEPHONE (OUTPATIENT)
Dept: HEMATOLOGY/ONCOLOGY | Facility: CLINIC | Age: 56
End: 2021-11-12
Payer: MEDICAID

## 2021-11-18 RX ORDER — HEPARIN 100 UNIT/ML
500 SYRINGE INTRAVENOUS
Status: CANCELLED | OUTPATIENT
Start: 2021-11-18

## 2021-11-18 RX ORDER — SODIUM CHLORIDE 0.9 % (FLUSH) 0.9 %
10 SYRINGE (ML) INJECTION
Status: CANCELLED | OUTPATIENT
Start: 2021-11-18

## 2021-12-07 ENCOUNTER — TELEPHONE (OUTPATIENT)
Dept: HEMATOLOGY/ONCOLOGY | Facility: CLINIC | Age: 56
End: 2021-12-07
Payer: MEDICAID

## 2021-12-10 ENCOUNTER — TELEPHONE (OUTPATIENT)
Dept: HEMATOLOGY/ONCOLOGY | Facility: CLINIC | Age: 56
End: 2021-12-10
Payer: MEDICAID

## 2021-12-30 ENCOUNTER — OFFICE VISIT (OUTPATIENT)
Dept: HEMATOLOGY/ONCOLOGY | Facility: CLINIC | Age: 56
End: 2021-12-30
Payer: MEDICAID

## 2021-12-30 ENCOUNTER — TELEPHONE (OUTPATIENT)
Dept: HEMATOLOGY/ONCOLOGY | Facility: CLINIC | Age: 56
End: 2021-12-30

## 2021-12-30 VITALS
TEMPERATURE: 98 F | SYSTOLIC BLOOD PRESSURE: 150 MMHG | BODY MASS INDEX: 24.61 KG/M2 | OXYGEN SATURATION: 97 % | RESPIRATION RATE: 16 BRPM | WEIGHT: 144.13 LBS | HEIGHT: 64 IN | DIASTOLIC BLOOD PRESSURE: 95 MMHG | HEART RATE: 86 BPM

## 2021-12-30 DIAGNOSIS — C79.51 METASTATIC CANCER TO BONE: ICD-10-CM

## 2021-12-30 DIAGNOSIS — C79.71 MALIGNANT NEOPLASM METASTATIC TO RIGHT ADRENAL GLAND: Primary | ICD-10-CM

## 2021-12-30 DIAGNOSIS — C34.92 MALIGNANT NEOPLASM OF LEFT LUNG STAGE 4: ICD-10-CM

## 2021-12-30 DIAGNOSIS — G89.3 CANCER RELATED PAIN: ICD-10-CM

## 2021-12-30 PROCEDURE — 3077F PR MOST RECENT SYSTOLIC BLOOD PRESSURE >= 140 MM HG: ICD-10-PCS | Mod: CPTII,S$GLB,, | Performed by: NURSE PRACTITIONER

## 2021-12-30 PROCEDURE — 99214 OFFICE O/P EST MOD 30 MIN: CPT | Mod: S$GLB,,, | Performed by: NURSE PRACTITIONER

## 2021-12-30 PROCEDURE — 1159F PR MEDICATION LIST DOCUMENTED IN MEDICAL RECORD: ICD-10-PCS | Mod: CPTII,S$GLB,, | Performed by: NURSE PRACTITIONER

## 2021-12-30 PROCEDURE — 3077F SYST BP >= 140 MM HG: CPT | Mod: CPTII,S$GLB,, | Performed by: NURSE PRACTITIONER

## 2021-12-30 PROCEDURE — 1160F PR REVIEW ALL MEDS BY PRESCRIBER/CLIN PHARMACIST DOCUMENTED: ICD-10-PCS | Mod: CPTII,S$GLB,, | Performed by: NURSE PRACTITIONER

## 2021-12-30 PROCEDURE — 3008F PR BODY MASS INDEX (BMI) DOCUMENTED: ICD-10-PCS | Mod: CPTII,S$GLB,, | Performed by: NURSE PRACTITIONER

## 2021-12-30 PROCEDURE — 3008F BODY MASS INDEX DOCD: CPT | Mod: CPTII,S$GLB,, | Performed by: NURSE PRACTITIONER

## 2021-12-30 PROCEDURE — 99214 PR OFFICE/OUTPT VISIT, EST, LEVL IV, 30-39 MIN: ICD-10-PCS | Mod: S$GLB,,, | Performed by: NURSE PRACTITIONER

## 2021-12-30 PROCEDURE — 1159F MED LIST DOCD IN RCRD: CPT | Mod: CPTII,S$GLB,, | Performed by: NURSE PRACTITIONER

## 2021-12-30 PROCEDURE — 3080F DIAST BP >= 90 MM HG: CPT | Mod: CPTII,S$GLB,, | Performed by: NURSE PRACTITIONER

## 2021-12-30 PROCEDURE — 3080F PR MOST RECENT DIASTOLIC BLOOD PRESSURE >= 90 MM HG: ICD-10-PCS | Mod: CPTII,S$GLB,, | Performed by: NURSE PRACTITIONER

## 2021-12-30 PROCEDURE — 1160F RVW MEDS BY RX/DR IN RCRD: CPT | Mod: CPTII,S$GLB,, | Performed by: NURSE PRACTITIONER

## 2021-12-30 RX ORDER — HEPARIN 100 UNIT/ML
500 SYRINGE INTRAVENOUS
Status: CANCELLED | OUTPATIENT
Start: 2021-12-30

## 2021-12-30 RX ORDER — SODIUM CHLORIDE 0.9 % (FLUSH) 0.9 %
10 SYRINGE (ML) INJECTION
Status: CANCELLED | OUTPATIENT
Start: 2021-12-30

## 2021-12-30 RX ORDER — OXYCODONE AND ACETAMINOPHEN 7.5; 325 MG/1; MG/1
1 TABLET ORAL EVERY 8 HOURS PRN
Qty: 90 TABLET | Refills: 0 | Status: SHIPPED | OUTPATIENT
Start: 2021-12-30 | End: 2022-02-10 | Stop reason: SDUPTHER

## 2022-01-20 NOTE — TELEPHONE ENCOUNTER
Review of scans today, show recurrence of met disease. As previously disucssed with pt, will plan to resume IO therapy.    Admission

## 2022-02-02 ENCOUNTER — TELEPHONE (OUTPATIENT)
Dept: HEMATOLOGY/ONCOLOGY | Facility: CLINIC | Age: 57
End: 2022-02-02
Payer: MEDICAID

## 2022-02-02 NOTE — TELEPHONE ENCOUNTER
Spoke to Tia with patients insurance. Scheduled transportation for 2/10/22 trip number is [id number] and patient will need to call [phone] when ready to be picked up to go home. TTRN

## 2022-02-02 NOTE — TELEPHONE ENCOUNTER
[place] patient to see if he needed transportation set up for next appointments. Patient did not answer. Left a VM. TTRN

## 2022-02-03 ENCOUNTER — TELEPHONE (OUTPATIENT)
Dept: HEMATOLOGY/ONCOLOGY | Facility: CLINIC | Age: 57
End: 2022-02-03
Payer: MEDICAID

## 2022-02-03 NOTE — TELEPHONE ENCOUNTER
Spoke to the patient trip number and telephone number for transportation pickup provided. Patient states understanding. TTRN

## 2022-02-10 ENCOUNTER — TELEPHONE (OUTPATIENT)
Dept: HEMATOLOGY/ONCOLOGY | Facility: CLINIC | Age: 57
End: 2022-02-10

## 2022-02-10 ENCOUNTER — OFFICE VISIT (OUTPATIENT)
Dept: HEMATOLOGY/ONCOLOGY | Facility: CLINIC | Age: 57
End: 2022-02-10
Payer: MEDICAID

## 2022-02-10 VITALS
WEIGHT: 146.63 LBS | SYSTOLIC BLOOD PRESSURE: 144 MMHG | HEART RATE: 84 BPM | TEMPERATURE: 98 F | HEIGHT: 64 IN | RESPIRATION RATE: 16 BRPM | OXYGEN SATURATION: 95 % | BODY MASS INDEX: 25.03 KG/M2 | DIASTOLIC BLOOD PRESSURE: 91 MMHG

## 2022-02-10 DIAGNOSIS — G89.3 CANCER RELATED PAIN: ICD-10-CM

## 2022-02-10 DIAGNOSIS — C79.51 METASTATIC CANCER TO BONE: ICD-10-CM

## 2022-02-10 DIAGNOSIS — C79.71 MALIGNANT NEOPLASM METASTATIC TO RIGHT ADRENAL GLAND: ICD-10-CM

## 2022-02-10 DIAGNOSIS — C34.92 MALIGNANT NEOPLASM OF LEFT LUNG STAGE 4: Primary | ICD-10-CM

## 2022-02-10 PROCEDURE — 99214 OFFICE O/P EST MOD 30 MIN: CPT | Mod: S$GLB,,, | Performed by: NURSE PRACTITIONER

## 2022-02-10 PROCEDURE — 1160F RVW MEDS BY RX/DR IN RCRD: CPT | Mod: CPTII,S$GLB,, | Performed by: NURSE PRACTITIONER

## 2022-02-10 PROCEDURE — 1159F PR MEDICATION LIST DOCUMENTED IN MEDICAL RECORD: ICD-10-PCS | Mod: CPTII,S$GLB,, | Performed by: NURSE PRACTITIONER

## 2022-02-10 PROCEDURE — 3008F PR BODY MASS INDEX (BMI) DOCUMENTED: ICD-10-PCS | Mod: CPTII,S$GLB,, | Performed by: NURSE PRACTITIONER

## 2022-02-10 PROCEDURE — 3077F SYST BP >= 140 MM HG: CPT | Mod: CPTII,S$GLB,, | Performed by: NURSE PRACTITIONER

## 2022-02-10 PROCEDURE — 3008F BODY MASS INDEX DOCD: CPT | Mod: CPTII,S$GLB,, | Performed by: NURSE PRACTITIONER

## 2022-02-10 PROCEDURE — 1159F MED LIST DOCD IN RCRD: CPT | Mod: CPTII,S$GLB,, | Performed by: NURSE PRACTITIONER

## 2022-02-10 PROCEDURE — 3077F PR MOST RECENT SYSTOLIC BLOOD PRESSURE >= 140 MM HG: ICD-10-PCS | Mod: CPTII,S$GLB,, | Performed by: NURSE PRACTITIONER

## 2022-02-10 PROCEDURE — 1160F PR REVIEW ALL MEDS BY PRESCRIBER/CLIN PHARMACIST DOCUMENTED: ICD-10-PCS | Mod: CPTII,S$GLB,, | Performed by: NURSE PRACTITIONER

## 2022-02-10 PROCEDURE — 3080F DIAST BP >= 90 MM HG: CPT | Mod: CPTII,S$GLB,, | Performed by: NURSE PRACTITIONER

## 2022-02-10 PROCEDURE — 3080F PR MOST RECENT DIASTOLIC BLOOD PRESSURE >= 90 MM HG: ICD-10-PCS | Mod: CPTII,S$GLB,, | Performed by: NURSE PRACTITIONER

## 2022-02-10 PROCEDURE — 99214 PR OFFICE/OUTPT VISIT, EST, LEVL IV, 30-39 MIN: ICD-10-PCS | Mod: S$GLB,,, | Performed by: NURSE PRACTITIONER

## 2022-02-10 RX ORDER — OXYCODONE AND ACETAMINOPHEN 7.5; 325 MG/1; MG/1
1 TABLET ORAL EVERY 8 HOURS PRN
Qty: 90 TABLET | Refills: 0 | Status: SHIPPED | OUTPATIENT
Start: 2022-02-10 | End: 2022-03-24 | Stop reason: SDUPTHER

## 2022-02-10 RX ORDER — HEPARIN 100 UNIT/ML
500 SYRINGE INTRAVENOUS
Status: CANCELLED | OUTPATIENT
Start: 2022-02-10

## 2022-02-10 RX ORDER — SODIUM CHLORIDE 0.9 % (FLUSH) 0.9 %
10 SYRINGE (ML) INJECTION
Status: CANCELLED | OUTPATIENT
Start: 2022-02-10
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
## 2022-03-16 ENCOUNTER — TELEPHONE (OUTPATIENT)
Dept: HEMATOLOGY/ONCOLOGY | Facility: CLINIC | Age: 57
End: 2022-03-16
Payer: MEDICAID

## 2022-03-16 NOTE — TELEPHONE ENCOUNTER
Patient states he will call the insurance company and discuss address as well as transportation. Telephone number provided. I let him know that if he needs any assistance to let me know. Patient states understanding. TTRN

## 2022-03-16 NOTE — TELEPHONE ENCOUNTER
Spoke to [employer] to attempt to assist the patient in setting up transportation. D/t the patients address being different they cannot set up appointment. I called the patient job verify that the address on file is correct he states it is. I let him know that I need his old address for [employer] to move forward with transportation. Patient states he does not know it. He states he will contact his sister to find out then let us know. He understands that I will need that information in order to set up transportation. TTRn

## 2022-03-22 DIAGNOSIS — E03.9 HYPOTHYROIDISM, UNSPECIFIED TYPE: ICD-10-CM

## 2022-03-22 DIAGNOSIS — C79.71 MALIGNANT NEOPLASM METASTATIC TO RIGHT ADRENAL GLAND: Primary | ICD-10-CM

## 2022-03-24 ENCOUNTER — OFFICE VISIT (OUTPATIENT)
Dept: HEMATOLOGY/ONCOLOGY | Facility: CLINIC | Age: 57
End: 2022-03-24
Payer: MEDICAID

## 2022-03-24 ENCOUNTER — CLINICAL SUPPORT (OUTPATIENT)
Dept: HEMATOLOGY/ONCOLOGY | Facility: CLINIC | Age: 57
End: 2022-03-24
Payer: MEDICAID

## 2022-03-24 ENCOUNTER — TELEPHONE (OUTPATIENT)
Dept: HEMATOLOGY/ONCOLOGY | Facility: CLINIC | Age: 57
End: 2022-03-24

## 2022-03-24 VITALS
WEIGHT: 147.31 LBS | SYSTOLIC BLOOD PRESSURE: 134 MMHG | HEART RATE: 89 BPM | DIASTOLIC BLOOD PRESSURE: 90 MMHG | TEMPERATURE: 97 F | BODY MASS INDEX: 25.15 KG/M2 | OXYGEN SATURATION: 97 % | HEIGHT: 64 IN | RESPIRATION RATE: 18 BRPM

## 2022-03-24 DIAGNOSIS — C34.92 MALIGNANT NEOPLASM OF LEFT LUNG STAGE 4: ICD-10-CM

## 2022-03-24 DIAGNOSIS — C79.51 METASTATIC CANCER TO BONE: ICD-10-CM

## 2022-03-24 DIAGNOSIS — C79.71 MALIGNANT NEOPLASM METASTATIC TO RIGHT ADRENAL GLAND: ICD-10-CM

## 2022-03-24 DIAGNOSIS — M54.9 DORSALGIA, UNSPECIFIED: ICD-10-CM

## 2022-03-24 DIAGNOSIS — E03.9 HYPOTHYROIDISM, UNSPECIFIED TYPE: ICD-10-CM

## 2022-03-24 DIAGNOSIS — G89.3 CANCER RELATED PAIN: ICD-10-CM

## 2022-03-24 LAB
ALBUMIN SERPL BCP-MCNC: 3.6 G/DL (ref 3.4–5)
ALBUMIN/GLOBULIN RATIO: 0.86 RATIO (ref 1.1–1.8)
ALP SERPL-CCNC: 80 U/L (ref 46–116)
ALT SERPL W P-5'-P-CCNC: 126 U/L (ref 12–78)
ANION GAP SERPL CALC-SCNC: 14 MMOL/L (ref 3–11)
AST SERPL-CCNC: 182 U/L (ref 15–37)
BANDS: 5 % (ref 0–5)
BILIRUB SERPL-MCNC: 0.6 MG/DL (ref 0–1)
BUN SERPL-MCNC: 3 MG/DL (ref 7–18)
BUN/CREAT SERPL: 2.91 RATIO (ref 7–18)
CALCIUM SERPL-MCNC: 8.1 MG/DL (ref 8.8–10.5)
CELLS COUNTED: 100
CHLORIDE SERPL-SCNC: 97 MMOL/L (ref 100–108)
CO2 SERPL-SCNC: 21 MMOL/L (ref 21–32)
CREAT SERPL-MCNC: 1.03 MG/DL (ref 0.7–1.3)
ERYTHROCYTE [DISTWIDTH] IN BLOOD BY AUTOMATED COUNT: 14.6 % (ref 12.5–18)
GFR ESTIMATION: > 60
GLOBULIN: 4.2 G/DL (ref 2.3–3.5)
GLUCOSE SERPL-MCNC: 134 MG/DL (ref 70–110)
HCT VFR BLD AUTO: 45.4 % (ref 42–52)
HGB BLD-MCNC: 15.4 G/DL (ref 14–18)
LYMPHOCYTES NFR BLD: 33 % (ref 25–40)
MCH RBC QN AUTO: 33.3 PG (ref 27–31.2)
MCHC RBC AUTO-ENTMCNC: 33.9 G/DL (ref 31.8–35.4)
MCV RBC AUTO: 98.1 FL (ref 80–97)
MONOCYTES NFR BLD: 4 % (ref 1–15)
NEUTROPHILS # BLD AUTO: 2.8 10*3/UL (ref 1.8–7.7)
NEUTROPHILS NFR BLD: 58 % (ref 37–80)
NUCLEATED RED BLOOD CELLS: 0 %
PLATELETS: 65 10*3/UL (ref 142–424)
POTASSIUM SERPL-SCNC: 3.2 MMOL/L (ref 3.6–5.2)
PROT SERPL-MCNC: 7.8 G/DL (ref 6.4–8.2)
RBC # BLD AUTO: 4.63 10*6/UL (ref 4.7–6.1)
RBC MORPH BLD: NORMAL
SMALL PLATELETS BLD QL SMEAR: NORMAL
SODIUM BLD-SCNC: 132 MMOL/L (ref 135–145)
TSH SERPL DL<=0.005 MIU/L-ACNC: 95.45 UIU/ML (ref 0.36–3.74)
WBC # BLD: 4.5 10*3/UL (ref 4.6–10.2)

## 2022-03-24 PROCEDURE — 3075F PR MOST RECENT SYSTOLIC BLOOD PRESS GE 130-139MM HG: ICD-10-PCS | Mod: CPTII,S$GLB,, | Performed by: NURSE PRACTITIONER

## 2022-03-24 PROCEDURE — 1160F RVW MEDS BY RX/DR IN RCRD: CPT | Mod: CPTII,S$GLB,, | Performed by: NURSE PRACTITIONER

## 2022-03-24 PROCEDURE — 3080F DIAST BP >= 90 MM HG: CPT | Mod: CPTII,S$GLB,, | Performed by: NURSE PRACTITIONER

## 2022-03-24 PROCEDURE — 99214 PR OFFICE/OUTPT VISIT, EST, LEVL IV, 30-39 MIN: ICD-10-PCS | Mod: S$GLB,,, | Performed by: NURSE PRACTITIONER

## 2022-03-24 PROCEDURE — 3008F PR BODY MASS INDEX (BMI) DOCUMENTED: ICD-10-PCS | Mod: CPTII,S$GLB,, | Performed by: NURSE PRACTITIONER

## 2022-03-24 PROCEDURE — 3080F PR MOST RECENT DIASTOLIC BLOOD PRESSURE >= 90 MM HG: ICD-10-PCS | Mod: CPTII,S$GLB,, | Performed by: NURSE PRACTITIONER

## 2022-03-24 PROCEDURE — 3008F BODY MASS INDEX DOCD: CPT | Mod: CPTII,S$GLB,, | Performed by: NURSE PRACTITIONER

## 2022-03-24 PROCEDURE — 3075F SYST BP GE 130 - 139MM HG: CPT | Mod: CPTII,S$GLB,, | Performed by: NURSE PRACTITIONER

## 2022-03-24 PROCEDURE — 1160F PR REVIEW ALL MEDS BY PRESCRIBER/CLIN PHARMACIST DOCUMENTED: ICD-10-PCS | Mod: CPTII,S$GLB,, | Performed by: NURSE PRACTITIONER

## 2022-03-24 PROCEDURE — 1159F PR MEDICATION LIST DOCUMENTED IN MEDICAL RECORD: ICD-10-PCS | Mod: CPTII,S$GLB,, | Performed by: NURSE PRACTITIONER

## 2022-03-24 PROCEDURE — 1159F MED LIST DOCD IN RCRD: CPT | Mod: CPTII,S$GLB,, | Performed by: NURSE PRACTITIONER

## 2022-03-24 PROCEDURE — 99214 OFFICE O/P EST MOD 30 MIN: CPT | Mod: S$GLB,,, | Performed by: NURSE PRACTITIONER

## 2022-03-24 RX ORDER — OXYCODONE AND ACETAMINOPHEN 7.5; 325 MG/1; MG/1
1 TABLET ORAL EVERY 8 HOURS PRN
Qty: 90 TABLET | Refills: 0 | Status: SHIPPED | OUTPATIENT
Start: 2022-03-24 | End: 2022-05-12 | Stop reason: SDUPTHER
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
## 2022-03-25 DIAGNOSIS — C79.71 MALIGNANT NEOPLASM METASTATIC TO RIGHT ADRENAL GLAND: Primary | ICD-10-CM

## 2022-03-31 ENCOUNTER — OFFICE VISIT (OUTPATIENT)
Dept: HEMATOLOGY/ONCOLOGY | Facility: CLINIC | Age: 57
End: 2022-03-31

## 2022-03-31 ENCOUNTER — CLINICAL SUPPORT (OUTPATIENT)
Dept: HEMATOLOGY/ONCOLOGY | Facility: CLINIC | Age: 57
End: 2022-03-31
Payer: MEDICAID

## 2022-03-31 VITALS
BODY MASS INDEX: 25.59 KG/M2 | OXYGEN SATURATION: 95 % | RESPIRATION RATE: 18 BRPM | SYSTOLIC BLOOD PRESSURE: 123 MMHG | HEIGHT: 64 IN | WEIGHT: 149.88 LBS | TEMPERATURE: 97 F | HEART RATE: 80 BPM | DIASTOLIC BLOOD PRESSURE: 82 MMHG

## 2022-03-31 DIAGNOSIS — C79.71 MALIGNANT NEOPLASM METASTATIC TO RIGHT ADRENAL GLAND: ICD-10-CM

## 2022-03-31 DIAGNOSIS — C34.92 MALIGNANT NEOPLASM OF LEFT LUNG STAGE 4: ICD-10-CM

## 2022-03-31 DIAGNOSIS — C79.51 METASTATIC CANCER TO BONE: ICD-10-CM

## 2022-03-31 DIAGNOSIS — E03.9 HYPOTHYROIDISM, UNSPECIFIED TYPE: Primary | ICD-10-CM

## 2022-03-31 LAB
ALBUMIN SERPL BCP-MCNC: 3.6 G/DL (ref 3.4–5)
ALBUMIN/GLOBULIN RATIO: 0.9 RATIO (ref 1.1–1.8)
ALP SERPL-CCNC: 74 U/L (ref 46–116)
ALT SERPL W P-5'-P-CCNC: 32 U/L (ref 12–78)
ANION GAP SERPL CALC-SCNC: 7 MMOL/L (ref 3–11)
AST SERPL-CCNC: 29 U/L (ref 15–37)
BASOPHILS NFR BLD: 0.3 % (ref 0–3)
BILIRUB SERPL-MCNC: 0.5 MG/DL (ref 0–1)
BUN SERPL-MCNC: 8 MG/DL (ref 7–18)
BUN/CREAT SERPL: 9.3 RATIO (ref 7–18)
CALCIUM SERPL-MCNC: 8.8 MG/DL (ref 8.8–10.5)
CHLORIDE SERPL-SCNC: 99 MMOL/L (ref 100–108)
CO2 SERPL-SCNC: 30 MMOL/L (ref 21–32)
CREAT SERPL-MCNC: 0.86 MG/DL (ref 0.7–1.3)
EOSINOPHIL NFR BLD: 1.6 % (ref 1–3)
ERYTHROCYTE [DISTWIDTH] IN BLOOD BY AUTOMATED COUNT: 16.2 % (ref 12.5–18)
GFR ESTIMATION: > 60
GLOBULIN: 4 G/DL (ref 2.3–3.5)
GLUCOSE SERPL-MCNC: 106 MG/DL (ref 70–110)
HCT VFR BLD AUTO: 40.8 % (ref 42–52)
HGB BLD-MCNC: 14 G/DL (ref 14–18)
LYMPHOCYTES NFR BLD: 31.7 % (ref 25–40)
MCH RBC QN AUTO: 34 PG (ref 27–31.2)
MCHC RBC AUTO-ENTMCNC: 34.3 G/DL (ref 31.8–35.4)
MCV RBC AUTO: 99 FL (ref 80–97)
MONOCYTES NFR BLD: 7.6 % (ref 1–15)
NEUTROPHILS # BLD AUTO: 3.74 10*3/UL (ref 1.8–7.7)
NEUTROPHILS NFR BLD: 58 % (ref 37–80)
NUCLEATED RED BLOOD CELLS: 0 %
PLATELETS: 149 10*3/UL (ref 142–424)
POTASSIUM SERPL-SCNC: 3.4 MMOL/L (ref 3.6–5.2)
PROT SERPL-MCNC: 7.6 G/DL (ref 6.4–8.2)
RBC # BLD AUTO: 4.12 10*6/UL (ref 4.7–6.1)
SODIUM BLD-SCNC: 136 MMOL/L (ref 135–145)
T3 SERPL-MCNC: 0.43 NG/ML
T4,THYROXINE: 2.4 UG/DL (ref 4.5–12.1)
TSH SERPL DL<=0.005 MIU/L-ACNC: 152.28 UIU/ML (ref 0.36–3.74)
WBC # BLD: 6.4 10*3/UL (ref 4.6–10.2)

## 2022-03-31 PROCEDURE — 1159F PR MEDICATION LIST DOCUMENTED IN MEDICAL RECORD: ICD-10-PCS | Mod: CPTII,S$GLB,, | Performed by: NURSE PRACTITIONER

## 2022-03-31 PROCEDURE — 3079F DIAST BP 80-89 MM HG: CPT | Mod: CPTII,S$GLB,, | Performed by: NURSE PRACTITIONER

## 2022-03-31 PROCEDURE — 1160F PR REVIEW ALL MEDS BY PRESCRIBER/CLIN PHARMACIST DOCUMENTED: ICD-10-PCS | Mod: CPTII,S$GLB,, | Performed by: NURSE PRACTITIONER

## 2022-03-31 PROCEDURE — 99214 OFFICE O/P EST MOD 30 MIN: CPT | Mod: S$GLB,,, | Performed by: NURSE PRACTITIONER

## 2022-03-31 PROCEDURE — 3008F BODY MASS INDEX DOCD: CPT | Mod: CPTII,S$GLB,, | Performed by: NURSE PRACTITIONER

## 2022-03-31 PROCEDURE — 3008F PR BODY MASS INDEX (BMI) DOCUMENTED: ICD-10-PCS | Mod: CPTII,S$GLB,, | Performed by: NURSE PRACTITIONER

## 2022-03-31 PROCEDURE — 3079F PR MOST RECENT DIASTOLIC BLOOD PRESSURE 80-89 MM HG: ICD-10-PCS | Mod: CPTII,S$GLB,, | Performed by: NURSE PRACTITIONER

## 2022-03-31 PROCEDURE — 3074F SYST BP LT 130 MM HG: CPT | Mod: CPTII,S$GLB,, | Performed by: NURSE PRACTITIONER

## 2022-03-31 PROCEDURE — 3074F PR MOST RECENT SYSTOLIC BLOOD PRESSURE < 130 MM HG: ICD-10-PCS | Mod: CPTII,S$GLB,, | Performed by: NURSE PRACTITIONER

## 2022-03-31 PROCEDURE — 1160F RVW MEDS BY RX/DR IN RCRD: CPT | Mod: CPTII,S$GLB,, | Performed by: NURSE PRACTITIONER

## 2022-03-31 PROCEDURE — 99214 PR OFFICE/OUTPT VISIT, EST, LEVL IV, 30-39 MIN: ICD-10-PCS | Mod: S$GLB,,, | Performed by: NURSE PRACTITIONER

## 2022-03-31 PROCEDURE — 1159F MED LIST DOCD IN RCRD: CPT | Mod: CPTII,S$GLB,, | Performed by: NURSE PRACTITIONER

## 2022-03-31 RX ORDER — SODIUM CHLORIDE 0.9 % (FLUSH) 0.9 %
10 SYRINGE (ML) INJECTION
Status: CANCELLED | OUTPATIENT
Start: 2022-03-31

## 2022-03-31 RX ORDER — LEVOTHYROXINE SODIUM 300 UG/1
0.3 TABLET ORAL
Qty: 30 TABLET | Refills: 11 | Status: SHIPPED | OUTPATIENT
Start: 2022-03-31 | End: 2022-05-12 | Stop reason: SDUPTHER

## 2022-03-31 RX ORDER — HEPARIN 100 UNIT/ML
500 SYRINGE INTRAVENOUS
Status: CANCELLED | OUTPATIENT
Start: 2022-03-31
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
## 2022-05-12 ENCOUNTER — TELEPHONE (OUTPATIENT)
Dept: HEMATOLOGY/ONCOLOGY | Facility: CLINIC | Age: 57
End: 2022-05-12

## 2022-05-12 ENCOUNTER — OFFICE VISIT (OUTPATIENT)
Dept: HEMATOLOGY/ONCOLOGY | Facility: CLINIC | Age: 57
End: 2022-05-12
Payer: MEDICAID

## 2022-05-12 VITALS
OXYGEN SATURATION: 95 % | HEIGHT: 64 IN | WEIGHT: 152 LBS | TEMPERATURE: 97 F | RESPIRATION RATE: 16 BRPM | BODY MASS INDEX: 25.95 KG/M2 | SYSTOLIC BLOOD PRESSURE: 138 MMHG | HEART RATE: 58 BPM | DIASTOLIC BLOOD PRESSURE: 87 MMHG

## 2022-05-12 DIAGNOSIS — C79.71 MALIGNANT NEOPLASM METASTATIC TO RIGHT ADRENAL GLAND: Primary | ICD-10-CM

## 2022-05-12 DIAGNOSIS — G89.3 CANCER RELATED PAIN: ICD-10-CM

## 2022-05-12 DIAGNOSIS — E03.9 HYPOTHYROIDISM, UNSPECIFIED TYPE: ICD-10-CM

## 2022-05-12 DIAGNOSIS — C34.92 MALIGNANT NEOPLASM OF LEFT LUNG STAGE 4: ICD-10-CM

## 2022-05-12 DIAGNOSIS — C79.51 METASTATIC CANCER TO BONE: ICD-10-CM

## 2022-05-12 PROCEDURE — 1160F RVW MEDS BY RX/DR IN RCRD: CPT | Mod: CPTII,S$GLB,, | Performed by: NURSE PRACTITIONER

## 2022-05-12 PROCEDURE — 1159F PR MEDICATION LIST DOCUMENTED IN MEDICAL RECORD: ICD-10-PCS | Mod: CPTII,S$GLB,, | Performed by: NURSE PRACTITIONER

## 2022-05-12 PROCEDURE — 3079F DIAST BP 80-89 MM HG: CPT | Mod: CPTII,S$GLB,, | Performed by: NURSE PRACTITIONER

## 2022-05-12 PROCEDURE — 3079F PR MOST RECENT DIASTOLIC BLOOD PRESSURE 80-89 MM HG: ICD-10-PCS | Mod: CPTII,S$GLB,, | Performed by: NURSE PRACTITIONER

## 2022-05-12 PROCEDURE — 3075F SYST BP GE 130 - 139MM HG: CPT | Mod: CPTII,S$GLB,, | Performed by: NURSE PRACTITIONER

## 2022-05-12 PROCEDURE — 99214 PR OFFICE/OUTPT VISIT, EST, LEVL IV, 30-39 MIN: ICD-10-PCS | Mod: S$GLB,,, | Performed by: NURSE PRACTITIONER

## 2022-05-12 PROCEDURE — 1160F PR REVIEW ALL MEDS BY PRESCRIBER/CLIN PHARMACIST DOCUMENTED: ICD-10-PCS | Mod: CPTII,S$GLB,, | Performed by: NURSE PRACTITIONER

## 2022-05-12 PROCEDURE — 3008F PR BODY MASS INDEX (BMI) DOCUMENTED: ICD-10-PCS | Mod: CPTII,S$GLB,, | Performed by: NURSE PRACTITIONER

## 2022-05-12 PROCEDURE — 99214 OFFICE O/P EST MOD 30 MIN: CPT | Mod: S$GLB,,, | Performed by: NURSE PRACTITIONER

## 2022-05-12 PROCEDURE — 3075F PR MOST RECENT SYSTOLIC BLOOD PRESS GE 130-139MM HG: ICD-10-PCS | Mod: CPTII,S$GLB,, | Performed by: NURSE PRACTITIONER

## 2022-05-12 PROCEDURE — 1159F MED LIST DOCD IN RCRD: CPT | Mod: CPTII,S$GLB,, | Performed by: NURSE PRACTITIONER

## 2022-05-12 PROCEDURE — 3008F BODY MASS INDEX DOCD: CPT | Mod: CPTII,S$GLB,, | Performed by: NURSE PRACTITIONER

## 2022-05-12 RX ORDER — OXYCODONE AND ACETAMINOPHEN 7.5; 325 MG/1; MG/1
1 TABLET ORAL EVERY 8 HOURS PRN
Qty: 90 TABLET | Refills: 0 | Status: SHIPPED | OUTPATIENT
Start: 2022-05-12 | End: 2022-06-23 | Stop reason: SDUPTHER

## 2022-05-12 RX ORDER — HEPARIN 100 UNIT/ML
500 SYRINGE INTRAVENOUS
Status: CANCELLED | OUTPATIENT
Start: 2022-05-12

## 2022-05-12 RX ORDER — SODIUM CHLORIDE 0.9 % (FLUSH) 0.9 %
10 SYRINGE (ML) INJECTION
Status: CANCELLED | OUTPATIENT
Start: 2022-05-12

## 2022-05-12 RX ORDER — LEVOTHYROXINE SODIUM 300 UG/1
0.3 TABLET ORAL
Qty: 30 TABLET | Refills: 11 | Status: SHIPPED | OUTPATIENT
Start: 2022-05-12 | End: 2023-03-16
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
## 2022-06-23 ENCOUNTER — CLINICAL SUPPORT (OUTPATIENT)
Dept: HEMATOLOGY/ONCOLOGY | Facility: CLINIC | Age: 57
End: 2022-06-23
Payer: MEDICAID

## 2022-06-23 ENCOUNTER — OFFICE VISIT (OUTPATIENT)
Dept: HEMATOLOGY/ONCOLOGY | Facility: CLINIC | Age: 57
End: 2022-06-23
Payer: MEDICAID

## 2022-06-23 VITALS
DIASTOLIC BLOOD PRESSURE: 88 MMHG | WEIGHT: 138 LBS | HEART RATE: 63 BPM | TEMPERATURE: 98 F | HEIGHT: 64 IN | BODY MASS INDEX: 23.56 KG/M2 | SYSTOLIC BLOOD PRESSURE: 154 MMHG | RESPIRATION RATE: 16 BRPM | OXYGEN SATURATION: 97 %

## 2022-06-23 DIAGNOSIS — C79.71 MALIGNANT NEOPLASM METASTATIC TO RIGHT ADRENAL GLAND: ICD-10-CM

## 2022-06-23 DIAGNOSIS — E03.9 HYPOTHYROIDISM, UNSPECIFIED TYPE: ICD-10-CM

## 2022-06-23 DIAGNOSIS — C79.71 MALIGNANT NEOPLASM METASTATIC TO RIGHT ADRENAL GLAND: Primary | ICD-10-CM

## 2022-06-23 DIAGNOSIS — C34.92 MALIGNANT NEOPLASM OF LEFT LUNG STAGE 4: ICD-10-CM

## 2022-06-23 DIAGNOSIS — G89.3 CANCER RELATED PAIN: ICD-10-CM

## 2022-06-23 DIAGNOSIS — C79.51 METASTATIC CANCER TO BONE: ICD-10-CM

## 2022-06-23 LAB
ALBUMIN SERPL BCP-MCNC: 3.4 G/DL (ref 3.4–5)
ALBUMIN/GLOBULIN RATIO: 0.72 RATIO (ref 1.1–1.8)
ALP SERPL-CCNC: 81 U/L (ref 46–116)
ALT SERPL W P-5'-P-CCNC: 30 U/L (ref 12–78)
ANION GAP SERPL CALC-SCNC: 10 MMOL/L (ref 3–11)
AST SERPL-CCNC: 33 U/L (ref 15–37)
BASOPHILS NFR BLD: 0.5 % (ref 0–3)
BILIRUB SERPL-MCNC: 0.3 MG/DL (ref 0–1)
BUN SERPL-MCNC: 4 MG/DL (ref 7–18)
BUN/CREAT SERPL: 4.3 RATIO (ref 7–18)
CALCIUM SERPL-MCNC: 9.5 MG/DL (ref 8.8–10.5)
CHLORIDE SERPL-SCNC: 104 MMOL/L (ref 100–108)
CO2 SERPL-SCNC: 27 MMOL/L (ref 21–32)
CREAT SERPL-MCNC: 0.93 MG/DL (ref 0.7–1.3)
EOSINOPHIL NFR BLD: 3.3 % (ref 1–3)
ERYTHROCYTE [DISTWIDTH] IN BLOOD BY AUTOMATED COUNT: 15.2 % (ref 12.5–18)
GFR ESTIMATION: > 60
GLOBULIN: 4.7 G/DL (ref 2.3–3.5)
GLUCOSE SERPL-MCNC: 100 MG/DL (ref 70–110)
HCT VFR BLD AUTO: 41.6 % (ref 42–52)
HGB BLD-MCNC: 14.1 G/DL (ref 14–18)
LYMPHOCYTES NFR BLD: 32.3 % (ref 25–40)
MACROCYTES BLD QL SMEAR: NORMAL
MCH RBC QN AUTO: 35.4 PG (ref 27–31.2)
MCHC RBC AUTO-ENTMCNC: 33.9 G/DL (ref 31.8–35.4)
MCV RBC AUTO: 104.5 FL (ref 80–97)
MONOCYTES NFR BLD: 7.8 % (ref 1–15)
NEUTROPHILS # BLD AUTO: 2.36 10*3/UL (ref 1.8–7.7)
NEUTROPHILS NFR BLD: 55.6 % (ref 37–80)
NUCLEATED RED BLOOD CELLS: 0 %
PLATELETS: 114 10*3/UL (ref 142–424)
POTASSIUM SERPL-SCNC: 3.6 MMOL/L (ref 3.6–5.2)
PROT SERPL-MCNC: 8.1 G/DL (ref 6.4–8.2)
RBC # BLD AUTO: 3.98 10*6/UL (ref 4.7–6.1)
SODIUM BLD-SCNC: 141 MMOL/L (ref 135–145)
TSH SERPL DL<=0.005 MIU/L-ACNC: 2.92 UIU/ML (ref 0.36–3.74)
WBC # BLD: 4.2 10*3/UL (ref 4.6–10.2)

## 2022-06-23 PROCEDURE — 1159F MED LIST DOCD IN RCRD: CPT | Mod: CPTII,S$GLB,, | Performed by: NURSE PRACTITIONER

## 2022-06-23 PROCEDURE — 99215 OFFICE O/P EST HI 40 MIN: CPT | Mod: S$GLB,,, | Performed by: NURSE PRACTITIONER

## 2022-06-23 PROCEDURE — 3079F PR MOST RECENT DIASTOLIC BLOOD PRESSURE 80-89 MM HG: ICD-10-PCS | Mod: CPTII,S$GLB,, | Performed by: NURSE PRACTITIONER

## 2022-06-23 PROCEDURE — 3077F SYST BP >= 140 MM HG: CPT | Mod: CPTII,S$GLB,, | Performed by: NURSE PRACTITIONER

## 2022-06-23 PROCEDURE — 1159F PR MEDICATION LIST DOCUMENTED IN MEDICAL RECORD: ICD-10-PCS | Mod: CPTII,S$GLB,, | Performed by: NURSE PRACTITIONER

## 2022-06-23 PROCEDURE — 3077F PR MOST RECENT SYSTOLIC BLOOD PRESSURE >= 140 MM HG: ICD-10-PCS | Mod: CPTII,S$GLB,, | Performed by: NURSE PRACTITIONER

## 2022-06-23 PROCEDURE — 3079F DIAST BP 80-89 MM HG: CPT | Mod: CPTII,S$GLB,, | Performed by: NURSE PRACTITIONER

## 2022-06-23 PROCEDURE — 99215 PR OFFICE/OUTPT VISIT, EST, LEVL V, 40-54 MIN: ICD-10-PCS | Mod: S$GLB,,, | Performed by: NURSE PRACTITIONER

## 2022-06-23 PROCEDURE — 3008F BODY MASS INDEX DOCD: CPT | Mod: CPTII,S$GLB,, | Performed by: NURSE PRACTITIONER

## 2022-06-23 PROCEDURE — 3008F PR BODY MASS INDEX (BMI) DOCUMENTED: ICD-10-PCS | Mod: CPTII,S$GLB,, | Performed by: NURSE PRACTITIONER

## 2022-06-23 RX ORDER — HEPARIN 100 UNIT/ML
500 SYRINGE INTRAVENOUS
Status: CANCELLED | OUTPATIENT
Start: 2022-06-23

## 2022-06-23 RX ORDER — OXYCODONE AND ACETAMINOPHEN 7.5; 325 MG/1; MG/1
1 TABLET ORAL EVERY 8 HOURS PRN
Qty: 90 TABLET | Refills: 0 | Status: SHIPPED | OUTPATIENT
Start: 2022-06-23 | End: 2022-08-04 | Stop reason: SDUPTHER

## 2022-06-23 RX ORDER — SODIUM CHLORIDE 0.9 % (FLUSH) 0.9 %
10 SYRINGE (ML) INJECTION
Status: CANCELLED | OUTPATIENT
Start: 2022-06-23
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
## 2022-07-13 ENCOUNTER — OUTSIDE PLACE OF SERVICE (OUTPATIENT)
Dept: HEMATOLOGY/ONCOLOGY | Facility: CLINIC | Age: 57
End: 2022-07-13
Payer: MEDICAID

## 2022-07-13 PROCEDURE — 99232 PR SUBSEQUENT HOSPITAL CARE,LEVL II: ICD-10-PCS | Mod: ,,, | Performed by: INTERNAL MEDICINE

## 2022-07-13 PROCEDURE — 99232 SBSQ HOSP IP/OBS MODERATE 35: CPT | Mod: ,,, | Performed by: INTERNAL MEDICINE

## 2022-07-15 ENCOUNTER — OUTSIDE PLACE OF SERVICE (OUTPATIENT)
Dept: HEMATOLOGY/ONCOLOGY | Facility: CLINIC | Age: 57
End: 2022-07-15
Payer: MEDICAID

## 2022-07-15 PROCEDURE — 99231 PR SUBSEQUENT HOSPITAL CARE,LEVL I: ICD-10-PCS | Mod: ,,, | Performed by: INTERNAL MEDICINE

## 2022-07-15 PROCEDURE — 99231 SBSQ HOSP IP/OBS SF/LOW 25: CPT | Mod: ,,, | Performed by: INTERNAL MEDICINE

## 2022-08-04 ENCOUNTER — OFFICE VISIT (OUTPATIENT)
Dept: HEMATOLOGY/ONCOLOGY | Facility: CLINIC | Age: 57
End: 2022-08-04
Payer: MEDICAID

## 2022-08-04 ENCOUNTER — CLINICAL SUPPORT (OUTPATIENT)
Dept: HEMATOLOGY/ONCOLOGY | Facility: CLINIC | Age: 57
End: 2022-08-04
Payer: MEDICAID

## 2022-08-04 VITALS
RESPIRATION RATE: 18 BRPM | SYSTOLIC BLOOD PRESSURE: 131 MMHG | BODY MASS INDEX: 23.9 KG/M2 | HEIGHT: 64 IN | OXYGEN SATURATION: 96 % | HEART RATE: 83 BPM | DIASTOLIC BLOOD PRESSURE: 82 MMHG | WEIGHT: 140 LBS

## 2022-08-04 DIAGNOSIS — G89.3 CANCER RELATED PAIN: ICD-10-CM

## 2022-08-04 DIAGNOSIS — C34.92 MALIGNANT NEOPLASM OF LEFT LUNG STAGE 4: ICD-10-CM

## 2022-08-04 DIAGNOSIS — C79.71 MALIGNANT NEOPLASM METASTATIC TO RIGHT ADRENAL GLAND: ICD-10-CM

## 2022-08-04 DIAGNOSIS — C79.51 METASTATIC CANCER TO BONE: ICD-10-CM

## 2022-08-04 LAB
ALBUMIN SERPL BCP-MCNC: 3.5 G/DL (ref 3.4–5)
ALBUMIN/GLOBULIN RATIO: 0.71 RATIO (ref 1.1–1.8)
ALP SERPL-CCNC: 95 U/L (ref 46–116)
ALT SERPL W P-5'-P-CCNC: 36 U/L (ref 12–78)
ANION GAP SERPL CALC-SCNC: 9 MMOL/L (ref 3–11)
AST SERPL-CCNC: 26 U/L (ref 15–37)
BASOPHILS NFR BLD: 0.3 % (ref 0–3)
BILIRUB SERPL-MCNC: 0.4 MG/DL (ref 0–1)
BUN SERPL-MCNC: 18 MG/DL (ref 7–18)
BUN/CREAT SERPL: 23.37 RATIO (ref 7–18)
CALCIUM SERPL-MCNC: 9.3 MG/DL (ref 8.8–10.5)
CHLORIDE SERPL-SCNC: 107 MMOL/L (ref 100–108)
CO2 SERPL-SCNC: 24 MMOL/L (ref 21–32)
CREAT SERPL-MCNC: 0.77 MG/DL (ref 0.7–1.3)
EOSINOPHIL NFR BLD: 2.7 % (ref 1–3)
ERYTHROCYTE [DISTWIDTH] IN BLOOD BY AUTOMATED COUNT: 13.9 % (ref 12.5–18)
GFR ESTIMATION: > 60
GLOBULIN: 4.9 G/DL (ref 2.3–3.5)
GLUCOSE SERPL-MCNC: 115 MG/DL (ref 70–110)
HCT VFR BLD AUTO: 37.3 % (ref 42–52)
HGB BLD-MCNC: 12.3 G/DL (ref 14–18)
LYMPHOCYTES NFR BLD: 24.7 % (ref 25–40)
MACROCYTES BLD QL SMEAR: NORMAL
MCH RBC QN AUTO: 33.2 PG (ref 27–31.2)
MCHC RBC AUTO-ENTMCNC: 33 G/DL (ref 31.8–35.4)
MCV RBC AUTO: 100.8 FL (ref 80–97)
MONOCYTES NFR BLD: 9.6 % (ref 1–15)
NEUTROPHILS # BLD AUTO: 4.32 10*3/UL (ref 1.8–7.7)
NEUTROPHILS NFR BLD: 62.3 % (ref 37–80)
NUCLEATED RED BLOOD CELLS: 0 %
PLATELETS: 130 10*3/UL (ref 142–424)
POTASSIUM SERPL-SCNC: 3.7 MMOL/L (ref 3.6–5.2)
PROT SERPL-MCNC: 8.4 G/DL (ref 6.4–8.2)
RBC # BLD AUTO: 3.7 10*6/UL (ref 4.7–6.1)
SODIUM BLD-SCNC: 140 MMOL/L (ref 135–145)
TSH SERPL DL<=0.005 MIU/L-ACNC: 0.03 UIU/ML (ref 0.36–3.74)
WBC # BLD: 7 10*3/UL (ref 4.6–10.2)

## 2022-08-04 PROCEDURE — 3079F DIAST BP 80-89 MM HG: CPT | Mod: CPTII,S$GLB,, | Performed by: NURSE PRACTITIONER

## 2022-08-04 PROCEDURE — 1159F MED LIST DOCD IN RCRD: CPT | Mod: CPTII,S$GLB,, | Performed by: NURSE PRACTITIONER

## 2022-08-04 PROCEDURE — 1159F PR MEDICATION LIST DOCUMENTED IN MEDICAL RECORD: ICD-10-PCS | Mod: CPTII,S$GLB,, | Performed by: NURSE PRACTITIONER

## 2022-08-04 PROCEDURE — 3075F PR MOST RECENT SYSTOLIC BLOOD PRESS GE 130-139MM HG: ICD-10-PCS | Mod: CPTII,S$GLB,, | Performed by: NURSE PRACTITIONER

## 2022-08-04 PROCEDURE — 3008F BODY MASS INDEX DOCD: CPT | Mod: CPTII,S$GLB,, | Performed by: NURSE PRACTITIONER

## 2022-08-04 PROCEDURE — 3079F PR MOST RECENT DIASTOLIC BLOOD PRESSURE 80-89 MM HG: ICD-10-PCS | Mod: CPTII,S$GLB,, | Performed by: NURSE PRACTITIONER

## 2022-08-04 PROCEDURE — 3075F SYST BP GE 130 - 139MM HG: CPT | Mod: CPTII,S$GLB,, | Performed by: NURSE PRACTITIONER

## 2022-08-04 PROCEDURE — 3008F PR BODY MASS INDEX (BMI) DOCUMENTED: ICD-10-PCS | Mod: CPTII,S$GLB,, | Performed by: NURSE PRACTITIONER

## 2022-08-04 PROCEDURE — 99215 PR OFFICE/OUTPT VISIT, EST, LEVL V, 40-54 MIN: ICD-10-PCS | Mod: S$GLB,,, | Performed by: NURSE PRACTITIONER

## 2022-08-04 PROCEDURE — 99215 OFFICE O/P EST HI 40 MIN: CPT | Mod: S$GLB,,, | Performed by: NURSE PRACTITIONER

## 2022-08-04 RX ORDER — ASPIRIN 81 MG/1
81 TABLET ORAL DAILY
COMMUNITY
End: 2023-01-30

## 2022-08-04 RX ORDER — OXYCODONE AND ACETAMINOPHEN 7.5; 325 MG/1; MG/1
1 TABLET ORAL EVERY 8 HOURS PRN
Qty: 90 TABLET | Refills: 0 | Status: SHIPPED | OUTPATIENT
Start: 2022-08-04 | End: 2022-08-25 | Stop reason: SDUPTHER

## 2022-08-04 RX ORDER — AMOXICILLIN AND CLAVULANATE POTASSIUM 875; 125 MG/1; MG/1
TABLET, FILM COATED ORAL
COMMUNITY
Start: 2022-08-03 | End: 2023-01-30
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
## 2022-08-25 ENCOUNTER — CLINICAL SUPPORT (OUTPATIENT)
Dept: HEMATOLOGY/ONCOLOGY | Facility: CLINIC | Age: 57
End: 2022-08-25
Payer: MEDICAID

## 2022-08-25 ENCOUNTER — OFFICE VISIT (OUTPATIENT)
Dept: HEMATOLOGY/ONCOLOGY | Facility: CLINIC | Age: 57
End: 2022-08-25
Payer: MEDICAID

## 2022-08-25 VITALS
SYSTOLIC BLOOD PRESSURE: 120 MMHG | RESPIRATION RATE: 16 BRPM | DIASTOLIC BLOOD PRESSURE: 82 MMHG | WEIGHT: 141 LBS | OXYGEN SATURATION: 96 % | BODY MASS INDEX: 24.07 KG/M2 | HEART RATE: 79 BPM | HEIGHT: 64 IN

## 2022-08-25 DIAGNOSIS — G89.3 CANCER RELATED PAIN: ICD-10-CM

## 2022-08-25 DIAGNOSIS — C79.71 MALIGNANT NEOPLASM METASTATIC TO RIGHT ADRENAL GLAND: ICD-10-CM

## 2022-08-25 DIAGNOSIS — C79.51 METASTATIC CANCER TO BONE: ICD-10-CM

## 2022-08-25 DIAGNOSIS — C34.92 MALIGNANT NEOPLASM OF LEFT LUNG STAGE 4: ICD-10-CM

## 2022-08-25 LAB
ALBUMIN SERPL BCP-MCNC: 3.5 G/DL (ref 3.4–5)
ALBUMIN/GLOBULIN RATIO: 0.73 RATIO (ref 1.1–1.8)
ALP SERPL-CCNC: 70 U/L (ref 46–116)
ALT SERPL W P-5'-P-CCNC: 19 U/L (ref 12–78)
ANION GAP SERPL CALC-SCNC: 9 MMOL/L (ref 3–11)
AST SERPL-CCNC: 20 U/L (ref 15–37)
BASOPHILS NFR BLD: 0.3 % (ref 0–3)
BILIRUB SERPL-MCNC: 0.3 MG/DL (ref 0–1)
BUN SERPL-MCNC: 8 MG/DL (ref 7–18)
BUN/CREAT SERPL: 8.79 RATIO (ref 7–18)
CALCIUM SERPL-MCNC: 9.7 MG/DL (ref 8.8–10.5)
CHLORIDE SERPL-SCNC: 100 MMOL/L (ref 100–108)
CO2 SERPL-SCNC: 28 MMOL/L (ref 21–32)
CREAT SERPL-MCNC: 0.91 MG/DL (ref 0.7–1.3)
EOSINOPHIL NFR BLD: 3.6 % (ref 1–3)
ERYTHROCYTE [DISTWIDTH] IN BLOOD BY AUTOMATED COUNT: 14.1 % (ref 12.5–18)
GFR ESTIMATION: > 60
GLOBULIN: 4.8 G/DL (ref 2.3–3.5)
GLUCOSE SERPL-MCNC: 101 MG/DL (ref 70–110)
HCT VFR BLD AUTO: 41.8 % (ref 42–52)
HGB BLD-MCNC: 13.6 G/DL (ref 14–18)
LYMPHOCYTES NFR BLD: 30.1 % (ref 25–40)
MCH RBC QN AUTO: 31.8 PG (ref 27–31.2)
MCHC RBC AUTO-ENTMCNC: 32.5 G/DL (ref 31.8–35.4)
MCV RBC AUTO: 97.7 FL (ref 80–97)
MONOCYTES NFR BLD: 6.7 % (ref 1–15)
NEUTROPHILS # BLD AUTO: 3.92 10*3/UL (ref 1.8–7.7)
NEUTROPHILS NFR BLD: 58.9 % (ref 37–80)
NUCLEATED RED BLOOD CELLS: 0 %
PLATELETS: 171 10*3/UL (ref 142–424)
POTASSIUM SERPL-SCNC: 3.8 MMOL/L (ref 3.6–5.2)
PROT SERPL-MCNC: 8.3 G/DL (ref 6.4–8.2)
RBC # BLD AUTO: 4.28 10*6/UL (ref 4.7–6.1)
SODIUM BLD-SCNC: 137 MMOL/L (ref 135–145)
TSH SERPL DL<=0.005 MIU/L-ACNC: 21.22 UIU/ML (ref 0.36–3.74)
WBC # BLD: 6.7 10*3/UL (ref 4.6–10.2)

## 2022-08-25 PROCEDURE — 1159F PR MEDICATION LIST DOCUMENTED IN MEDICAL RECORD: ICD-10-PCS | Mod: CPTII,S$GLB,, | Performed by: NURSE PRACTITIONER

## 2022-08-25 PROCEDURE — 99215 PR OFFICE/OUTPT VISIT, EST, LEVL V, 40-54 MIN: ICD-10-PCS | Mod: S$GLB,,, | Performed by: NURSE PRACTITIONER

## 2022-08-25 PROCEDURE — 3008F PR BODY MASS INDEX (BMI) DOCUMENTED: ICD-10-PCS | Mod: CPTII,S$GLB,, | Performed by: NURSE PRACTITIONER

## 2022-08-25 PROCEDURE — 3079F DIAST BP 80-89 MM HG: CPT | Mod: CPTII,S$GLB,, | Performed by: NURSE PRACTITIONER

## 2022-08-25 PROCEDURE — 3008F BODY MASS INDEX DOCD: CPT | Mod: CPTII,S$GLB,, | Performed by: NURSE PRACTITIONER

## 2022-08-25 PROCEDURE — 3074F SYST BP LT 130 MM HG: CPT | Mod: CPTII,S$GLB,, | Performed by: NURSE PRACTITIONER

## 2022-08-25 PROCEDURE — 3079F PR MOST RECENT DIASTOLIC BLOOD PRESSURE 80-89 MM HG: ICD-10-PCS | Mod: CPTII,S$GLB,, | Performed by: NURSE PRACTITIONER

## 2022-08-25 PROCEDURE — 3074F PR MOST RECENT SYSTOLIC BLOOD PRESSURE < 130 MM HG: ICD-10-PCS | Mod: CPTII,S$GLB,, | Performed by: NURSE PRACTITIONER

## 2022-08-25 PROCEDURE — 99215 OFFICE O/P EST HI 40 MIN: CPT | Mod: S$GLB,,, | Performed by: NURSE PRACTITIONER

## 2022-08-25 PROCEDURE — 1159F MED LIST DOCD IN RCRD: CPT | Mod: CPTII,S$GLB,, | Performed by: NURSE PRACTITIONER

## 2022-08-25 RX ORDER — OXYCODONE AND ACETAMINOPHEN 7.5; 325 MG/1; MG/1
1 TABLET ORAL EVERY 8 HOURS PRN
Qty: 90 TABLET | Refills: 0 | Status: SHIPPED | OUTPATIENT
Start: 2022-08-25 | End: 2022-10-03 | Stop reason: SDUPTHER

## 2022-08-25 RX ORDER — HEPARIN 100 UNIT/ML
500 SYRINGE INTRAVENOUS
Status: CANCELLED | OUTPATIENT
Start: 2022-08-25

## 2022-08-25 RX ORDER — SODIUM CHLORIDE 0.9 % (FLUSH) 0.9 %
10 SYRINGE (ML) INJECTION
Status: CANCELLED | OUTPATIENT
Start: 2022-08-25

## 2022-09-28 ENCOUNTER — OFFICE VISIT (OUTPATIENT)
Dept: HEMATOLOGY/ONCOLOGY | Facility: CLINIC | Age: 57
End: 2022-09-28
Payer: MEDICAID

## 2022-09-28 VITALS
DIASTOLIC BLOOD PRESSURE: 93 MMHG | HEIGHT: 64 IN | RESPIRATION RATE: 16 BRPM | BODY MASS INDEX: 24.41 KG/M2 | OXYGEN SATURATION: 97 % | TEMPERATURE: 98 F | SYSTOLIC BLOOD PRESSURE: 134 MMHG | HEART RATE: 73 BPM | WEIGHT: 143 LBS

## 2022-09-28 DIAGNOSIS — C34.92 MALIGNANT NEOPLASM OF LEFT LUNG STAGE 4: Primary | ICD-10-CM

## 2022-09-28 DIAGNOSIS — C79.51 METASTATIC CANCER TO BONE: ICD-10-CM

## 2022-09-28 LAB
ALBUMIN SERPL BCP-MCNC: 3.7 G/DL (ref 3.4–5)
ALBUMIN/GLOBULIN RATIO: 0.86 RATIO (ref 1.1–1.8)
ALP SERPL-CCNC: 66 U/L (ref 46–116)
ALT SERPL W P-5'-P-CCNC: 13 U/L (ref 12–78)
ANION GAP SERPL CALC-SCNC: 7 MMOL/L (ref 3–11)
AST SERPL-CCNC: 20 U/L (ref 15–37)
BASOPHILS NFR BLD: 0.4 % (ref 0–3)
BILIRUB SERPL-MCNC: 0.4 MG/DL (ref 0–1)
BUN SERPL-MCNC: 8 MG/DL (ref 7–18)
BUN/CREAT SERPL: 11.26 RATIO (ref 7–18)
CALCIUM SERPL-MCNC: 9.7 MG/DL (ref 8.8–10.5)
CHLORIDE SERPL-SCNC: 102 MMOL/L (ref 100–108)
CO2 SERPL-SCNC: 30 MMOL/L (ref 21–32)
CREAT SERPL-MCNC: 0.71 MG/DL (ref 0.7–1.3)
EOSINOPHIL NFR BLD: 2.8 % (ref 1–3)
ERYTHROCYTE [DISTWIDTH] IN BLOOD BY AUTOMATED COUNT: 14.1 % (ref 12.5–18)
GFR ESTIMATION: > 60
GLOBULIN: 4.3 G/DL (ref 2.3–3.5)
GLUCOSE SERPL-MCNC: 100 MG/DL (ref 70–110)
HCT VFR BLD AUTO: 39 % (ref 42–52)
HGB BLD-MCNC: 12.7 G/DL (ref 14–18)
LYMPHOCYTES NFR BLD: 35.7 % (ref 25–40)
MCH RBC QN AUTO: 30.2 PG (ref 27–31.2)
MCHC RBC AUTO-ENTMCNC: 32.6 G/DL (ref 31.8–35.4)
MCV RBC AUTO: 92.6 FL (ref 80–97)
MONOCYTES NFR BLD: 6.4 % (ref 1–15)
NEUTROPHILS # BLD AUTO: 3.08 10*3/UL (ref 1.8–7.7)
NEUTROPHILS NFR BLD: 54.3 % (ref 37–80)
NUCLEATED RED BLOOD CELLS: 0 %
PLATELETS: 163 10*3/UL (ref 142–424)
POTASSIUM SERPL-SCNC: 4.5 MMOL/L (ref 3.6–5.2)
PROT SERPL-MCNC: 8 G/DL (ref 6.4–8.2)
RBC # BLD AUTO: 4.21 10*6/UL (ref 4.7–6.1)
SODIUM BLD-SCNC: 139 MMOL/L (ref 135–145)
WBC # BLD: 5.7 10*3/UL (ref 4.6–10.2)

## 2022-09-28 PROCEDURE — 3008F BODY MASS INDEX DOCD: CPT | Mod: CPTII,S$GLB,, | Performed by: NURSE PRACTITIONER

## 2022-09-28 PROCEDURE — 3075F SYST BP GE 130 - 139MM HG: CPT | Mod: CPTII,S$GLB,, | Performed by: NURSE PRACTITIONER

## 2022-09-28 PROCEDURE — 99215 PR OFFICE/OUTPT VISIT, EST, LEVL V, 40-54 MIN: ICD-10-PCS | Mod: S$GLB,,, | Performed by: NURSE PRACTITIONER

## 2022-09-28 PROCEDURE — 99215 OFFICE O/P EST HI 40 MIN: CPT | Mod: S$GLB,,, | Performed by: NURSE PRACTITIONER

## 2022-09-28 PROCEDURE — 1159F MED LIST DOCD IN RCRD: CPT | Mod: CPTII,S$GLB,, | Performed by: NURSE PRACTITIONER

## 2022-09-28 PROCEDURE — 3075F PR MOST RECENT SYSTOLIC BLOOD PRESS GE 130-139MM HG: ICD-10-PCS | Mod: CPTII,S$GLB,, | Performed by: NURSE PRACTITIONER

## 2022-09-28 PROCEDURE — 1160F PR REVIEW ALL MEDS BY PRESCRIBER/CLIN PHARMACIST DOCUMENTED: ICD-10-PCS | Mod: CPTII,S$GLB,, | Performed by: NURSE PRACTITIONER

## 2022-09-28 PROCEDURE — 1160F RVW MEDS BY RX/DR IN RCRD: CPT | Mod: CPTII,S$GLB,, | Performed by: NURSE PRACTITIONER

## 2022-09-28 PROCEDURE — 1159F PR MEDICATION LIST DOCUMENTED IN MEDICAL RECORD: ICD-10-PCS | Mod: CPTII,S$GLB,, | Performed by: NURSE PRACTITIONER

## 2022-09-28 PROCEDURE — 3080F DIAST BP >= 90 MM HG: CPT | Mod: CPTII,S$GLB,, | Performed by: NURSE PRACTITIONER

## 2022-09-28 PROCEDURE — 3080F PR MOST RECENT DIASTOLIC BLOOD PRESSURE >= 90 MM HG: ICD-10-PCS | Mod: CPTII,S$GLB,, | Performed by: NURSE PRACTITIONER

## 2022-09-28 PROCEDURE — 3008F PR BODY MASS INDEX (BMI) DOCUMENTED: ICD-10-PCS | Mod: CPTII,S$GLB,, | Performed by: NURSE PRACTITIONER

## 2022-10-03 DIAGNOSIS — C34.92 MALIGNANT NEOPLASM OF LEFT LUNG STAGE 4: ICD-10-CM

## 2022-10-03 DIAGNOSIS — C79.51 METASTATIC CANCER TO BONE: ICD-10-CM

## 2022-10-03 DIAGNOSIS — G89.3 CANCER RELATED PAIN: ICD-10-CM

## 2022-10-03 RX ORDER — OXYCODONE AND ACETAMINOPHEN 7.5; 325 MG/1; MG/1
1 TABLET ORAL EVERY 8 HOURS PRN
Qty: 90 TABLET | Refills: 0 | Status: SHIPPED | OUTPATIENT
Start: 2022-10-03 | End: 2022-11-07 | Stop reason: SDUPTHER

## 2022-10-19 ENCOUNTER — OFFICE VISIT (OUTPATIENT)
Dept: HEMATOLOGY/ONCOLOGY | Facility: CLINIC | Age: 57
End: 2022-10-19
Payer: MEDICAID

## 2022-10-19 ENCOUNTER — TELEPHONE (OUTPATIENT)
Dept: HEMATOLOGY/ONCOLOGY | Facility: CLINIC | Age: 57
End: 2022-10-19

## 2022-10-19 VITALS
HEIGHT: 64 IN | TEMPERATURE: 98 F | WEIGHT: 143 LBS | HEART RATE: 74 BPM | OXYGEN SATURATION: 97 % | BODY MASS INDEX: 24.41 KG/M2 | DIASTOLIC BLOOD PRESSURE: 90 MMHG | SYSTOLIC BLOOD PRESSURE: 142 MMHG | RESPIRATION RATE: 15 BRPM

## 2022-10-19 DIAGNOSIS — C34.92 MALIGNANT NEOPLASM OF LEFT LUNG STAGE 4: Primary | ICD-10-CM

## 2022-10-19 DIAGNOSIS — N63.0 BREAST NODULE: ICD-10-CM

## 2022-10-19 LAB
ALBUMIN SERPL BCP-MCNC: 3.8 G/DL (ref 3.4–5)
ALBUMIN/GLOBULIN RATIO: 0.79 RATIO (ref 1.1–1.8)
ALP SERPL-CCNC: 76 U/L (ref 46–116)
ALT SERPL W P-5'-P-CCNC: 11 U/L (ref 12–78)
ANION GAP SERPL CALC-SCNC: 3 MMOL/L (ref 3–11)
AST SERPL-CCNC: 18 U/L (ref 15–37)
BASOPHILS NFR BLD: 0.3 % (ref 0–3)
BILIRUB SERPL-MCNC: 0.4 MG/DL (ref 0–1)
BUN SERPL-MCNC: 11 MG/DL (ref 7–18)
BUN/CREAT SERPL: 13.75 RATIO (ref 7–18)
CALCIUM SERPL-MCNC: 9.7 MG/DL (ref 8.8–10.5)
CHLORIDE SERPL-SCNC: 103 MMOL/L (ref 100–108)
CO2 SERPL-SCNC: 31 MMOL/L (ref 21–32)
CREAT SERPL-MCNC: 0.8 MG/DL (ref 0.7–1.3)
EOSINOPHIL NFR BLD: 2.3 % (ref 1–3)
ERYTHROCYTE [DISTWIDTH] IN BLOOD BY AUTOMATED COUNT: 15.1 % (ref 12.5–18)
GFR ESTIMATION: > 60
GLOBULIN: 4.8 G/DL (ref 2.3–3.5)
GLUCOSE SERPL-MCNC: 92 MG/DL (ref 70–110)
HCT VFR BLD AUTO: 40.9 % (ref 42–52)
HGB BLD-MCNC: 13.4 G/DL (ref 14–18)
LYMPHOCYTES NFR BLD: 28.6 % (ref 25–40)
MCH RBC QN AUTO: 30.9 PG (ref 27–31.2)
MCHC RBC AUTO-ENTMCNC: 32.8 G/DL (ref 31.8–35.4)
MCV RBC AUTO: 94.2 FL (ref 80–97)
MONOCYTES NFR BLD: 7 % (ref 1–15)
NEUTROPHILS # BLD AUTO: 4.04 10*3/UL (ref 1.8–7.7)
NEUTROPHILS NFR BLD: 61.2 % (ref 37–80)
NUCLEATED RED BLOOD CELLS: 0 %
PLATELETS: 139 10*3/UL (ref 142–424)
POTASSIUM SERPL-SCNC: 4.2 MMOL/L (ref 3.6–5.2)
PROT SERPL-MCNC: 8.6 G/DL (ref 6.4–8.2)
RBC # BLD AUTO: 4.34 10*6/UL (ref 4.7–6.1)
SODIUM BLD-SCNC: 137 MMOL/L (ref 135–145)
WBC # BLD: 6.6 10*3/UL (ref 4.6–10.2)

## 2022-10-19 PROCEDURE — 3080F PR MOST RECENT DIASTOLIC BLOOD PRESSURE >= 90 MM HG: ICD-10-PCS | Mod: CPTII,S$GLB,, | Performed by: NURSE PRACTITIONER

## 2022-10-19 PROCEDURE — 3077F PR MOST RECENT SYSTOLIC BLOOD PRESSURE >= 140 MM HG: ICD-10-PCS | Mod: CPTII,S$GLB,, | Performed by: NURSE PRACTITIONER

## 2022-10-19 PROCEDURE — 1160F PR REVIEW ALL MEDS BY PRESCRIBER/CLIN PHARMACIST DOCUMENTED: ICD-10-PCS | Mod: CPTII,S$GLB,, | Performed by: NURSE PRACTITIONER

## 2022-10-19 PROCEDURE — 99214 PR OFFICE/OUTPT VISIT, EST, LEVL IV, 30-39 MIN: ICD-10-PCS | Mod: S$GLB,,, | Performed by: NURSE PRACTITIONER

## 2022-10-19 PROCEDURE — 1159F MED LIST DOCD IN RCRD: CPT | Mod: CPTII,S$GLB,, | Performed by: NURSE PRACTITIONER

## 2022-10-19 PROCEDURE — 3077F SYST BP >= 140 MM HG: CPT | Mod: CPTII,S$GLB,, | Performed by: NURSE PRACTITIONER

## 2022-10-19 PROCEDURE — 1159F PR MEDICATION LIST DOCUMENTED IN MEDICAL RECORD: ICD-10-PCS | Mod: CPTII,S$GLB,, | Performed by: NURSE PRACTITIONER

## 2022-10-19 PROCEDURE — 99214 OFFICE O/P EST MOD 30 MIN: CPT | Mod: S$GLB,,, | Performed by: NURSE PRACTITIONER

## 2022-10-19 PROCEDURE — 1160F RVW MEDS BY RX/DR IN RCRD: CPT | Mod: CPTII,S$GLB,, | Performed by: NURSE PRACTITIONER

## 2022-10-19 PROCEDURE — 3080F DIAST BP >= 90 MM HG: CPT | Mod: CPTII,S$GLB,, | Performed by: NURSE PRACTITIONER

## 2022-10-19 RX ORDER — HEPARIN 100 UNIT/ML
500 SYRINGE INTRAVENOUS
Status: CANCELLED | OUTPATIENT
Start: 2022-10-20

## 2022-10-19 RX ORDER — SODIUM CHLORIDE 0.9 % (FLUSH) 0.9 %
10 SYRINGE (ML) INJECTION
Status: CANCELLED | OUTPATIENT
Start: 2022-10-20
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
## 2022-11-07 DIAGNOSIS — C34.92 MALIGNANT NEOPLASM OF LEFT LUNG STAGE 4: ICD-10-CM

## 2022-11-07 DIAGNOSIS — G89.3 CANCER RELATED PAIN: ICD-10-CM

## 2022-11-07 DIAGNOSIS — C79.51 METASTATIC CANCER TO BONE: ICD-10-CM

## 2022-11-07 RX ORDER — OXYCODONE AND ACETAMINOPHEN 7.5; 325 MG/1; MG/1
1 TABLET ORAL EVERY 8 HOURS PRN
Qty: 90 TABLET | Refills: 0 | Status: SHIPPED | OUTPATIENT
Start: 2022-11-07 | End: 2022-12-01

## 2022-11-30 DIAGNOSIS — C34.92 MALIGNANT NEOPLASM OF LEFT LUNG STAGE 4: Primary | ICD-10-CM

## 2022-12-01 ENCOUNTER — OFFICE VISIT (OUTPATIENT)
Dept: HEMATOLOGY/ONCOLOGY | Facility: CLINIC | Age: 57
End: 2022-12-01
Payer: MEDICAID

## 2022-12-01 VITALS
RESPIRATION RATE: 16 BRPM | DIASTOLIC BLOOD PRESSURE: 82 MMHG | WEIGHT: 134 LBS | HEIGHT: 64 IN | BODY MASS INDEX: 22.88 KG/M2 | OXYGEN SATURATION: 97 % | TEMPERATURE: 98 F | HEART RATE: 110 BPM | SYSTOLIC BLOOD PRESSURE: 114 MMHG

## 2022-12-01 DIAGNOSIS — C79.71 MALIGNANT NEOPLASM METASTATIC TO RIGHT ADRENAL GLAND: ICD-10-CM

## 2022-12-01 DIAGNOSIS — G89.3 CANCER RELATED PAIN: ICD-10-CM

## 2022-12-01 DIAGNOSIS — M87.9 OSTEONECROSIS: ICD-10-CM

## 2022-12-01 DIAGNOSIS — C79.51 METASTATIC CANCER TO BONE: ICD-10-CM

## 2022-12-01 DIAGNOSIS — C34.92 MALIGNANT NEOPLASM OF LEFT LUNG STAGE 4: Primary | ICD-10-CM

## 2022-12-01 LAB
ALBUMIN SERPL BCP-MCNC: 3.5 G/DL (ref 3.4–5)
ALBUMIN/GLOBULIN RATIO: 0.7 RATIO (ref 1.1–1.8)
ALP SERPL-CCNC: 85 U/L (ref 46–116)
ALT SERPL W P-5'-P-CCNC: 24 U/L (ref 12–78)
ANION GAP SERPL CALC-SCNC: 5 MMOL/L (ref 3–11)
AST SERPL-CCNC: 24 U/L (ref 15–37)
BASOPHILS NFR BLD: 0.3 % (ref 0–3)
BILIRUB SERPL-MCNC: 0.4 MG/DL (ref 0–1)
BUN SERPL-MCNC: 12 MG/DL (ref 7–18)
BUN/CREAT SERPL: 15.18 RATIO (ref 7–18)
CALCIUM SERPL-MCNC: 10.8 MG/DL (ref 8.8–10.5)
CHLORIDE SERPL-SCNC: 102 MMOL/L (ref 100–108)
CO2 SERPL-SCNC: 31 MMOL/L (ref 21–32)
CREAT SERPL-MCNC: 0.79 MG/DL (ref 0.7–1.3)
EOSINOPHIL NFR BLD: 2.2 % (ref 1–3)
ERYTHROCYTE [DISTWIDTH] IN BLOOD BY AUTOMATED COUNT: 14.8 % (ref 12.5–18)
GFR ESTIMATION: > 60
GLOBULIN: 5 G/DL (ref 2.3–3.5)
GLUCOSE SERPL-MCNC: 132 MG/DL (ref 70–110)
HCT VFR BLD AUTO: 38.7 % (ref 42–52)
HGB BLD-MCNC: 12.5 G/DL (ref 14–18)
LYMPHOCYTES NFR BLD: 34.1 % (ref 25–40)
MCH RBC QN AUTO: 30.2 PG (ref 27–31.2)
MCHC RBC AUTO-ENTMCNC: 32.3 G/DL (ref 31.8–35.4)
MCV RBC AUTO: 93.5 FL (ref 80–97)
MONOCYTES NFR BLD: 6.4 % (ref 1–15)
NEUTROPHILS # BLD AUTO: 4.08 10*3/UL (ref 1.8–7.7)
NEUTROPHILS NFR BLD: 56.7 % (ref 37–80)
NUCLEATED RED BLOOD CELLS: 0 %
PLATELETS: 187 10*3/UL (ref 142–424)
POTASSIUM SERPL-SCNC: 3.8 MMOL/L (ref 3.6–5.2)
PROT SERPL-MCNC: 8.5 G/DL (ref 6.4–8.2)
RBC # BLD AUTO: 4.14 10*6/UL (ref 4.7–6.1)
SODIUM BLD-SCNC: 138 MMOL/L (ref 135–145)
TSH SERPL DL<=0.005 MIU/L-ACNC: 0 UIU/ML (ref 0.36–3.74)
WBC # BLD: 7.2 10*3/UL (ref 4.6–10.2)

## 2022-12-01 PROCEDURE — 1160F RVW MEDS BY RX/DR IN RCRD: CPT | Mod: CPTII,S$GLB,, | Performed by: NURSE PRACTITIONER

## 2022-12-01 PROCEDURE — 1159F PR MEDICATION LIST DOCUMENTED IN MEDICAL RECORD: ICD-10-PCS | Mod: CPTII,S$GLB,, | Performed by: NURSE PRACTITIONER

## 2022-12-01 PROCEDURE — 1159F MED LIST DOCD IN RCRD: CPT | Mod: CPTII,S$GLB,, | Performed by: NURSE PRACTITIONER

## 2022-12-01 PROCEDURE — 3074F SYST BP LT 130 MM HG: CPT | Mod: CPTII,S$GLB,, | Performed by: NURSE PRACTITIONER

## 2022-12-01 PROCEDURE — 3079F PR MOST RECENT DIASTOLIC BLOOD PRESSURE 80-89 MM HG: ICD-10-PCS | Mod: CPTII,S$GLB,, | Performed by: NURSE PRACTITIONER

## 2022-12-01 PROCEDURE — 3008F BODY MASS INDEX DOCD: CPT | Mod: CPTII,S$GLB,, | Performed by: NURSE PRACTITIONER

## 2022-12-01 PROCEDURE — 3079F DIAST BP 80-89 MM HG: CPT | Mod: CPTII,S$GLB,, | Performed by: NURSE PRACTITIONER

## 2022-12-01 PROCEDURE — 1160F PR REVIEW ALL MEDS BY PRESCRIBER/CLIN PHARMACIST DOCUMENTED: ICD-10-PCS | Mod: CPTII,S$GLB,, | Performed by: NURSE PRACTITIONER

## 2022-12-01 PROCEDURE — 99215 PR OFFICE/OUTPT VISIT, EST, LEVL V, 40-54 MIN: ICD-10-PCS | Mod: S$GLB,,, | Performed by: NURSE PRACTITIONER

## 2022-12-01 PROCEDURE — 3074F PR MOST RECENT SYSTOLIC BLOOD PRESSURE < 130 MM HG: ICD-10-PCS | Mod: CPTII,S$GLB,, | Performed by: NURSE PRACTITIONER

## 2022-12-01 PROCEDURE — 3008F PR BODY MASS INDEX (BMI) DOCUMENTED: ICD-10-PCS | Mod: CPTII,S$GLB,, | Performed by: NURSE PRACTITIONER

## 2022-12-01 PROCEDURE — 99215 OFFICE O/P EST HI 40 MIN: CPT | Mod: S$GLB,,, | Performed by: NURSE PRACTITIONER

## 2022-12-01 RX ORDER — MORPHINE SULFATE 15 MG/1
15 TABLET, FILM COATED, EXTENDED RELEASE ORAL EVERY 8 HOURS PRN
Qty: 60 TABLET | Refills: 0 | Status: SHIPPED | OUTPATIENT
Start: 2022-12-01 | End: 2023-01-30

## 2022-12-01 RX ORDER — SODIUM CHLORIDE 0.9 % (FLUSH) 0.9 %
10 SYRINGE (ML) INJECTION
Status: CANCELLED | OUTPATIENT
Start: 2022-12-01

## 2022-12-01 RX ORDER — HEPARIN 100 UNIT/ML
500 SYRINGE INTRAVENOUS
Status: CANCELLED | OUTPATIENT
Start: 2022-12-01

## 2022-12-01 RX ORDER — OXYCODONE AND ACETAMINOPHEN 7.5; 325 MG/1; MG/1
1 TABLET ORAL EVERY 8 HOURS PRN
Qty: 90 TABLET | Refills: 0 | Status: SHIPPED | OUTPATIENT
Start: 2022-12-01 | End: 2023-01-13 | Stop reason: SDUPTHER
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
## 2022-12-02 ENCOUNTER — TELEPHONE (OUTPATIENT)
Dept: HEMATOLOGY/ONCOLOGY | Facility: CLINIC | Age: 57
End: 2022-12-02
Payer: MEDICAID

## 2022-12-19 DIAGNOSIS — N62 GYNECOMASTIA, MALE: Primary | ICD-10-CM

## 2022-12-19 RX ORDER — SULFAMETHOXAZOLE AND TRIMETHOPRIM 800; 160 MG/1; MG/1
1 TABLET ORAL 2 TIMES DAILY
Qty: 20 TABLET | Refills: 0 | Status: SHIPPED | OUTPATIENT
Start: 2022-12-19 | End: 2022-12-29

## 2022-12-19 NOTE — PROGRESS NOTES
Notified by Dr. Gordillo patient is complaining of painful swollen gynecomastia to left subareolar.  Will send out 10 days of antibiotics and evaluate at next visit

## 2023-01-11 DIAGNOSIS — N62 GYNECOMASTIA, MALE: ICD-10-CM

## 2023-01-11 DIAGNOSIS — C34.92 MALIGNANT NEOPLASM OF LEFT LUNG STAGE 4: Primary | ICD-10-CM

## 2023-01-12 ENCOUNTER — OFFICE VISIT (OUTPATIENT)
Dept: HEMATOLOGY/ONCOLOGY | Facility: CLINIC | Age: 58
End: 2023-01-12
Payer: MEDICAID

## 2023-01-12 VITALS
WEIGHT: 123 LBS | DIASTOLIC BLOOD PRESSURE: 78 MMHG | TEMPERATURE: 98 F | HEIGHT: 64 IN | BODY MASS INDEX: 21 KG/M2 | HEART RATE: 65 BPM | OXYGEN SATURATION: 94 % | SYSTOLIC BLOOD PRESSURE: 111 MMHG | RESPIRATION RATE: 16 BRPM

## 2023-01-12 DIAGNOSIS — N63.42 SUBAREOLAR MASS OF LEFT BREAST: ICD-10-CM

## 2023-01-12 DIAGNOSIS — T45.1X5A CINV (CHEMOTHERAPY-INDUCED NAUSEA AND VOMITING): ICD-10-CM

## 2023-01-12 DIAGNOSIS — N63.42 SUBAREOLAR MASS OF LEFT BREAST: Primary | ICD-10-CM

## 2023-01-12 DIAGNOSIS — C79.71 MALIGNANT NEOPLASM METASTATIC TO RIGHT ADRENAL GLAND: ICD-10-CM

## 2023-01-12 DIAGNOSIS — C34.92 MALIGNANT NEOPLASM OF LEFT LUNG STAGE 4: ICD-10-CM

## 2023-01-12 DIAGNOSIS — R11.2 CINV (CHEMOTHERAPY-INDUCED NAUSEA AND VOMITING): ICD-10-CM

## 2023-01-12 DIAGNOSIS — C34.92 MALIGNANT NEOPLASM OF LEFT LUNG STAGE 4: Primary | ICD-10-CM

## 2023-01-12 LAB
ALBUMIN SERPL BCP-MCNC: 3.6 G/DL (ref 3.4–5)
ALBUMIN/GLOBULIN RATIO: 0.72 RATIO (ref 1.1–1.8)
ALP SERPL-CCNC: 86 U/L (ref 46–116)
ALT SERPL W P-5'-P-CCNC: 34 U/L (ref 12–78)
ANION GAP SERPL CALC-SCNC: 8 MMOL/L (ref 3–11)
AST SERPL-CCNC: 35 U/L (ref 15–37)
BANDS: 3 % (ref 0–5)
BILIRUB SERPL-MCNC: 0.4 MG/DL (ref 0–1)
BUN SERPL-MCNC: 13 MG/DL (ref 7–18)
BUN/CREAT SERPL: 15.29 RATIO (ref 7–18)
CALCIUM SERPL-MCNC: 10.9 MG/DL (ref 8.8–10.5)
CELLS COUNTED: 100
CHLORIDE SERPL-SCNC: 98 MMOL/L (ref 100–108)
CO2 SERPL-SCNC: 28 MMOL/L (ref 21–32)
CREAT SERPL-MCNC: 0.85 MG/DL (ref 0.7–1.3)
ERYTHROCYTE [DISTWIDTH] IN BLOOD BY AUTOMATED COUNT: 15.2 % (ref 12.5–18)
GFR ESTIMATION: > 60
GLOBULIN: 5 G/DL (ref 2.3–3.5)
GLUCOSE SERPL-MCNC: 109 MG/DL (ref 70–110)
HCT VFR BLD AUTO: 38.6 % (ref 42–52)
HGB BLD-MCNC: 12.8 G/DL (ref 14–18)
LYMPHOCYTES NFR BLD: 10 % (ref 25–40)
MCH RBC QN AUTO: 31.1 PG (ref 27–31.2)
MCHC RBC AUTO-ENTMCNC: 33.2 G/DL (ref 31.8–35.4)
MCV RBC AUTO: 93.7 FL (ref 80–97)
MONOCYTES NFR BLD: 6 % (ref 1–15)
NEUTROPHILS # BLD AUTO: 4.1 10*3/UL (ref 1.8–7.7)
NEUTROPHILS NFR BLD: 81 % (ref 37–80)
NUCLEATED RED BLOOD CELLS: 0 %
PLATELETS: 140 10*3/UL (ref 142–424)
POTASSIUM SERPL-SCNC: 4.4 MMOL/L (ref 3.6–5.2)
PROT SERPL-MCNC: 8.6 G/DL (ref 6.4–8.2)
RBC # BLD AUTO: 4.12 10*6/UL (ref 4.7–6.1)
RBC MORPH BLD: ABNORMAL
SMALL PLATELETS BLD QL SMEAR: ADEQUATE
SODIUM BLD-SCNC: 134 MMOL/L (ref 135–145)
WBC # BLD: 4.9 10*3/UL (ref 4.6–10.2)

## 2023-01-12 PROCEDURE — 99215 PR OFFICE/OUTPT VISIT, EST, LEVL V, 40-54 MIN: ICD-10-PCS | Mod: S$GLB,,, | Performed by: NURSE PRACTITIONER

## 2023-01-12 PROCEDURE — 1159F PR MEDICATION LIST DOCUMENTED IN MEDICAL RECORD: ICD-10-PCS | Mod: CPTII,S$GLB,, | Performed by: NURSE PRACTITIONER

## 2023-01-12 PROCEDURE — 3008F BODY MASS INDEX DOCD: CPT | Mod: CPTII,S$GLB,, | Performed by: NURSE PRACTITIONER

## 2023-01-12 PROCEDURE — 3078F DIAST BP <80 MM HG: CPT | Mod: CPTII,S$GLB,, | Performed by: NURSE PRACTITIONER

## 2023-01-12 PROCEDURE — 1160F PR REVIEW ALL MEDS BY PRESCRIBER/CLIN PHARMACIST DOCUMENTED: ICD-10-PCS | Mod: CPTII,S$GLB,, | Performed by: NURSE PRACTITIONER

## 2023-01-12 PROCEDURE — 99215 OFFICE O/P EST HI 40 MIN: CPT | Mod: S$GLB,,, | Performed by: NURSE PRACTITIONER

## 2023-01-12 PROCEDURE — 1159F MED LIST DOCD IN RCRD: CPT | Mod: CPTII,S$GLB,, | Performed by: NURSE PRACTITIONER

## 2023-01-12 PROCEDURE — 3074F SYST BP LT 130 MM HG: CPT | Mod: CPTII,S$GLB,, | Performed by: NURSE PRACTITIONER

## 2023-01-12 PROCEDURE — 3008F PR BODY MASS INDEX (BMI) DOCUMENTED: ICD-10-PCS | Mod: CPTII,S$GLB,, | Performed by: NURSE PRACTITIONER

## 2023-01-12 PROCEDURE — 1160F RVW MEDS BY RX/DR IN RCRD: CPT | Mod: CPTII,S$GLB,, | Performed by: NURSE PRACTITIONER

## 2023-01-12 PROCEDURE — 3074F PR MOST RECENT SYSTOLIC BLOOD PRESSURE < 130 MM HG: ICD-10-PCS | Mod: CPTII,S$GLB,, | Performed by: NURSE PRACTITIONER

## 2023-01-12 PROCEDURE — 3078F PR MOST RECENT DIASTOLIC BLOOD PRESSURE < 80 MM HG: ICD-10-PCS | Mod: CPTII,S$GLB,, | Performed by: NURSE PRACTITIONER

## 2023-01-12 RX ORDER — ONDANSETRON HYDROCHLORIDE 8 MG/1
8 TABLET, FILM COATED ORAL EVERY 8 HOURS PRN
Qty: 30 TABLET | Refills: 2 | Status: SHIPPED | OUTPATIENT
Start: 2023-01-12 | End: 2024-01-12
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
## 2023-01-13 DIAGNOSIS — C79.51 METASTATIC CANCER TO BONE: ICD-10-CM

## 2023-01-13 DIAGNOSIS — C34.92 MALIGNANT NEOPLASM OF LEFT LUNG STAGE 4: ICD-10-CM

## 2023-01-13 DIAGNOSIS — G89.3 CANCER RELATED PAIN: ICD-10-CM

## 2023-01-13 PROBLEM — R11.2 CINV (CHEMOTHERAPY-INDUCED NAUSEA AND VOMITING): Status: ACTIVE | Noted: 2023-01-13

## 2023-01-13 PROBLEM — N63.42 SUBAREOLAR MASS OF LEFT BREAST: Status: ACTIVE | Noted: 2023-01-13

## 2023-01-13 PROBLEM — T45.1X5A CINV (CHEMOTHERAPY-INDUCED NAUSEA AND VOMITING): Status: ACTIVE | Noted: 2023-01-13

## 2023-01-13 RX ORDER — OXYCODONE AND ACETAMINOPHEN 7.5; 325 MG/1; MG/1
1 TABLET ORAL EVERY 8 HOURS PRN
Qty: 90 TABLET | Refills: 0 | Status: SHIPPED | OUTPATIENT
Start: 2023-01-13 | End: 2023-03-16 | Stop reason: SDUPTHER

## 2023-01-23 ENCOUNTER — TELEPHONE (OUTPATIENT)
Dept: SURGERY | Facility: CLINIC | Age: 58
End: 2023-01-23

## 2023-01-23 DIAGNOSIS — C79.51 METASTATIC CANCER TO BONE: ICD-10-CM

## 2023-01-23 DIAGNOSIS — C34.92 MALIGNANT NEOPLASM OF LEFT LUNG STAGE 4: ICD-10-CM

## 2023-01-23 DIAGNOSIS — G89.3 CANCER RELATED PAIN: ICD-10-CM

## 2023-01-23 RX ORDER — OXYCODONE AND ACETAMINOPHEN 7.5; 325 MG/1; MG/1
1 TABLET ORAL EVERY 8 HOURS PRN
Qty: 90 TABLET | Refills: 0 | Status: CANCELLED | OUTPATIENT
Start: 2023-01-23 | End: 2024-01-23

## 2023-01-23 NOTE — TELEPHONE ENCOUNTER
----- Message from Pepe Kuhn sent at 1/23/2023  1:30 PM CST -----  Contact: self  Pt called and asked for a call back. Pt did not know he had an appt today. Please call [phone]

## 2023-01-30 ENCOUNTER — OFFICE VISIT (OUTPATIENT)
Dept: SURGERY | Facility: CLINIC | Age: 58
End: 2023-01-30
Payer: MEDICAID

## 2023-01-30 DIAGNOSIS — N62 GYNECOMASTIA, MALE: Primary | ICD-10-CM

## 2023-01-30 DIAGNOSIS — N63.42 SUBAREOLAR MASS OF LEFT BREAST: ICD-10-CM

## 2023-01-30 LAB
ANION GAP SERPL CALC-SCNC: 5 MMOL/L (ref 3–11)
BASOPHILS NFR BLD: 0.4 % (ref 0–3)
BUN SERPL-MCNC: 12 MG/DL (ref 7–18)
BUN/CREAT SERPL: 16.9 RATIO (ref 7–18)
CALCIUM SERPL-MCNC: 10.4 MG/DL (ref 8.8–10.5)
CHLORIDE SERPL-SCNC: 102 MMOL/L (ref 100–108)
CO2 SERPL-SCNC: 32 MMOL/L (ref 21–32)
CREAT SERPL-MCNC: 0.71 MG/DL (ref 0.7–1.3)
EOSINOPHIL NFR BLD: 2.1 % (ref 1–3)
ERYTHROCYTE [DISTWIDTH] IN BLOOD BY AUTOMATED COUNT: 15 % (ref 12.5–18)
GFR ESTIMATION: > 60
GLUCOSE SERPL-MCNC: 116 MG/DL (ref 70–110)
HCT VFR BLD AUTO: 36.3 % (ref 42–52)
HGB BLD-MCNC: 12.4 G/DL (ref 14–18)
LYMPHOCYTES NFR BLD: 23.1 % (ref 25–40)
MCH RBC QN AUTO: 31.7 PG (ref 27–31.2)
MCHC RBC AUTO-ENTMCNC: 34.2 G/DL (ref 31.8–35.4)
MCV RBC AUTO: 92.8 FL (ref 80–97)
MONOCYTES NFR BLD: 9.3 % (ref 1–15)
NEUTROPHILS # BLD AUTO: 3.43 10*3/UL (ref 1.8–7.7)
NEUTROPHILS NFR BLD: 64.7 % (ref 37–80)
NUCLEATED RED BLOOD CELLS: 0 %
PLATELETS: 100 10*3/UL (ref 142–424)
POTASSIUM SERPL-SCNC: 3.4 MMOL/L (ref 3.6–5.2)
RBC # BLD AUTO: 3.91 10*6/UL (ref 4.7–6.1)
SODIUM BLD-SCNC: 139 MMOL/L (ref 135–145)
WBC # BLD: 5.3 10*3/UL (ref 4.6–10.2)

## 2023-01-30 PROCEDURE — 1159F PR MEDICATION LIST DOCUMENTED IN MEDICAL RECORD: ICD-10-PCS | Mod: CPTII,S$GLB,, | Performed by: SURGERY

## 2023-01-30 PROCEDURE — 1160F RVW MEDS BY RX/DR IN RCRD: CPT | Mod: CPTII,S$GLB,, | Performed by: SURGERY

## 2023-01-30 PROCEDURE — 99203 OFFICE O/P NEW LOW 30 MIN: CPT | Mod: S$GLB,,, | Performed by: SURGERY

## 2023-01-30 PROCEDURE — 1160F PR REVIEW ALL MEDS BY PRESCRIBER/CLIN PHARMACIST DOCUMENTED: ICD-10-PCS | Mod: CPTII,S$GLB,, | Performed by: SURGERY

## 2023-01-30 PROCEDURE — 99203 PR OFFICE/OUTPT VISIT, NEW, LEVL III, 30-44 MIN: ICD-10-PCS | Mod: S$GLB,,, | Performed by: SURGERY

## 2023-01-30 PROCEDURE — 1159F MED LIST DOCD IN RCRD: CPT | Mod: CPTII,S$GLB,, | Performed by: SURGERY

## 2023-01-30 RX ORDER — HYDROCODONE BITARTRATE AND ACETAMINOPHEN 5; 325 MG/1; MG/1
1 TABLET ORAL EVERY 6 HOURS PRN
COMMUNITY
Start: 2023-01-11 | End: 2023-03-16

## 2023-01-30 NOTE — PROGRESS NOTES
History & Physical    SUBJECTIVE:     History of Present Illness:    57-year-old male referred by Shelby Swain NP for evaluation and treatment of left subareolar breast mass.  Patient is undergoing chemotherapy for stage IV lung cancer that was diagnosed 5 years ago.  He gets chemotherapy approximately every 6 weeks.  For the last several months he is complained of discomfort in the left nipple areola region to the point that it is causing him to not be able to sleep well because of his shirt and sheets rubbing across it.  He is had a similar treatment for gynecomastia in the right breast several years ago treated with subareolar mastectomy.  Ultrasound was done which showed a ill-defined hypoechoic mass in the subareolar left breast without significant margins or shadowing this is consistent with gynecomastia.  He continues to be quite symptomatic related to the gynecomastia.    Chief Complaint   Patient presents with    Breast Problem     Left subareolar mass         Review of patient's allergies indicates:  Review of patient's allergies indicates:   Allergen Reactions    Lisinopril Edema     Swelling to face        Current Outpatient Medications on File Prior to Visit   Medication Sig Dispense Refill    calcium carbonate (OS-EVANGELINA) 600 mg calcium (1,500 mg) Tab Take 600 mg by mouth once daily.      HYDROcodone-acetaminophen (NORCO) 5-325 mg per tablet Take 1 tablet by mouth every 6 (six) hours as needed.      ondansetron (ZOFRAN) 8 MG tablet Take 1 tablet (8 mg total) by mouth every 8 (eight) hours as needed for Nausea. 30 tablet 2    oxyCODONE-acetaminophen (PERCOCET) 7.5-325 mg per tablet Take 1 tablet by mouth every 8 (eight) hours as needed for Pain. 90 tablet 0    SYNTHROID 300 mcg Tab Take 1 tablet (300 mcg total) by mouth before breakfast. 30 tablet 11    [DISCONTINUED] amoxicillin-clavulanate 875-125mg (AUGMENTIN) 875-125 mg per tablet       [DISCONTINUED] aspirin (ECOTRIN) 81 MG EC tablet Take 81 mg by  mouth once daily.      [DISCONTINUED] morphine (MS CONTIN) 15 MG 12 hr tablet Take 1 tablet (15 mg total) by mouth every 8 (eight) hours as needed for Pain. 60 tablet 0     Current Facility-Administered Medications on File Prior to Visit   Medication Dose Route Frequency Provider Last Rate Last Admin    alteplase injection 2 mg  2 mg Intra-Catheter PRN Faith Swain NP        heparin, porcine (PF) 100 unit/mL injection flush 500 Units  500 Units Intravenous PRN Faith Swain NP        pembrolizumab (KEYTRUDA) 200 mg in sodium chloride 0.9% 100 mL chemo infusion  200 mg Intravenous 1 time in Clinic/HOD Faith Swain NP        sodium chloride 0.9% 100 mL flush bag   Intravenous 1 time in Clinic/HOD Faith Swain NP        sodium chloride 0.9% flush 10 mL  10 mL Intravenous PRN Faith Swain NP           Past Medical History:   Diagnosis Date    Bone cancer     Cancer     Hypertension 12/24/2019    Lung cancer     Thyroid disease      Past Surgical History:   Procedure Laterality Date    BACK SURGERY      FINGER SURGERY      HERNIA REPAIR      LUNG BIOPSY      PORTACATH PLACEMENT       Family History   Problem Relation Age of Onset    Bladder Cancer Mother     Cancer Father     Rectal cancer Brother     Lung cancer Brother     Pancreatic cancer Other     Esophageal cancer Other        Social History     Socioeconomic History    Marital status: Single   Tobacco Use    Smoking status: Every Day     Packs/day: 0.25     Years: 30.00     Pack years: 7.50     Types: Cigarettes    Smokeless tobacco: Never   Substance and Sexual Activity    Alcohol use: Yes     Alcohol/week: 2.0 standard drinks     Types: 2 Cans of beer per week    Drug use: No          Review of Systems   Constitutional:  Positive for weight loss.   Cardiovascular: Negative.    Gastrointestinal: Negative.    Genitourinary: Negative.    Musculoskeletal:  Positive for joint pain.   Neurological: Negative.    Endo/Heme/Allergies: Negative.    Psychiatric/Behavioral:  Negative.       OBJECTIVE:     There were no vitals filed for this visit.              Physical Exam:  Physical Exam  Constitutional:       Appearance: Normal appearance.   HENT:      Head: Normocephalic.   Eyes:      Pupils: Pupils are equal, round, and reactive to light.   Cardiovascular:      Rate and Rhythm: Normal rate and regular rhythm.   Pulmonary:      Effort: Pulmonary effort is normal.      Breath sounds: Normal breath sounds.   Chest:          Comments: Ill-defined mass palpable left subareolar area with tenderness.  Mass feels to be about 2 cm in size extending just to the left of the areola.  Skin normal  Abdominal:      General: Abdomen is flat. Bowel sounds are normal.      Palpations: Abdomen is soft.   Musculoskeletal:         General: Normal range of motion.   Skin:     General: Skin is warm and dry.   Neurological:      General: No focal deficit present.      Mental Status: He is alert and oriented to person, place, and time.      Cranial Nerves: No cranial nerve deficit.   Psychiatric:         Mood and Affect: Mood normal.         Behavior: Behavior normal.         Thought Content: Thought content normal.         Judgment: Judgment normal.           ASSESSMENT/PLAN:   Left breast gynecomastia, symptomatic  PLAN:  Discussed treatment options to include observation versus mastectomy for gynecomastia.  Patient states that because of his symptoms he wants to pursue surgical excision like he is had in the past on right side to alleviate his symptoms.  Surgery will be scheduled for February 9th

## 2023-01-31 ENCOUNTER — TELEPHONE (OUTPATIENT)
Dept: HEMATOLOGY/ONCOLOGY | Facility: CLINIC | Age: 58
End: 2023-01-31
Payer: MEDICAID

## 2023-01-31 NOTE — TELEPHONE ENCOUNTER
Spoke with the patient. States Dr Ortega wants to do surgery 2/9/23 (per patient). I stated that he needed to keep his appt with us this Thursday to discuss. Patient states understanding. TTRN

## 2023-02-01 DIAGNOSIS — N63.42 SUBAREOLAR MASS OF LEFT BREAST: ICD-10-CM

## 2023-02-01 DIAGNOSIS — C34.92 MALIGNANT NEOPLASM OF LEFT LUNG STAGE 4: Primary | ICD-10-CM

## 2023-02-01 DIAGNOSIS — E03.9 HYPOTHYROIDISM, UNSPECIFIED TYPE: ICD-10-CM

## 2023-02-02 ENCOUNTER — OFFICE VISIT (OUTPATIENT)
Dept: HEMATOLOGY/ONCOLOGY | Facility: CLINIC | Age: 58
End: 2023-02-02
Payer: MEDICAID

## 2023-02-02 VITALS
WEIGHT: 122 LBS | DIASTOLIC BLOOD PRESSURE: 72 MMHG | HEART RATE: 92 BPM | BODY MASS INDEX: 20.83 KG/M2 | OXYGEN SATURATION: 97 % | HEIGHT: 64 IN | RESPIRATION RATE: 15 BRPM | TEMPERATURE: 98 F | SYSTOLIC BLOOD PRESSURE: 107 MMHG

## 2023-02-02 DIAGNOSIS — C34.92 MALIGNANT NEOPLASM OF LEFT LUNG STAGE 4: Primary | ICD-10-CM

## 2023-02-02 DIAGNOSIS — N62 GYNECOMASTIA, MALE: ICD-10-CM

## 2023-02-02 DIAGNOSIS — C79.51 METASTATIC CANCER TO BONE: ICD-10-CM

## 2023-02-02 DIAGNOSIS — C79.71 MALIGNANT NEOPLASM METASTATIC TO RIGHT ADRENAL GLAND: ICD-10-CM

## 2023-02-02 LAB
ALBUMIN SERPL BCP-MCNC: 3.5 G/DL (ref 3.4–5)
ALBUMIN/GLOBULIN RATIO: 0.74 RATIO (ref 1.1–1.8)
ALP SERPL-CCNC: 107 U/L (ref 46–116)
ALT SERPL W P-5'-P-CCNC: 24 U/L (ref 12–78)
ANION GAP SERPL CALC-SCNC: 5 MMOL/L (ref 3–11)
AST SERPL-CCNC: 24 U/L (ref 15–37)
ATYPICAL LYMPHOCYTES: 18 % (ref 0–0)
BANDS: 1 % (ref 0–5)
BILIRUB SERPL-MCNC: 0.6 MG/DL (ref 0–1)
BUN SERPL-MCNC: 11 MG/DL (ref 7–18)
BUN/CREAT SERPL: 14.28 RATIO (ref 7–18)
CALCIUM SERPL-MCNC: 10.8 MG/DL (ref 8.8–10.5)
CELLS COUNTED: 100
CHLORIDE SERPL-SCNC: 104 MMOL/L (ref 100–108)
CO2 SERPL-SCNC: 30 MMOL/L (ref 21–32)
CREAT SERPL-MCNC: 0.77 MG/DL (ref 0.7–1.3)
EOSINOPHIL NFR BLD: 3 % (ref 1–3)
ERYTHROCYTE [DISTWIDTH] IN BLOOD BY AUTOMATED COUNT: 15.1 % (ref 12.5–18)
GFR ESTIMATION: > 60
GLOBULIN: 4.7 G/DL (ref 2.3–3.5)
GLUCOSE SERPL-MCNC: 109 MG/DL (ref 70–110)
HCT VFR BLD AUTO: 37.6 % (ref 42–52)
HGB BLD-MCNC: 13 G/DL (ref 14–18)
LYMPHOCYTES NFR BLD: 19 % (ref 25–40)
MCH RBC QN AUTO: 31.8 PG (ref 27–31.2)
MCHC RBC AUTO-ENTMCNC: 34.6 G/DL (ref 31.8–35.4)
MCV RBC AUTO: 91.9 FL (ref 80–97)
MONOCYTES NFR BLD: 6 % (ref 1–15)
NEUTROPHILS # BLD AUTO: 2.9 10*3/UL (ref 1.8–7.7)
NEUTROPHILS NFR BLD: 53 % (ref 37–80)
NUCLEATED RED BLOOD CELLS: 0 %
PLATELETS: 132 10*3/UL (ref 142–424)
POTASSIUM SERPL-SCNC: 3.8 MMOL/L (ref 3.6–5.2)
PROT SERPL-MCNC: 8.2 G/DL (ref 6.4–8.2)
RBC # BLD AUTO: 4.09 10*6/UL (ref 4.7–6.1)
RBC MORPH BLD: ABNORMAL
SMALL PLATELETS BLD QL SMEAR: ADEQUATE
SODIUM BLD-SCNC: 139 MMOL/L (ref 135–145)
TSH SERPL DL<=0.005 MIU/L-ACNC: 0.01 UIU/ML (ref 0.36–3.74)
WBC # BLD: 5.4 10*3/UL (ref 4.6–10.2)

## 2023-02-02 PROCEDURE — 3008F BODY MASS INDEX DOCD: CPT | Mod: CPTII,S$GLB,, | Performed by: NURSE PRACTITIONER

## 2023-02-02 PROCEDURE — 3008F PR BODY MASS INDEX (BMI) DOCUMENTED: ICD-10-PCS | Mod: CPTII,S$GLB,, | Performed by: NURSE PRACTITIONER

## 2023-02-02 PROCEDURE — 1159F PR MEDICATION LIST DOCUMENTED IN MEDICAL RECORD: ICD-10-PCS | Mod: CPTII,S$GLB,, | Performed by: NURSE PRACTITIONER

## 2023-02-02 PROCEDURE — 1160F PR REVIEW ALL MEDS BY PRESCRIBER/CLIN PHARMACIST DOCUMENTED: ICD-10-PCS | Mod: CPTII,S$GLB,, | Performed by: NURSE PRACTITIONER

## 2023-02-02 PROCEDURE — 3074F SYST BP LT 130 MM HG: CPT | Mod: CPTII,S$GLB,, | Performed by: NURSE PRACTITIONER

## 2023-02-02 PROCEDURE — 3074F PR MOST RECENT SYSTOLIC BLOOD PRESSURE < 130 MM HG: ICD-10-PCS | Mod: CPTII,S$GLB,, | Performed by: NURSE PRACTITIONER

## 2023-02-02 PROCEDURE — 3078F DIAST BP <80 MM HG: CPT | Mod: CPTII,S$GLB,, | Performed by: NURSE PRACTITIONER

## 2023-02-02 PROCEDURE — 1159F MED LIST DOCD IN RCRD: CPT | Mod: CPTII,S$GLB,, | Performed by: NURSE PRACTITIONER

## 2023-02-02 PROCEDURE — 99215 PR OFFICE/OUTPT VISIT, EST, LEVL V, 40-54 MIN: ICD-10-PCS | Mod: S$GLB,,, | Performed by: NURSE PRACTITIONER

## 2023-02-02 PROCEDURE — 3078F PR MOST RECENT DIASTOLIC BLOOD PRESSURE < 80 MM HG: ICD-10-PCS | Mod: CPTII,S$GLB,, | Performed by: NURSE PRACTITIONER

## 2023-02-02 PROCEDURE — 1160F RVW MEDS BY RX/DR IN RCRD: CPT | Mod: CPTII,S$GLB,, | Performed by: NURSE PRACTITIONER

## 2023-02-02 PROCEDURE — 99215 OFFICE O/P EST HI 40 MIN: CPT | Mod: S$GLB,,, | Performed by: NURSE PRACTITIONER

## 2023-02-02 RX ORDER — HEPARIN 100 UNIT/ML
500 SYRINGE INTRAVENOUS
Status: CANCELLED | OUTPATIENT
Start: 2023-02-02

## 2023-02-02 RX ORDER — SODIUM CHLORIDE 0.9 % (FLUSH) 0.9 %
10 SYRINGE (ML) INJECTION
Status: CANCELLED | OUTPATIENT
Start: 2023-02-02
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
## 2023-02-06 DIAGNOSIS — C34.92 MALIGNANT NEOPLASM OF LEFT LUNG STAGE 4: ICD-10-CM

## 2023-02-06 DIAGNOSIS — C79.51 METASTATIC CANCER TO BONE: ICD-10-CM

## 2023-02-06 DIAGNOSIS — G89.3 CANCER RELATED PAIN: ICD-10-CM

## 2023-02-06 RX ORDER — OXYCODONE AND ACETAMINOPHEN 7.5; 325 MG/1; MG/1
1 TABLET ORAL EVERY 8 HOURS PRN
Qty: 90 TABLET | Refills: 0 | Status: CANCELLED | OUTPATIENT
Start: 2023-02-06 | End: 2024-02-06

## 2023-02-09 ENCOUNTER — OUTSIDE PLACE OF SERVICE (OUTPATIENT)
Dept: SURGERY | Facility: CLINIC | Age: 58
End: 2023-02-09

## 2023-02-09 PROCEDURE — 19300 PR MASTECTOMY FOR GYNECOMASTIA: ICD-10-PCS | Mod: LT,,, | Performed by: SURGERY

## 2023-02-09 PROCEDURE — 19300 MASTECTOMY FOR GYNECOMASTIA: CPT | Mod: LT,,, | Performed by: SURGERY

## 2023-02-10 LAB — SPECIMEN TO PATHOLOGY: NORMAL

## 2023-02-20 ENCOUNTER — OFFICE VISIT (OUTPATIENT)
Dept: SURGERY | Facility: CLINIC | Age: 58
End: 2023-02-20
Payer: MEDICAID

## 2023-02-20 DIAGNOSIS — Z98.890 POST-OPERATIVE STATE: Primary | ICD-10-CM

## 2023-02-20 PROCEDURE — 1159F PR MEDICATION LIST DOCUMENTED IN MEDICAL RECORD: ICD-10-PCS | Mod: CPTII,S$GLB,, | Performed by: SURGERY

## 2023-02-20 PROCEDURE — 99024 POSTOP FOLLOW-UP VISIT: CPT | Mod: S$GLB,,, | Performed by: SURGERY

## 2023-02-20 PROCEDURE — 1160F RVW MEDS BY RX/DR IN RCRD: CPT | Mod: CPTII,S$GLB,, | Performed by: SURGERY

## 2023-02-20 PROCEDURE — 1160F PR REVIEW ALL MEDS BY PRESCRIBER/CLIN PHARMACIST DOCUMENTED: ICD-10-PCS | Mod: CPTII,S$GLB,, | Performed by: SURGERY

## 2023-02-20 PROCEDURE — 99024 PR POST-OP FOLLOW-UP VISIT: ICD-10-PCS | Mod: S$GLB,,, | Performed by: SURGERY

## 2023-02-20 PROCEDURE — 1159F MED LIST DOCD IN RCRD: CPT | Mod: CPTII,S$GLB,, | Performed by: SURGERY

## 2023-02-20 NOTE — PROGRESS NOTES
HPI:  Postop revisit status post left mastectomy for gynecomastia.  Path report confirms gynecomastia.    PHYSICAL EXAM:  Incision left breast is healing well with no redness or drainage noted.  Sutures removed without difficulty  ASSESSMENT:    Stable status post left mastectomy for gynecomastia  PLAN:      Revisit as needed

## 2023-03-10 DIAGNOSIS — C34.92 MALIGNANT NEOPLASM OF LEFT LUNG STAGE 4: Primary | ICD-10-CM

## 2023-03-16 ENCOUNTER — OFFICE VISIT (OUTPATIENT)
Dept: HEMATOLOGY/ONCOLOGY | Facility: CLINIC | Age: 58
End: 2023-03-16
Payer: MEDICAID

## 2023-03-16 VITALS
DIASTOLIC BLOOD PRESSURE: 82 MMHG | SYSTOLIC BLOOD PRESSURE: 120 MMHG | HEART RATE: 78 BPM | HEIGHT: 64 IN | BODY MASS INDEX: 22.53 KG/M2 | WEIGHT: 132 LBS | RESPIRATION RATE: 18 BRPM | OXYGEN SATURATION: 98 %

## 2023-03-16 DIAGNOSIS — G89.3 CANCER RELATED PAIN: ICD-10-CM

## 2023-03-16 DIAGNOSIS — E03.9 HYPOTHYROIDISM, UNSPECIFIED TYPE: ICD-10-CM

## 2023-03-16 DIAGNOSIS — C79.51 METASTATIC CANCER TO BONE: ICD-10-CM

## 2023-03-16 DIAGNOSIS — F17.200 SMOKER: Primary | ICD-10-CM

## 2023-03-16 DIAGNOSIS — C34.92 MALIGNANT NEOPLASM OF LEFT LUNG STAGE 4: ICD-10-CM

## 2023-03-16 LAB
ALBUMIN SERPL BCP-MCNC: 3.4 G/DL (ref 3.4–5)
ALBUMIN/GLOBULIN RATIO: 0.76 RATIO (ref 1.1–1.8)
ALP SERPL-CCNC: 118 U/L (ref 46–116)
ALT SERPL W P-5'-P-CCNC: 18 U/L (ref 12–78)
ANION GAP SERPL CALC-SCNC: 7 MMOL/L (ref 3–11)
AST SERPL-CCNC: 17 U/L (ref 15–37)
BASOPHILS NFR BLD: 0.3 % (ref 0–3)
BILIRUB SERPL-MCNC: 0.2 MG/DL (ref 0–1)
BUN SERPL-MCNC: 14 MG/DL (ref 7–18)
BUN/CREAT SERPL: 17.5 RATIO (ref 7–18)
CALCIUM SERPL-MCNC: 10.3 MG/DL (ref 8.8–10.5)
CHLORIDE SERPL-SCNC: 103 MMOL/L (ref 100–108)
CO2 SERPL-SCNC: 30 MMOL/L (ref 21–32)
CREAT SERPL-MCNC: 0.8 MG/DL (ref 0.7–1.3)
EOSINOPHIL NFR BLD: 3.3 % (ref 1–3)
ERYTHROCYTE [DISTWIDTH] IN BLOOD BY AUTOMATED COUNT: 13.9 % (ref 12.5–18)
GFR ESTIMATION: > 60
GLOBULIN: 4.5 G/DL (ref 2.3–3.5)
GLUCOSE SERPL-MCNC: 90 MG/DL (ref 70–110)
HCT VFR BLD AUTO: 36.7 % (ref 42–52)
HGB BLD-MCNC: 12.3 G/DL (ref 14–18)
LYMPHOCYTES NFR BLD: 25.6 % (ref 25–40)
MCH RBC QN AUTO: 32.5 PG (ref 27–31.2)
MCHC RBC AUTO-ENTMCNC: 33.5 G/DL (ref 31.8–35.4)
MCV RBC AUTO: 96.8 FL (ref 80–97)
MONOCYTES NFR BLD: 10.3 % (ref 1–15)
NEUTROPHILS # BLD AUTO: 3.52 10*3/UL (ref 1.8–7.7)
NEUTROPHILS NFR BLD: 60.3 % (ref 37–80)
NUCLEATED RED BLOOD CELLS: 0 %
PLATELETS: 124 10*3/UL (ref 142–424)
POTASSIUM SERPL-SCNC: 4.1 MMOL/L (ref 3.6–5.2)
PROT SERPL-MCNC: 7.9 G/DL (ref 6.4–8.2)
RBC # BLD AUTO: 3.79 10*6/UL (ref 4.7–6.1)
SODIUM BLD-SCNC: 140 MMOL/L (ref 135–145)
TSH SERPL DL<=0.005 MIU/L-ACNC: <0.01 UIU/ML (ref 0.36–3.74)
WBC # BLD: 5.8 10*3/UL (ref 4.6–10.2)

## 2023-03-16 PROCEDURE — 1159F MED LIST DOCD IN RCRD: CPT | Mod: CPTII,S$GLB,, | Performed by: NURSE PRACTITIONER

## 2023-03-16 PROCEDURE — 99215 OFFICE O/P EST HI 40 MIN: CPT | Mod: 25,S$GLB,, | Performed by: NURSE PRACTITIONER

## 2023-03-16 PROCEDURE — 3074F PR MOST RECENT SYSTOLIC BLOOD PRESSURE < 130 MM HG: ICD-10-PCS | Mod: CPTII,S$GLB,, | Performed by: NURSE PRACTITIONER

## 2023-03-16 PROCEDURE — 1160F PR REVIEW ALL MEDS BY PRESCRIBER/CLIN PHARMACIST DOCUMENTED: ICD-10-PCS | Mod: CPTII,S$GLB,, | Performed by: NURSE PRACTITIONER

## 2023-03-16 PROCEDURE — 99406 PR TOBACCO USE CESSATION INTERMEDIATE 3-10 MINUTES: ICD-10-PCS | Mod: S$GLB,,, | Performed by: NURSE PRACTITIONER

## 2023-03-16 PROCEDURE — 3079F PR MOST RECENT DIASTOLIC BLOOD PRESSURE 80-89 MM HG: ICD-10-PCS | Mod: CPTII,S$GLB,, | Performed by: NURSE PRACTITIONER

## 2023-03-16 PROCEDURE — 99215 PR OFFICE/OUTPT VISIT, EST, LEVL V, 40-54 MIN: ICD-10-PCS | Mod: 25,S$GLB,, | Performed by: NURSE PRACTITIONER

## 2023-03-16 PROCEDURE — 3074F SYST BP LT 130 MM HG: CPT | Mod: CPTII,S$GLB,, | Performed by: NURSE PRACTITIONER

## 2023-03-16 PROCEDURE — 3008F PR BODY MASS INDEX (BMI) DOCUMENTED: ICD-10-PCS | Mod: CPTII,S$GLB,, | Performed by: NURSE PRACTITIONER

## 2023-03-16 PROCEDURE — 3079F DIAST BP 80-89 MM HG: CPT | Mod: CPTII,S$GLB,, | Performed by: NURSE PRACTITIONER

## 2023-03-16 PROCEDURE — 1159F PR MEDICATION LIST DOCUMENTED IN MEDICAL RECORD: ICD-10-PCS | Mod: CPTII,S$GLB,, | Performed by: NURSE PRACTITIONER

## 2023-03-16 PROCEDURE — 99406 BEHAV CHNG SMOKING 3-10 MIN: CPT | Mod: S$GLB,,, | Performed by: NURSE PRACTITIONER

## 2023-03-16 PROCEDURE — 3008F BODY MASS INDEX DOCD: CPT | Mod: CPTII,S$GLB,, | Performed by: NURSE PRACTITIONER

## 2023-03-16 PROCEDURE — 1160F RVW MEDS BY RX/DR IN RCRD: CPT | Mod: CPTII,S$GLB,, | Performed by: NURSE PRACTITIONER

## 2023-03-16 RX ORDER — SODIUM CHLORIDE 0.9 % (FLUSH) 0.9 %
10 SYRINGE (ML) INJECTION
Status: CANCELLED | OUTPATIENT
Start: 2023-03-16

## 2023-03-16 RX ORDER — LEVOTHYROXINE SODIUM 200 UG/1
200 TABLET ORAL DAILY
Qty: 30 TABLET | Refills: 11 | Status: SHIPPED | OUTPATIENT
Start: 2023-03-16 | End: 2023-07-06

## 2023-03-16 RX ORDER — OXYCODONE AND ACETAMINOPHEN 7.5; 325 MG/1; MG/1
1 TABLET ORAL EVERY 8 HOURS PRN
Qty: 90 TABLET | Refills: 0 | Status: SHIPPED | OUTPATIENT
Start: 2023-03-16 | End: 2023-04-21 | Stop reason: SDUPTHER

## 2023-03-16 RX ORDER — HEPARIN 100 UNIT/ML
500 SYRINGE INTRAVENOUS
Status: CANCELLED | OUTPATIENT
Start: 2023-03-16
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
## 2023-04-21 DIAGNOSIS — C34.92 MALIGNANT NEOPLASM OF LEFT LUNG STAGE 4: ICD-10-CM

## 2023-04-21 DIAGNOSIS — C79.51 METASTATIC CANCER TO BONE: ICD-10-CM

## 2023-04-21 DIAGNOSIS — G89.3 CANCER RELATED PAIN: ICD-10-CM

## 2023-04-24 RX ORDER — OXYCODONE AND ACETAMINOPHEN 7.5; 325 MG/1; MG/1
1 TABLET ORAL EVERY 8 HOURS PRN
Qty: 90 TABLET | Refills: 0 | Status: SHIPPED | OUTPATIENT
Start: 2023-04-24 | End: 2023-05-25 | Stop reason: SDUPTHER

## 2023-05-04 DIAGNOSIS — E03.9 HYPOTHYROIDISM, UNSPECIFIED TYPE: ICD-10-CM

## 2023-05-04 DIAGNOSIS — C34.92 MALIGNANT NEOPLASM OF LEFT LUNG STAGE 4: Primary | ICD-10-CM

## 2023-05-05 ENCOUNTER — TELEPHONE (OUTPATIENT)
Dept: HEMATOLOGY/ONCOLOGY | Facility: CLINIC | Age: 58
End: 2023-05-05
Payer: MEDICAID

## 2023-05-05 NOTE — TELEPHONE ENCOUNTER
Patient calling stating that he is in unbearable pain. Patient was slurring his words on the phone and speaking erratically. Spoke to patient's daughter who denied any change in mental status, states that he sounds like that because the pain is so bad and he doesn't have any pain medication.      was checked as well as [facility], script for percocet 90 tabs x 30 day supply was picked up on 4/24/23, therefore patient not due for a refill. I let the patient and his daughter know this. Suggested they double check that the medication wasn't misplaced at home. Patient instructed to go to the ER if pain persists. Voiced understanding. Back on infusion schedule for 5/11.

## 2023-05-25 ENCOUNTER — OFFICE VISIT (OUTPATIENT)
Dept: HEMATOLOGY/ONCOLOGY | Facility: CLINIC | Age: 58
End: 2023-05-25
Payer: MEDICAID

## 2023-05-25 VITALS
DIASTOLIC BLOOD PRESSURE: 82 MMHG | RESPIRATION RATE: 15 BRPM | SYSTOLIC BLOOD PRESSURE: 128 MMHG | TEMPERATURE: 98 F | BODY MASS INDEX: 22.81 KG/M2 | HEIGHT: 64 IN | OXYGEN SATURATION: 96 % | WEIGHT: 133.63 LBS | HEART RATE: 74 BPM

## 2023-05-25 DIAGNOSIS — C34.92 MALIGNANT NEOPLASM OF LEFT LUNG STAGE 4: Primary | ICD-10-CM

## 2023-05-25 DIAGNOSIS — C79.51 METASTATIC CANCER TO BONE: ICD-10-CM

## 2023-05-25 DIAGNOSIS — G89.3 CANCER RELATED PAIN: ICD-10-CM

## 2023-05-25 LAB
ALBUMIN SERPL BCP-MCNC: 3.6 G/DL (ref 3.4–5)
ALBUMIN/GLOBULIN RATIO: 0.69 RATIO (ref 1.1–1.8)
ALP SERPL-CCNC: 91 U/L (ref 46–116)
ALT SERPL W P-5'-P-CCNC: 15 U/L (ref 12–78)
ANION GAP SERPL CALC-SCNC: 7 MMOL/L (ref 3–11)
AST SERPL-CCNC: 20 U/L (ref 15–37)
BASOPHILS NFR BLD: 0.8 % (ref 0–3)
BILIRUB SERPL-MCNC: 0.4 MG/DL (ref 0–1)
BUN SERPL-MCNC: 7 MG/DL (ref 7–18)
BUN/CREAT SERPL: 8.33 RATIO (ref 7–18)
CALCIUM SERPL-MCNC: 9.9 MG/DL (ref 8.8–10.5)
CHLORIDE SERPL-SCNC: 102 MMOL/L (ref 100–108)
CO2 SERPL-SCNC: 29 MMOL/L (ref 21–32)
CREAT SERPL-MCNC: 0.84 MG/DL (ref 0.7–1.3)
EOSINOPHIL NFR BLD: 1.7 % (ref 1–3)
ERYTHROCYTE [DISTWIDTH] IN BLOOD BY AUTOMATED COUNT: 16 % (ref 12.5–18)
GFR ESTIMATION: > 60
GLOBULIN: 5.2 G/DL (ref 2.3–3.5)
GLUCOSE SERPL-MCNC: 114 MG/DL (ref 70–110)
HCT VFR BLD AUTO: 40.8 % (ref 42–52)
HGB BLD-MCNC: 13.7 G/DL (ref 14–18)
LYMPHOCYTES NFR BLD: 22.3 % (ref 25–40)
MCH RBC QN AUTO: 31.9 PG (ref 27–31.2)
MCHC RBC AUTO-ENTMCNC: 33.6 G/DL (ref 31.8–35.4)
MCV RBC AUTO: 94.9 FL (ref 80–97)
MONOCYTES NFR BLD: 8.9 % (ref 1–15)
NEUTROPHILS # BLD AUTO: 4.16 10*3/UL (ref 1.8–7.7)
NEUTROPHILS NFR BLD: 65.8 % (ref 37–80)
NUCLEATED RED BLOOD CELLS: 0 %
PLATELETS: 146 10*3/UL (ref 142–424)
POTASSIUM SERPL-SCNC: 4.3 MMOL/L (ref 3.6–5.2)
PROT SERPL-MCNC: 8.8 G/DL (ref 6.4–8.2)
RBC # BLD AUTO: 4.3 10*6/UL (ref 4.7–6.1)
SODIUM BLD-SCNC: 138 MMOL/L (ref 135–145)
TSH SERPL DL<=0.005 MIU/L-ACNC: 0.4 UIU/ML (ref 0.36–3.74)
WBC # BLD: 6.3 10*3/UL (ref 4.6–10.2)

## 2023-05-25 PROCEDURE — 1159F MED LIST DOCD IN RCRD: CPT | Mod: CPTII,S$GLB,, | Performed by: NURSE PRACTITIONER

## 2023-05-25 PROCEDURE — 3008F BODY MASS INDEX DOCD: CPT | Mod: CPTII,S$GLB,, | Performed by: NURSE PRACTITIONER

## 2023-05-25 PROCEDURE — 3008F PR BODY MASS INDEX (BMI) DOCUMENTED: ICD-10-PCS | Mod: CPTII,S$GLB,, | Performed by: NURSE PRACTITIONER

## 2023-05-25 PROCEDURE — 1159F PR MEDICATION LIST DOCUMENTED IN MEDICAL RECORD: ICD-10-PCS | Mod: CPTII,S$GLB,, | Performed by: NURSE PRACTITIONER

## 2023-05-25 PROCEDURE — 3079F DIAST BP 80-89 MM HG: CPT | Mod: CPTII,S$GLB,, | Performed by: NURSE PRACTITIONER

## 2023-05-25 PROCEDURE — 1160F PR REVIEW ALL MEDS BY PRESCRIBER/CLIN PHARMACIST DOCUMENTED: ICD-10-PCS | Mod: CPTII,S$GLB,, | Performed by: NURSE PRACTITIONER

## 2023-05-25 PROCEDURE — 99215 OFFICE O/P EST HI 40 MIN: CPT | Mod: S$GLB,,, | Performed by: NURSE PRACTITIONER

## 2023-05-25 PROCEDURE — 3074F PR MOST RECENT SYSTOLIC BLOOD PRESSURE < 130 MM HG: ICD-10-PCS | Mod: CPTII,S$GLB,, | Performed by: NURSE PRACTITIONER

## 2023-05-25 PROCEDURE — 3079F PR MOST RECENT DIASTOLIC BLOOD PRESSURE 80-89 MM HG: ICD-10-PCS | Mod: CPTII,S$GLB,, | Performed by: NURSE PRACTITIONER

## 2023-05-25 PROCEDURE — 1160F RVW MEDS BY RX/DR IN RCRD: CPT | Mod: CPTII,S$GLB,, | Performed by: NURSE PRACTITIONER

## 2023-05-25 PROCEDURE — 3074F SYST BP LT 130 MM HG: CPT | Mod: CPTII,S$GLB,, | Performed by: NURSE PRACTITIONER

## 2023-05-25 PROCEDURE — 99215 PR OFFICE/OUTPT VISIT, EST, LEVL V, 40-54 MIN: ICD-10-PCS | Mod: S$GLB,,, | Performed by: NURSE PRACTITIONER

## 2023-05-25 RX ORDER — HEPARIN 100 UNIT/ML
500 SYRINGE INTRAVENOUS
Status: CANCELLED | OUTPATIENT
Start: 2023-05-25

## 2023-05-25 RX ORDER — OXYCODONE AND ACETAMINOPHEN 7.5; 325 MG/1; MG/1
1 TABLET ORAL EVERY 8 HOURS PRN
Qty: 90 TABLET | Refills: 0 | Status: SHIPPED | OUTPATIENT
Start: 2023-05-25 | End: 2023-07-07 | Stop reason: SDUPTHER

## 2023-05-25 RX ORDER — SODIUM CHLORIDE 0.9 % (FLUSH) 0.9 %
10 SYRINGE (ML) INJECTION
Status: CANCELLED | OUTPATIENT
Start: 2023-05-25
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
## 2023-06-09 ENCOUNTER — TELEPHONE (OUTPATIENT)
Dept: HEMATOLOGY/ONCOLOGY | Facility: CLINIC | Age: 58
End: 2023-06-09
Payer: MEDICAID

## 2023-06-19 ENCOUNTER — TELEPHONE (OUTPATIENT)
Dept: HEMATOLOGY/ONCOLOGY | Facility: CLINIC | Age: 58
End: 2023-06-19
Payer: MEDICAID

## 2023-06-19 DIAGNOSIS — C34.92 MALIGNANT NEOPLASM OF LEFT LUNG STAGE 4: Primary | ICD-10-CM

## 2023-07-05 ENCOUNTER — TELEPHONE (OUTPATIENT)
Dept: HEMATOLOGY/ONCOLOGY | Facility: CLINIC | Age: 58
End: 2023-07-05
Payer: MEDICAID

## 2023-07-06 ENCOUNTER — OFFICE VISIT (OUTPATIENT)
Dept: HEMATOLOGY/ONCOLOGY | Facility: CLINIC | Age: 58
End: 2023-07-06
Payer: MEDICAID

## 2023-07-06 VITALS
OXYGEN SATURATION: 96 % | HEIGHT: 64 IN | RESPIRATION RATE: 16 BRPM | DIASTOLIC BLOOD PRESSURE: 80 MMHG | SYSTOLIC BLOOD PRESSURE: 128 MMHG | HEART RATE: 77 BPM | WEIGHT: 130 LBS | BODY MASS INDEX: 22.2 KG/M2

## 2023-07-06 DIAGNOSIS — C34.92 MALIGNANT NEOPLASM OF LEFT LUNG STAGE 4: Primary | ICD-10-CM

## 2023-07-06 DIAGNOSIS — E03.9 HYPOTHYROIDISM, UNSPECIFIED TYPE: ICD-10-CM

## 2023-07-06 LAB
ALBUMIN SERPL BCP-MCNC: 3.7 G/DL (ref 3.4–5)
ALBUMIN/GLOBULIN RATIO: 0.77 RATIO (ref 1.1–1.8)
ALP SERPL-CCNC: 66 U/L (ref 46–116)
ALT SERPL W P-5'-P-CCNC: 15 U/L (ref 12–78)
ANION GAP SERPL CALC-SCNC: 6 MMOL/L (ref 3–11)
AST SERPL-CCNC: 23 U/L (ref 15–37)
BASOPHILS NFR BLD: 0.4 % (ref 0–3)
BILIRUB SERPL-MCNC: 0.4 MG/DL (ref 0–1)
BUN SERPL-MCNC: 10 MG/DL (ref 7–18)
BUN/CREAT SERPL: 12.82 RATIO (ref 7–18)
CALCIUM SERPL-MCNC: 9.7 MG/DL (ref 8.8–10.5)
CHLORIDE SERPL-SCNC: 102 MMOL/L (ref 100–108)
CO2 SERPL-SCNC: 30 MMOL/L (ref 21–32)
CREAT SERPL-MCNC: 0.78 MG/DL (ref 0.7–1.3)
EOSINOPHIL NFR BLD: 2.5 % (ref 1–3)
ERYTHROCYTE [DISTWIDTH] IN BLOOD BY AUTOMATED COUNT: 14.8 % (ref 12.5–18)
GFR ESTIMATION: > 60
GLOBULIN: 4.8 G/DL (ref 2.3–3.5)
GLUCOSE SERPL-MCNC: 79 MG/DL (ref 70–110)
HCT VFR BLD AUTO: 39.5 % (ref 42–52)
HGB BLD-MCNC: 13.4 G/DL (ref 14–18)
LYMPHOCYTES NFR BLD: 22.2 % (ref 25–40)
MCH RBC QN AUTO: 32.2 PG (ref 27–31.2)
MCHC RBC AUTO-ENTMCNC: 33.9 G/DL (ref 31.8–35.4)
MCV RBC AUTO: 95 FL (ref 80–97)
MONOCYTES NFR BLD: 8.1 % (ref 1–15)
NEUTROPHILS # BLD AUTO: 4.51 10*3/UL (ref 1.8–7.7)
NEUTROPHILS NFR BLD: 66.2 % (ref 37–80)
NUCLEATED RED BLOOD CELLS: 0 %
PLATELETS: 148 10*3/UL (ref 142–424)
POTASSIUM SERPL-SCNC: 3.5 MMOL/L (ref 3.6–5.2)
PROT SERPL-MCNC: 8.5 G/DL (ref 6.4–8.2)
RBC # BLD AUTO: 4.16 10*6/UL (ref 4.7–6.1)
SODIUM BLD-SCNC: 138 MMOL/L (ref 135–145)
TSH SERPL DL<=0.005 MIU/L-ACNC: 0.02 UIU/ML (ref 0.36–3.74)
WBC # BLD: 6.8 10*3/UL (ref 4.6–10.2)

## 2023-07-06 PROCEDURE — 99215 OFFICE O/P EST HI 40 MIN: CPT | Mod: S$GLB,,, | Performed by: NURSE PRACTITIONER

## 2023-07-06 PROCEDURE — 3074F SYST BP LT 130 MM HG: CPT | Mod: CPTII,S$GLB,, | Performed by: NURSE PRACTITIONER

## 2023-07-06 PROCEDURE — 1160F RVW MEDS BY RX/DR IN RCRD: CPT | Mod: CPTII,S$GLB,, | Performed by: NURSE PRACTITIONER

## 2023-07-06 PROCEDURE — 3008F BODY MASS INDEX DOCD: CPT | Mod: CPTII,S$GLB,, | Performed by: NURSE PRACTITIONER

## 2023-07-06 PROCEDURE — 99215 PR OFFICE/OUTPT VISIT, EST, LEVL V, 40-54 MIN: ICD-10-PCS | Mod: S$GLB,,, | Performed by: NURSE PRACTITIONER

## 2023-07-06 PROCEDURE — 1160F PR REVIEW ALL MEDS BY PRESCRIBER/CLIN PHARMACIST DOCUMENTED: ICD-10-PCS | Mod: CPTII,S$GLB,, | Performed by: NURSE PRACTITIONER

## 2023-07-06 PROCEDURE — 3074F PR MOST RECENT SYSTOLIC BLOOD PRESSURE < 130 MM HG: ICD-10-PCS | Mod: CPTII,S$GLB,, | Performed by: NURSE PRACTITIONER

## 2023-07-06 PROCEDURE — 3008F PR BODY MASS INDEX (BMI) DOCUMENTED: ICD-10-PCS | Mod: CPTII,S$GLB,, | Performed by: NURSE PRACTITIONER

## 2023-07-06 PROCEDURE — 3079F DIAST BP 80-89 MM HG: CPT | Mod: CPTII,S$GLB,, | Performed by: NURSE PRACTITIONER

## 2023-07-06 PROCEDURE — 1159F PR MEDICATION LIST DOCUMENTED IN MEDICAL RECORD: ICD-10-PCS | Mod: CPTII,S$GLB,, | Performed by: NURSE PRACTITIONER

## 2023-07-06 PROCEDURE — 1159F MED LIST DOCD IN RCRD: CPT | Mod: CPTII,S$GLB,, | Performed by: NURSE PRACTITIONER

## 2023-07-06 PROCEDURE — 3079F PR MOST RECENT DIASTOLIC BLOOD PRESSURE 80-89 MM HG: ICD-10-PCS | Mod: CPTII,S$GLB,, | Performed by: NURSE PRACTITIONER

## 2023-07-06 RX ORDER — LEVOTHYROXINE SODIUM 100 UG/1
100 TABLET ORAL DAILY
Qty: 30 TABLET | Refills: 11 | Status: SHIPPED | OUTPATIENT
Start: 2023-07-06 | End: 2023-11-13 | Stop reason: SDUPTHER

## 2023-07-06 RX ORDER — SODIUM CHLORIDE 0.9 % (FLUSH) 0.9 %
10 SYRINGE (ML) INJECTION
Status: CANCELLED | OUTPATIENT
Start: 2023-07-06

## 2023-07-06 RX ORDER — HEPARIN 100 UNIT/ML
500 SYRINGE INTRAVENOUS
Status: CANCELLED | OUTPATIENT
Start: 2023-07-06
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
## 2023-07-07 ENCOUNTER — TELEPHONE (OUTPATIENT)
Dept: HEMATOLOGY/ONCOLOGY | Facility: CLINIC | Age: 58
End: 2023-07-07
Payer: MEDICAID

## 2023-07-07 DIAGNOSIS — C34.92 MALIGNANT NEOPLASM OF LEFT LUNG STAGE 4: ICD-10-CM

## 2023-07-07 DIAGNOSIS — C79.51 METASTATIC CANCER TO BONE: ICD-10-CM

## 2023-07-07 DIAGNOSIS — G89.3 CANCER RELATED PAIN: ICD-10-CM

## 2023-07-07 RX ORDER — OXYCODONE AND ACETAMINOPHEN 7.5; 325 MG/1; MG/1
1 TABLET ORAL EVERY 8 HOURS PRN
Qty: 90 TABLET | Refills: 0 | Status: SHIPPED | OUTPATIENT
Start: 2023-07-07 | End: 2023-08-08 | Stop reason: SDUPTHER

## 2023-07-07 NOTE — TELEPHONE ENCOUNTER
----- Message from Karen Cheney sent at 7/6/2023  2:53 PM CDT -----  Contact: self  Requesting a call back regarding his medication - just getting out of chemo. Please call back at [phone]

## 2023-07-14 LAB
DNA RANGE(S) EXAMINED NAR: NORMAL
GENE DIS ANL INTERP-IMP: POSITIVE
GENE DIS ASSESSED: NORMAL
MSI CA SPEC-IMP: NOT DETECTED
REASON FOR STUDY: NORMAL
TEMPUS LCA: NORMAL
TEMPUS PORTAL: NORMAL

## 2023-07-28 ENCOUNTER — TELEPHONE (OUTPATIENT)
Dept: HEMATOLOGY/ONCOLOGY | Facility: CLINIC | Age: 58
End: 2023-07-28
Payer: MEDICAID

## 2023-07-28 NOTE — TELEPHONE ENCOUNTER
----- Message from Yaneli Lemons sent at 7/27/2023  2:26 PM CDT -----  Patient is requesting a call back at [phone]...

## 2023-07-28 NOTE — TELEPHONE ENCOUNTER
Verified to pt has has a lab appt for 08/16/2023 and md appt on 08/17/2023. He stated Faith told him she was going to change his tx to 3 weeks and we had him on the schedule for July 27 but I confirmed with Faith and she stated to keep him on for August 17 for a 6 week f/u. Pt also asked for a refill on oxycodone and I stated it was sent on 07/07/2023 to [facility] in [facility]. He voiced understanding.

## 2023-08-08 DIAGNOSIS — G89.3 CANCER RELATED PAIN: ICD-10-CM

## 2023-08-08 DIAGNOSIS — C79.51 METASTATIC CANCER TO BONE: ICD-10-CM

## 2023-08-08 DIAGNOSIS — C34.92 MALIGNANT NEOPLASM OF LEFT LUNG STAGE 4: ICD-10-CM

## 2023-08-08 RX ORDER — OXYCODONE AND ACETAMINOPHEN 7.5; 325 MG/1; MG/1
1 TABLET ORAL EVERY 8 HOURS PRN
Qty: 90 TABLET | Refills: 0 | Status: SHIPPED | OUTPATIENT
Start: 2023-08-08 | End: 2023-09-06 | Stop reason: SDUPTHER

## 2023-08-08 NOTE — TELEPHONE ENCOUNTER
----- Message from Katty Solorio sent at 8/8/2023  1:03 PM CDT -----  Contact: Pt  Pt is calling rg having questions rg his medication and can be reached at [phone]//thanks/dbw

## 2023-08-17 ENCOUNTER — OFFICE VISIT (OUTPATIENT)
Dept: HEMATOLOGY/ONCOLOGY | Facility: CLINIC | Age: 58
End: 2023-08-17
Payer: MEDICAID

## 2023-08-17 VITALS
BODY MASS INDEX: 21.99 KG/M2 | RESPIRATION RATE: 18 BRPM | DIASTOLIC BLOOD PRESSURE: 80 MMHG | OXYGEN SATURATION: 96 % | WEIGHT: 128.81 LBS | HEART RATE: 64 BPM | SYSTOLIC BLOOD PRESSURE: 137 MMHG | HEIGHT: 64 IN

## 2023-08-17 DIAGNOSIS — C34.92 MALIGNANT NEOPLASM OF LEFT LUNG STAGE 4: ICD-10-CM

## 2023-08-17 DIAGNOSIS — C79.51 METASTATIC CANCER TO BONE: ICD-10-CM

## 2023-08-17 DIAGNOSIS — C79.72 MALIGNANT NEOPLASM METASTATIC TO LEFT ADRENAL GLAND: Primary | ICD-10-CM

## 2023-08-17 LAB
ALBUMIN SERPL BCP-MCNC: 3.4 G/DL (ref 3.4–5)
ALBUMIN/GLOBULIN RATIO: 0.65 RATIO (ref 1.1–1.8)
ALP SERPL-CCNC: 79 U/L (ref 46–116)
ALT SERPL W P-5'-P-CCNC: 10 U/L (ref 12–78)
ANION GAP SERPL CALC-SCNC: 9 MMOL/L (ref 3–11)
AST SERPL-CCNC: 20 U/L (ref 15–37)
BASOPHILS NFR BLD: 0.2 % (ref 0–3)
BILIRUB SERPL-MCNC: 0.3 MG/DL (ref 0–1)
BUN SERPL-MCNC: 9 MG/DL (ref 7–18)
BUN/CREAT SERPL: 10.34 RATIO (ref 7–18)
CALCIUM SERPL-MCNC: 9.6 MG/DL (ref 8.8–10.5)
CHLORIDE SERPL-SCNC: 97 MMOL/L (ref 100–108)
CO2 SERPL-SCNC: 28 MMOL/L (ref 21–32)
CREAT SERPL-MCNC: 0.87 MG/DL (ref 0.7–1.3)
EOSINOPHIL NFR BLD: 2.2 % (ref 1–3)
ERYTHROCYTE [DISTWIDTH] IN BLOOD BY AUTOMATED COUNT: 15.1 % (ref 12.5–18)
GFR ESTIMATION: > 60
GLOBULIN: 5.2 G/DL (ref 2.3–3.5)
GLUCOSE SERPL-MCNC: 106 MG/DL (ref 70–110)
HCT VFR BLD AUTO: 35.7 % (ref 42–52)
HGB BLD-MCNC: 11.9 G/DL (ref 14–18)
LYMPHOCYTES NFR BLD: 25.2 % (ref 25–40)
MCH RBC QN AUTO: 31.7 PG (ref 27–31.2)
MCHC RBC AUTO-ENTMCNC: 33.3 G/DL (ref 31.8–35.4)
MCV RBC AUTO: 95.2 FL (ref 80–97)
MONOCYTES NFR BLD: 9.9 % (ref 1–15)
NEUTROPHILS # BLD AUTO: 3.99 10*3/UL (ref 1.8–7.7)
NEUTROPHILS NFR BLD: 61.6 % (ref 37–80)
NUCLEATED RED BLOOD CELLS: 0 %
PLATELETS: 144 10*3/UL (ref 142–424)
POTASSIUM SERPL-SCNC: 3.2 MMOL/L (ref 3.6–5.2)
PROT SERPL-MCNC: 8.6 G/DL (ref 6.4–8.2)
RBC # BLD AUTO: 3.75 10*6/UL (ref 4.7–6.1)
SODIUM BLD-SCNC: 134 MMOL/L (ref 135–145)
TSH SERPL DL<=0.005 MIU/L-ACNC: 33.65 UIU/ML (ref 0.36–3.74)
WBC # BLD: 6.5 10*3/UL (ref 4.6–10.2)

## 2023-08-17 PROCEDURE — 99214 PR OFFICE/OUTPT VISIT, EST, LEVL IV, 30-39 MIN: ICD-10-PCS | Mod: S$GLB,,, | Performed by: NURSE PRACTITIONER

## 2023-08-17 PROCEDURE — 1160F PR REVIEW ALL MEDS BY PRESCRIBER/CLIN PHARMACIST DOCUMENTED: ICD-10-PCS | Mod: CPTII,S$GLB,, | Performed by: NURSE PRACTITIONER

## 2023-08-17 PROCEDURE — 3079F PR MOST RECENT DIASTOLIC BLOOD PRESSURE 80-89 MM HG: ICD-10-PCS | Mod: CPTII,S$GLB,, | Performed by: NURSE PRACTITIONER

## 2023-08-17 PROCEDURE — 3008F PR BODY MASS INDEX (BMI) DOCUMENTED: ICD-10-PCS | Mod: CPTII,S$GLB,, | Performed by: NURSE PRACTITIONER

## 2023-08-17 PROCEDURE — 99214 OFFICE O/P EST MOD 30 MIN: CPT | Mod: S$GLB,,, | Performed by: NURSE PRACTITIONER

## 2023-08-17 PROCEDURE — 1159F PR MEDICATION LIST DOCUMENTED IN MEDICAL RECORD: ICD-10-PCS | Mod: CPTII,S$GLB,, | Performed by: NURSE PRACTITIONER

## 2023-08-17 PROCEDURE — 3008F BODY MASS INDEX DOCD: CPT | Mod: CPTII,S$GLB,, | Performed by: NURSE PRACTITIONER

## 2023-08-17 PROCEDURE — 3075F SYST BP GE 130 - 139MM HG: CPT | Mod: CPTII,S$GLB,, | Performed by: NURSE PRACTITIONER

## 2023-08-17 PROCEDURE — 3075F PR MOST RECENT SYSTOLIC BLOOD PRESS GE 130-139MM HG: ICD-10-PCS | Mod: CPTII,S$GLB,, | Performed by: NURSE PRACTITIONER

## 2023-08-17 PROCEDURE — 3079F DIAST BP 80-89 MM HG: CPT | Mod: CPTII,S$GLB,, | Performed by: NURSE PRACTITIONER

## 2023-08-17 PROCEDURE — 1160F RVW MEDS BY RX/DR IN RCRD: CPT | Mod: CPTII,S$GLB,, | Performed by: NURSE PRACTITIONER

## 2023-08-17 PROCEDURE — 1159F MED LIST DOCD IN RCRD: CPT | Mod: CPTII,S$GLB,, | Performed by: NURSE PRACTITIONER

## 2023-08-17 RX ORDER — HEPARIN 100 UNIT/ML
500 SYRINGE INTRAVENOUS
Status: CANCELLED | OUTPATIENT
Start: 2023-08-17

## 2023-08-17 RX ORDER — SODIUM CHLORIDE 0.9 % (FLUSH) 0.9 %
10 SYRINGE (ML) INJECTION
Status: CANCELLED | OUTPATIENT
Start: 2023-08-17
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
## 2023-08-31 ENCOUNTER — TELEPHONE (OUTPATIENT)
Dept: HEMATOLOGY/ONCOLOGY | Facility: CLINIC | Age: 58
End: 2023-08-31
Payer: MEDICAID

## 2023-08-31 NOTE — TELEPHONE ENCOUNTER
Returned Karen call from  and scan was done on Patient this morning. 8/31/2023 1:01 pm          ----- Message from Yaneli Lemons sent at 8/31/2023  8:11 AM CDT -----  Karen from  imaging is calling in regards to authorization for CT scan..Please call her back at [phone].              Thanks  radha

## 2023-09-06 DIAGNOSIS — G89.3 CANCER RELATED PAIN: ICD-10-CM

## 2023-09-06 DIAGNOSIS — C79.51 METASTATIC CANCER TO BONE: ICD-10-CM

## 2023-09-06 DIAGNOSIS — C34.92 MALIGNANT NEOPLASM OF LEFT LUNG STAGE 4: ICD-10-CM

## 2023-09-06 RX ORDER — OXYCODONE AND ACETAMINOPHEN 7.5; 325 MG/1; MG/1
1 TABLET ORAL EVERY 8 HOURS PRN
Qty: 90 TABLET | Refills: 0 | Status: SHIPPED | OUTPATIENT
Start: 2023-09-06 | End: 2023-10-03 | Stop reason: SDUPTHER

## 2023-09-29 LAB
ALBUMIN SERPL BCP-MCNC: 3.9 G/DL (ref 3.4–5)
ALBUMIN/GLOBULIN RATIO: 0.91 RATIO (ref 1.1–1.8)
ALP SERPL-CCNC: 75 U/L (ref 46–116)
ALT SERPL W P-5'-P-CCNC: 15 U/L (ref 12–78)
ANION GAP SERPL CALC-SCNC: 7 MMOL/L (ref 3–11)
AST SERPL-CCNC: 22 U/L (ref 15–37)
BASOPHILS NFR BLD: 0.4 % (ref 0–3)
BILIRUB SERPL-MCNC: 0.5 MG/DL (ref 0–1)
BUN SERPL-MCNC: 7 MG/DL (ref 7–18)
BUN/CREAT SERPL: 8.86 RATIO (ref 7–18)
CALCIUM SERPL-MCNC: 9.5 MG/DL (ref 8.8–10.5)
CHLORIDE SERPL-SCNC: 98 MMOL/L (ref 100–108)
CO2 SERPL-SCNC: 29 MMOL/L (ref 21–32)
CREAT SERPL-MCNC: 0.79 MG/DL (ref 0.7–1.3)
EOSINOPHIL NFR BLD: 2 % (ref 1–3)
ERYTHROCYTE [DISTWIDTH] IN BLOOD BY AUTOMATED COUNT: 15.6 % (ref 12.5–18)
GFR ESTIMATION: > 60
GLOBULIN: 4.3 G/DL (ref 2.3–3.5)
GLUCOSE SERPL-MCNC: 135 MG/DL (ref 70–110)
HCT VFR BLD AUTO: 37.3 % (ref 42–52)
HGB BLD-MCNC: 12.8 G/DL (ref 14–18)
LYMPHOCYTES NFR BLD: 26.3 % (ref 25–40)
MCH RBC QN AUTO: 33.2 PG (ref 27–31.2)
MCHC RBC AUTO-ENTMCNC: 34.3 G/DL (ref 31.8–35.4)
MCV RBC AUTO: 96.9 FL (ref 80–97)
MONOCYTES NFR BLD: 7.5 % (ref 1–15)
NEUTROPHILS # BLD AUTO: 3.45 10*3/UL (ref 1.8–7.7)
NEUTROPHILS NFR BLD: 63.4 % (ref 37–80)
NUCLEATED RED BLOOD CELLS: 0 %
PLATELETS: 141 10*3/UL (ref 142–424)
POTASSIUM SERPL-SCNC: 3.5 MMOL/L (ref 3.6–5.2)
PROT SERPL-MCNC: 8.2 G/DL (ref 6.4–8.2)
RBC # BLD AUTO: 3.85 10*6/UL (ref 4.7–6.1)
SODIUM BLD-SCNC: 134 MMOL/L (ref 135–145)
TSH SERPL DL<=0.005 MIU/L-ACNC: 38.58 UIU/ML (ref 0.36–3.74)
WBC # BLD: 5.4 10*3/UL (ref 4.6–10.2)

## 2023-10-02 ENCOUNTER — OFFICE VISIT (OUTPATIENT)
Dept: HEMATOLOGY/ONCOLOGY | Facility: CLINIC | Age: 58
End: 2023-10-02
Payer: MEDICAID

## 2023-10-02 ENCOUNTER — TELEPHONE (OUTPATIENT)
Dept: HEMATOLOGY/ONCOLOGY | Facility: CLINIC | Age: 58
End: 2023-10-02

## 2023-10-02 VITALS
SYSTOLIC BLOOD PRESSURE: 129 MMHG | HEIGHT: 66 IN | WEIGHT: 126.38 LBS | RESPIRATION RATE: 18 BRPM | OXYGEN SATURATION: 96 % | HEART RATE: 77 BPM | DIASTOLIC BLOOD PRESSURE: 78 MMHG | BODY MASS INDEX: 20.31 KG/M2

## 2023-10-02 DIAGNOSIS — E03.9 HYPOTHYROIDISM, UNSPECIFIED TYPE: ICD-10-CM

## 2023-10-02 DIAGNOSIS — C34.92 MALIGNANT NEOPLASM OF LEFT LUNG STAGE 4: Primary | ICD-10-CM

## 2023-10-02 DIAGNOSIS — C79.71 MALIGNANT NEOPLASM METASTATIC TO RIGHT ADRENAL GLAND: ICD-10-CM

## 2023-10-02 PROCEDURE — 99215 PR OFFICE/OUTPT VISIT, EST, LEVL V, 40-54 MIN: ICD-10-PCS | Mod: S$GLB,,, | Performed by: NURSE PRACTITIONER

## 2023-10-02 PROCEDURE — 1160F RVW MEDS BY RX/DR IN RCRD: CPT | Mod: CPTII,S$GLB,, | Performed by: NURSE PRACTITIONER

## 2023-10-02 PROCEDURE — 99215 OFFICE O/P EST HI 40 MIN: CPT | Mod: S$GLB,,, | Performed by: NURSE PRACTITIONER

## 2023-10-02 PROCEDURE — 1159F MED LIST DOCD IN RCRD: CPT | Mod: CPTII,S$GLB,, | Performed by: NURSE PRACTITIONER

## 2023-10-02 PROCEDURE — 3078F DIAST BP <80 MM HG: CPT | Mod: CPTII,S$GLB,, | Performed by: NURSE PRACTITIONER

## 2023-10-02 PROCEDURE — 3008F PR BODY MASS INDEX (BMI) DOCUMENTED: ICD-10-PCS | Mod: CPTII,S$GLB,, | Performed by: NURSE PRACTITIONER

## 2023-10-02 PROCEDURE — 1160F PR REVIEW ALL MEDS BY PRESCRIBER/CLIN PHARMACIST DOCUMENTED: ICD-10-PCS | Mod: CPTII,S$GLB,, | Performed by: NURSE PRACTITIONER

## 2023-10-02 PROCEDURE — 3074F SYST BP LT 130 MM HG: CPT | Mod: CPTII,S$GLB,, | Performed by: NURSE PRACTITIONER

## 2023-10-02 PROCEDURE — 1159F PR MEDICATION LIST DOCUMENTED IN MEDICAL RECORD: ICD-10-PCS | Mod: CPTII,S$GLB,, | Performed by: NURSE PRACTITIONER

## 2023-10-02 PROCEDURE — 3078F PR MOST RECENT DIASTOLIC BLOOD PRESSURE < 80 MM HG: ICD-10-PCS | Mod: CPTII,S$GLB,, | Performed by: NURSE PRACTITIONER

## 2023-10-02 PROCEDURE — 3008F BODY MASS INDEX DOCD: CPT | Mod: CPTII,S$GLB,, | Performed by: NURSE PRACTITIONER

## 2023-10-02 PROCEDURE — 3074F PR MOST RECENT SYSTOLIC BLOOD PRESSURE < 130 MM HG: ICD-10-PCS | Mod: CPTII,S$GLB,, | Performed by: NURSE PRACTITIONER

## 2023-10-02 RX ORDER — SODIUM CHLORIDE 0.9 % (FLUSH) 0.9 %
10 SYRINGE (ML) INJECTION
Status: CANCELLED | OUTPATIENT
Start: 2023-10-02

## 2023-10-02 RX ORDER — HEPARIN 100 UNIT/ML
500 SYRINGE INTRAVENOUS
Status: CANCELLED | OUTPATIENT
Start: 2023-10-02
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
## 2023-10-03 DIAGNOSIS — G89.3 CANCER RELATED PAIN: ICD-10-CM

## 2023-10-03 DIAGNOSIS — C79.51 METASTATIC CANCER TO BONE: ICD-10-CM

## 2023-10-03 DIAGNOSIS — C34.92 MALIGNANT NEOPLASM OF LEFT LUNG STAGE 4: ICD-10-CM

## 2023-10-03 RX ORDER — OXYCODONE AND ACETAMINOPHEN 7.5; 325 MG/1; MG/1
1 TABLET ORAL EVERY 8 HOURS PRN
Qty: 90 TABLET | Refills: 0 | Status: SHIPPED | OUTPATIENT
Start: 2023-10-03 | End: 2023-11-13 | Stop reason: SDUPTHER

## 2023-10-03 RX ORDER — OXYCODONE AND ACETAMINOPHEN 7.5; 325 MG/1; MG/1
1 TABLET ORAL EVERY 8 HOURS PRN
Qty: 90 TABLET | Refills: 0 | OUTPATIENT
Start: 2023-10-03 | End: 2024-10-02

## 2023-10-09 NOTE — TELEPHONE ENCOUNTER
----- Message from Lesly Madden sent at 1/31/2023  1:58 PM CST -----  Contact: pt  Pt calling about appt and he can be reached at [phone]     Thanks,       Patient was called earlier today & triaged.    Please see nurse triage encounter dated 10/9/23.

## 2023-11-10 LAB
ALBUMIN SERPL BCP-MCNC: 3.6 G/DL (ref 3.4–5)
ALBUMIN/GLOBULIN RATIO: 0.88 RATIO (ref 1.1–1.8)
ALP SERPL-CCNC: 66 U/L (ref 46–116)
ALT SERPL W P-5'-P-CCNC: 16 U/L (ref 12–78)
ANION GAP SERPL CALC-SCNC: 9 MMOL/L (ref 3–11)
AST SERPL-CCNC: 14 U/L (ref 15–37)
BASOPHILS NFR BLD: 0.3 % (ref 0–3)
BILIRUB SERPL-MCNC: 0.3 MG/DL (ref 0–1)
BUN SERPL-MCNC: 9 MG/DL (ref 7–18)
BUN/CREAT SERPL: 9.67 RATIO (ref 7–18)
CALCIUM SERPL-MCNC: 9.4 MG/DL (ref 8.8–10.5)
CHLORIDE SERPL-SCNC: 104 MMOL/L (ref 100–108)
CO2 SERPL-SCNC: 27 MMOL/L (ref 21–32)
CREAT SERPL-MCNC: 0.93 MG/DL (ref 0.7–1.3)
EOSINOPHIL NFR BLD: 1.7 % (ref 1–3)
ERYTHROCYTE [DISTWIDTH] IN BLOOD BY AUTOMATED COUNT: 13.8 % (ref 12.5–18)
GFR ESTIMATION: > 60
GLOBULIN: 4.1 G/DL (ref 2.3–3.5)
GLUCOSE SERPL-MCNC: 106 MG/DL (ref 70–110)
HCT VFR BLD AUTO: 35.8 % (ref 42–52)
HGB BLD-MCNC: 11.9 G/DL (ref 14–18)
LYMPHOCYTES NFR BLD: 20 % (ref 25–40)
MCH RBC QN AUTO: 32.3 PG (ref 27–31.2)
MCHC RBC AUTO-ENTMCNC: 33.2 G/DL (ref 31.8–35.4)
MCV RBC AUTO: 97.3 FL (ref 80–97)
MONOCYTES NFR BLD: 4.4 % (ref 1–15)
NEUTROPHILS # BLD AUTO: 6.36 10*3/UL (ref 1.8–7.7)
NEUTROPHILS NFR BLD: 73.1 % (ref 37–80)
NUCLEATED RED BLOOD CELLS: 0 %
PLATELETS: 152 10*3/UL (ref 142–424)
POTASSIUM SERPL-SCNC: 3.2 MMOL/L (ref 3.6–5.2)
PROT SERPL-MCNC: 7.7 G/DL (ref 6.4–8.2)
RBC # BLD AUTO: 3.68 10*6/UL (ref 4.7–6.1)
SODIUM BLD-SCNC: 140 MMOL/L (ref 135–145)
TSH SERPL DL<=0.005 MIU/L-ACNC: 35.78 UIU/ML (ref 0.36–3.74)
WBC # BLD: 8.7 10*3/UL (ref 4.6–10.2)

## 2023-11-13 ENCOUNTER — OFFICE VISIT (OUTPATIENT)
Dept: HEMATOLOGY/ONCOLOGY | Facility: CLINIC | Age: 58
End: 2023-11-13
Payer: MEDICAID

## 2023-11-13 VITALS
WEIGHT: 123 LBS | HEIGHT: 66 IN | DIASTOLIC BLOOD PRESSURE: 89 MMHG | OXYGEN SATURATION: 96 % | BODY MASS INDEX: 19.77 KG/M2 | RESPIRATION RATE: 18 BRPM | HEART RATE: 76 BPM | SYSTOLIC BLOOD PRESSURE: 136 MMHG

## 2023-11-13 DIAGNOSIS — C79.51 METASTATIC CANCER TO BONE: ICD-10-CM

## 2023-11-13 DIAGNOSIS — K55.1 CHRONIC VASCULAR DISORDERS OF INTESTINE: Primary | ICD-10-CM

## 2023-11-13 DIAGNOSIS — M87.9 OSTEONECROSIS: ICD-10-CM

## 2023-11-13 DIAGNOSIS — C79.72 MALIGNANT NEOPLASM METASTATIC TO LEFT ADRENAL GLAND: ICD-10-CM

## 2023-11-13 DIAGNOSIS — E03.9 HYPOTHYROIDISM, UNSPECIFIED TYPE: ICD-10-CM

## 2023-11-13 DIAGNOSIS — G89.3 CANCER RELATED PAIN: ICD-10-CM

## 2023-11-13 DIAGNOSIS — C34.92 MALIGNANT NEOPLASM OF LEFT LUNG STAGE 4: ICD-10-CM

## 2023-11-13 PROCEDURE — 1160F RVW MEDS BY RX/DR IN RCRD: CPT | Mod: CPTII,S$GLB,, | Performed by: NURSE PRACTITIONER

## 2023-11-13 PROCEDURE — 99215 PR OFFICE/OUTPT VISIT, EST, LEVL V, 40-54 MIN: ICD-10-PCS | Mod: S$GLB,,, | Performed by: NURSE PRACTITIONER

## 2023-11-13 PROCEDURE — 1160F PR REVIEW ALL MEDS BY PRESCRIBER/CLIN PHARMACIST DOCUMENTED: ICD-10-PCS | Mod: CPTII,S$GLB,, | Performed by: NURSE PRACTITIONER

## 2023-11-13 PROCEDURE — 1159F PR MEDICATION LIST DOCUMENTED IN MEDICAL RECORD: ICD-10-PCS | Mod: CPTII,S$GLB,, | Performed by: NURSE PRACTITIONER

## 2023-11-13 PROCEDURE — 3075F PR MOST RECENT SYSTOLIC BLOOD PRESS GE 130-139MM HG: ICD-10-PCS | Mod: CPTII,S$GLB,, | Performed by: NURSE PRACTITIONER

## 2023-11-13 PROCEDURE — 3008F BODY MASS INDEX DOCD: CPT | Mod: CPTII,S$GLB,, | Performed by: NURSE PRACTITIONER

## 2023-11-13 PROCEDURE — 3079F PR MOST RECENT DIASTOLIC BLOOD PRESSURE 80-89 MM HG: ICD-10-PCS | Mod: CPTII,S$GLB,, | Performed by: NURSE PRACTITIONER

## 2023-11-13 PROCEDURE — 3075F SYST BP GE 130 - 139MM HG: CPT | Mod: CPTII,S$GLB,, | Performed by: NURSE PRACTITIONER

## 2023-11-13 PROCEDURE — 3079F DIAST BP 80-89 MM HG: CPT | Mod: CPTII,S$GLB,, | Performed by: NURSE PRACTITIONER

## 2023-11-13 PROCEDURE — 3008F PR BODY MASS INDEX (BMI) DOCUMENTED: ICD-10-PCS | Mod: CPTII,S$GLB,, | Performed by: NURSE PRACTITIONER

## 2023-11-13 PROCEDURE — 99215 OFFICE O/P EST HI 40 MIN: CPT | Mod: S$GLB,,, | Performed by: NURSE PRACTITIONER

## 2023-11-13 PROCEDURE — 1159F MED LIST DOCD IN RCRD: CPT | Mod: CPTII,S$GLB,, | Performed by: NURSE PRACTITIONER

## 2023-11-13 RX ORDER — HEPARIN 100 UNIT/ML
500 SYRINGE INTRAVENOUS
Status: CANCELLED | OUTPATIENT
Start: 2023-12-14

## 2023-11-13 RX ORDER — SODIUM CHLORIDE 0.9 % (FLUSH) 0.9 %
10 SYRINGE (ML) INJECTION
Status: CANCELLED | OUTPATIENT
Start: 2023-12-14

## 2023-11-13 RX ORDER — LEVOTHYROXINE SODIUM 137 UG/1
137 TABLET ORAL DAILY
Qty: 30 TABLET | Refills: 11 | Status: SHIPPED | OUTPATIENT
Start: 2023-11-13 | End: 2024-11-12

## 2023-11-13 RX ORDER — OXYCODONE AND ACETAMINOPHEN 7.5; 325 MG/1; MG/1
1 TABLET ORAL EVERY 8 HOURS PRN
Qty: 90 TABLET | Refills: 0 | Status: SHIPPED | OUTPATIENT
Start: 2023-11-13 | End: 2023-12-14 | Stop reason: SDUPTHER
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
## 2023-12-04 ENCOUNTER — OFFICE VISIT (OUTPATIENT)
Dept: HEMATOLOGY/ONCOLOGY | Facility: CLINIC | Age: 58
End: 2023-12-04
Payer: MEDICAID

## 2023-12-04 VITALS
HEIGHT: 66 IN | HEART RATE: 70 BPM | SYSTOLIC BLOOD PRESSURE: 129 MMHG | WEIGHT: 120 LBS | OXYGEN SATURATION: 98 % | DIASTOLIC BLOOD PRESSURE: 83 MMHG | RESPIRATION RATE: 17 BRPM | BODY MASS INDEX: 19.29 KG/M2

## 2023-12-04 DIAGNOSIS — C79.51 METASTATIC CANCER TO BONE: ICD-10-CM

## 2023-12-04 DIAGNOSIS — C34.92 MALIGNANT NEOPLASM OF LEFT LUNG STAGE 4: ICD-10-CM

## 2023-12-04 DIAGNOSIS — N10 ACUTE PYELONEPHRITIS: Primary | ICD-10-CM

## 2023-12-04 DIAGNOSIS — C79.72 MALIGNANT NEOPLASM METASTATIC TO LEFT ADRENAL GLAND: ICD-10-CM

## 2023-12-04 DIAGNOSIS — G89.3 CANCER RELATED PAIN: ICD-10-CM

## 2023-12-04 LAB
APPEARANCE, UA: ABNORMAL
BACTERIA SPEC CULT: ABNORMAL /HPF
BILIRUB UR QL STRIP: NEGATIVE MG/DL
COLOR UR: ABNORMAL
GLUCOSE (UA): NORMAL MG/DL
HGB UR QL STRIP: 50 /UL
KETONES UR QL STRIP: ABNORMAL MG/DL
LEUKOCYTE ESTERASE UR QL STRIP: 500 /UL
NITRITE UR QL STRIP: NEGATIVE
PH UR STRIP: 6 PH (ref 5–9)
PROT UR QL STRIP: 100 MG/DL
RBC #/AREA URNS HPF: ABNORMAL /HPF (ref 0–2)
SERVICE COMMENT 03: ABNORMAL
SP GR UR STRIP: 1.02 (ref 1–1.03)
SPECIMEN COLLECTION METHOD, URINE: ABNORMAL
SQUAMOUS EPITHELIAL, UA: ABNORMAL /LPF
UROBILINOGEN UR STRIP-ACNC: NORMAL MG/DL
WBC #/AREA URNS HPF: ABNORMAL /HPF (ref 0–5)

## 2023-12-04 PROCEDURE — 1159F PR MEDICATION LIST DOCUMENTED IN MEDICAL RECORD: ICD-10-PCS | Mod: CPTII,S$GLB,, | Performed by: NURSE PRACTITIONER

## 2023-12-04 PROCEDURE — 3074F SYST BP LT 130 MM HG: CPT | Mod: CPTII,S$GLB,, | Performed by: NURSE PRACTITIONER

## 2023-12-04 PROCEDURE — 99214 OFFICE O/P EST MOD 30 MIN: CPT | Mod: S$GLB,,, | Performed by: NURSE PRACTITIONER

## 2023-12-04 PROCEDURE — 3079F PR MOST RECENT DIASTOLIC BLOOD PRESSURE 80-89 MM HG: ICD-10-PCS | Mod: CPTII,S$GLB,, | Performed by: NURSE PRACTITIONER

## 2023-12-04 PROCEDURE — 3008F BODY MASS INDEX DOCD: CPT | Mod: CPTII,S$GLB,, | Performed by: NURSE PRACTITIONER

## 2023-12-04 PROCEDURE — 1159F MED LIST DOCD IN RCRD: CPT | Mod: CPTII,S$GLB,, | Performed by: NURSE PRACTITIONER

## 2023-12-04 PROCEDURE — 3079F DIAST BP 80-89 MM HG: CPT | Mod: CPTII,S$GLB,, | Performed by: NURSE PRACTITIONER

## 2023-12-04 PROCEDURE — 3008F PR BODY MASS INDEX (BMI) DOCUMENTED: ICD-10-PCS | Mod: CPTII,S$GLB,, | Performed by: NURSE PRACTITIONER

## 2023-12-04 PROCEDURE — 3074F PR MOST RECENT SYSTOLIC BLOOD PRESSURE < 130 MM HG: ICD-10-PCS | Mod: CPTII,S$GLB,, | Performed by: NURSE PRACTITIONER

## 2023-12-04 PROCEDURE — 99214 PR OFFICE/OUTPT VISIT, EST, LEVL IV, 30-39 MIN: ICD-10-PCS | Mod: S$GLB,,, | Performed by: NURSE PRACTITIONER

## 2023-12-04 RX ORDER — CIPROFLOXACIN 500 MG/1
500 TABLET ORAL 2 TIMES DAILY
Qty: 14 TABLET | Refills: 0 | Status: SHIPPED | OUTPATIENT
Start: 2023-12-04 | End: 2023-12-11
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
## 2023-12-05 RX ORDER — OXYCODONE AND ACETAMINOPHEN 7.5; 325 MG/1; MG/1
1 TABLET ORAL EVERY 8 HOURS PRN
Qty: 90 TABLET | Refills: 0 | OUTPATIENT
Start: 2023-12-05 | End: 2024-12-04

## 2023-12-06 LAB
ORGANISM: NORMAL
URINE CULTURE, ROUTINE: NORMAL

## 2023-12-07 ENCOUNTER — OFFICE VISIT (OUTPATIENT)
Dept: CARDIOTHORACIC SURGERY | Facility: CLINIC | Age: 58
End: 2023-12-07
Payer: MEDICAID

## 2023-12-07 VITALS
HEIGHT: 66 IN | HEART RATE: 94 BPM | DIASTOLIC BLOOD PRESSURE: 83 MMHG | WEIGHT: 115.88 LBS | BODY MASS INDEX: 18.62 KG/M2 | SYSTOLIC BLOOD PRESSURE: 127 MMHG

## 2023-12-07 DIAGNOSIS — K55.1 CHRONIC VASCULAR DISORDERS OF INTESTINE: ICD-10-CM

## 2023-12-07 PROCEDURE — 1159F PR MEDICATION LIST DOCUMENTED IN MEDICAL RECORD: ICD-10-PCS | Mod: CPTII,S$GLB,, | Performed by: SURGERY

## 2023-12-07 PROCEDURE — 99204 PR OFFICE/OUTPT VISIT, NEW, LEVL IV, 45-59 MIN: ICD-10-PCS | Mod: S$GLB,,, | Performed by: SURGERY

## 2023-12-07 PROCEDURE — 1159F MED LIST DOCD IN RCRD: CPT | Mod: CPTII,S$GLB,, | Performed by: SURGERY

## 2023-12-07 PROCEDURE — 3008F BODY MASS INDEX DOCD: CPT | Mod: CPTII,S$GLB,, | Performed by: SURGERY

## 2023-12-07 PROCEDURE — 3079F PR MOST RECENT DIASTOLIC BLOOD PRESSURE 80-89 MM HG: ICD-10-PCS | Mod: CPTII,S$GLB,, | Performed by: SURGERY

## 2023-12-07 PROCEDURE — 3074F PR MOST RECENT SYSTOLIC BLOOD PRESSURE < 130 MM HG: ICD-10-PCS | Mod: CPTII,S$GLB,, | Performed by: SURGERY

## 2023-12-07 PROCEDURE — 3074F SYST BP LT 130 MM HG: CPT | Mod: CPTII,S$GLB,, | Performed by: SURGERY

## 2023-12-07 PROCEDURE — 99204 OFFICE O/P NEW MOD 45 MIN: CPT | Mod: S$GLB,,, | Performed by: SURGERY

## 2023-12-07 PROCEDURE — 3008F PR BODY MASS INDEX (BMI) DOCUMENTED: ICD-10-PCS | Mod: CPTII,S$GLB,, | Performed by: SURGERY

## 2023-12-07 PROCEDURE — 3079F DIAST BP 80-89 MM HG: CPT | Mod: CPTII,S$GLB,, | Performed by: SURGERY

## 2023-12-07 NOTE — PROGRESS NOTES
[facility] - Vascular Surgery  History and Physical      Subjective:     Chief Complaint/Reason for Clinic Visit: Evaluation of abdominal pain in the setting of superior mesenteric artery stenosis     History of Present Illness:  59 yo gentleman with past medical history significant for Stage IV lung cancer with metastases to the adrenal gland and bone presents to clinic for evaluation of abdominal pain in the setting of CT scan findings of superior mesenteric artery stenosis. Patient reports that the pain is primarily on the left side of his body. He reports that it started at the beginning of the year and has been progressing. He reports that he is on palliative chemotherapy every 6 weeks. He is seen by Faith Swain NP Oncology. He reports that he has had progressive weight loss. He was 150 lbs prior to the beginning of treatment 6 years ago when he initiated this. He is now approximately 120 lbs. He reports that he eats a bowel of cereal with milk for breakfast. He has no issues with that. He reports that he eats a little lunch. He denies any nausea or vomiting. He reports that the pain is always there. He reports that it intermittently gets worse and causes a spasm pain. He does not have classic post prandial pain.     He had a CT abdomen/pelvis with and without contrast from 08/31/2023 that showed a narrowing in his superior mesenteric artery concerning for severe superior mesenteric artery stenosis. It also showed neoplasm involving the left adrenal gland which appears slightly worse compared to prior. He also had sclerosis of L1 and L3 vertebral bodies which are suspected to reflect metastatic disease.     He was evaluated by Faith Swain NP Oncology on 12/4/2023. At that time, he had some weight loss due to poor appetite. He continued to smoke. He was referred to Vascular Surgery Clinic due to the findings of the severe SMA stenosis seen on CT scan from 08/31/2023.     Current Outpatient Medications on  File Prior to Visit   Medication Sig Dispense Refill    calcium carbonate (OS-EVANGELINA) 600 mg calcium (1,500 mg) Tab Take 600 mg by mouth once daily.      ciprofloxacin HCl (CIPRO) 500 MG tablet Take 1 tablet (500 mg total) by mouth 2 (two) times daily. for 7 days 14 tablet 0    levothyroxine (SYNTHROID) 137 MCG Tab tablet Take 1 tablet (137 mcg total) by mouth once daily. 30 tablet 11    ondansetron (ZOFRAN) 8 MG tablet Take 1 tablet (8 mg total) by mouth every 8 (eight) hours as needed for Nausea. 30 tablet 2    oxyCODONE-acetaminophen (PERCOCET) 7.5-325 mg per tablet Take 1 tablet by mouth every 8 (eight) hours as needed for Pain. 90 tablet 0     Current Facility-Administered Medications on File Prior to Visit   Medication Dose Route Frequency Provider Last Rate Last Admin    alteplase injection 2 mg  2 mg Intra-Catheter PRN Faith Swain, SIMON        heparin, porcine (PF) 100 unit/mL injection flush 500 Units  500 Units Intravenous PRN Faith Swain NP        pembrolizumab (KEYTRUDA) 200 mg in sodium chloride 0.9% 100 mL chemo infusion  200 mg Intravenous 1 time in Clinic/HOD Faith Swain NP        sodium chloride 0.9% 100 mL flush bag   Intravenous 1 time in Clinic/HOD Faith Swain NP        sodium chloride 0.9% flush 10 mL  10 mL Intravenous PRN Faith Swain NP           Review of patient's allergies indicates:  No Known Allergies  Past Medical History:   Diagnosis Date    Acute pyelonephritis 12/4/2023    Bone cancer     Cancer     Hypertension 12/24/2019    Lung cancer     Thyroid disease        Past Surgical History:   Procedure Laterality Date    BACK SURGERY      COLONOSCOPY      FINGER SURGERY      HERNIA REPAIR      LUNG BIOPSY      PORTACATH PLACEMENT         Family History   Problem Relation Age of Onset    Bladder Cancer Mother     Cancer Father     Rectal cancer Brother     Lung cancer Brother     Pancreatic cancer Other     Esophageal cancer Other        Social History     Socioeconomic History     "Marital status: Single   Tobacco Use    Smoking status: Every Day     Current packs/day: 0.25     Average packs/day: 0.3 packs/day for 30.0 years (7.5 ttl pk-yrs)     Types: Cigarettes    Smokeless tobacco: Never   Substance and Sexual Activity    Alcohol use: Yes     Alcohol/week: 2.0 standard drinks of alcohol     Types: 2 Cans of beer per week    Drug use: No       Review of Systems   Constitutional:  Positive for weight loss. Negative for chills, fever and malaise/fatigue.   Respiratory:  Negative for cough and shortness of breath.    Cardiovascular:  Negative for chest pain, palpitations, orthopnea and claudication.   Gastrointestinal:  Positive for abdominal pain. Negative for blood in stool, constipation, diarrhea, nausea and vomiting.   Genitourinary:  Positive for flank pain.   Skin:  Positive for rash.        Rash on the left side of the abdomen    Neurological:  Negative for dizziness.   Endo/Heme/Allergies:  Does not bruise/bleed easily.       Objective:   /83 (BP Location: Right arm, Patient Position: Sitting, BP Method: Medium (Automatic))   Pulse 94   Ht 5' 6" (1.676 m)   Wt 52.6 kg (115 lb 14.4 oz)   BMI 18.71 kg/m²     Physical Exam  Constitutional:       General: He is not in acute distress.     Appearance: Normal appearance. He is normal weight.   HENT:      Head: Normocephalic and atraumatic.   Cardiovascular:      Rate and Rhythm: Normal rate and regular rhythm.      Pulses:           Radial pulses are 2+ on the right side and 2+ on the left side.        Femoral pulses are 2+ on the right side and 2+ on the left side.  Pulmonary:      Effort: Pulmonary effort is normal. No respiratory distress.      Breath sounds: Normal breath sounds.   Abdominal:      General: Abdomen is flat. Bowel sounds are normal. There is no distension.      Palpations: Abdomen is soft. There is no mass.      Tenderness: There is abdominal tenderness. There is guarding.      Comments: Tenderness to the left side " of the abdomen    Skin:     Findings: Rash present.      Comments: Rash to the left side of the abdomen    Neurological:      General: No focal deficit present.      Mental Status: He is alert and oriented to person, place, and time. Mental status is at baseline.   Psychiatric:         Mood and Affect: Mood normal.         Behavior: Behavior normal.         Thought Content: Thought content normal.     Reviewed CT scans since 3/2018 to the most recent CT scan from 08/2023 - The area of SMA stenosis seen on the recent CT scan from 08/2023 appears to be stable since 08/2019. In the most recent CT scans there is progression in size of the L adrenal mass.     Assessment/Plan:   59 yo gentleman with past medical history significant for Stage IV lung cancer with metastases to the adrenal gland and bone presents to clinic for evaluation of abdominal pain in the setting of CT scan findings of superior mesenteric artery stenosis.     Patient's symptoms not consistent with chronic mesenteric ischemia. Furthermore, it appears that in 08/30/2019 he had a CTA (See Sectra) that showed a smooth narrowing of the proximal portion of the superior mesenteric artery. Looking at CT scans from that time, it appears that the narrowing has appeared fairly consistent.     In most recent CT scans, L adrenal mass has increased in size. Discussed with patient and sister symptoms consistent with chronic mesenteric ischemia. At this time, it appears that patient's symptoms are not consistent with this given the fact the CT findings have been consistent for several years and his symptoms started within the past several months. He has not significant celiac or DAYSI disease suggesting intact collateral circulation (typically chronic mesenteric ischemia you have 2/3 vessels that are compromised). His symptoms are localized to the left side. Lastly, his adrenal mass has been increasing on recent CT scans.     - Discussed with Faith Swain NP; Pain  appears to be more related to worsening size of adrenal mass; patient is to obtain repeat CT scan tomorrow  - Answered patient's questions to his satisfaction. Patient expressed understanding of the above plan.     Giacomo Rios MD  Vascular Surgery

## 2023-12-08 ENCOUNTER — TELEPHONE (OUTPATIENT)
Dept: HEMATOLOGY/ONCOLOGY | Facility: CLINIC | Age: 58
End: 2023-12-08

## 2023-12-11 ENCOUNTER — TELEPHONE (OUTPATIENT)
Dept: HEMATOLOGY/ONCOLOGY | Facility: CLINIC | Age: 58
End: 2023-12-11

## 2023-12-11 ENCOUNTER — OFFICE VISIT (OUTPATIENT)
Dept: HEMATOLOGY/ONCOLOGY | Facility: CLINIC | Age: 58
End: 2023-12-11
Payer: MEDICAID

## 2023-12-11 VITALS
RESPIRATION RATE: 18 BRPM | WEIGHT: 119 LBS | DIASTOLIC BLOOD PRESSURE: 84 MMHG | OXYGEN SATURATION: 96 % | SYSTOLIC BLOOD PRESSURE: 125 MMHG | HEIGHT: 66 IN | HEART RATE: 68 BPM | BODY MASS INDEX: 19.13 KG/M2

## 2023-12-11 DIAGNOSIS — C79.72 MALIGNANT NEOPLASM METASTATIC TO LEFT ADRENAL GLAND: ICD-10-CM

## 2023-12-11 DIAGNOSIS — C79.51 METASTATIC CANCER TO BONE: ICD-10-CM

## 2023-12-11 DIAGNOSIS — G89.3 CANCER RELATED PAIN: ICD-10-CM

## 2023-12-11 DIAGNOSIS — C34.92 MALIGNANT NEOPLASM OF LEFT LUNG STAGE 4: Primary | ICD-10-CM

## 2023-12-11 LAB
ALBUMIN SERPL BCP-MCNC: 3.8 G/DL (ref 3.4–5)
ALBUMIN/GLOBULIN RATIO: 0.86 RATIO (ref 1.1–1.8)
ALP SERPL-CCNC: 66 U/L (ref 46–116)
ALT SERPL W P-5'-P-CCNC: 26 U/L (ref 12–78)
ANION GAP SERPL CALC-SCNC: 11 MMOL/L (ref 3–11)
APPEARANCE, UA: CLEAR
AST SERPL-CCNC: 25 U/L (ref 15–37)
BACTERIA SPEC CULT: ABNORMAL /HPF
BASOPHILS NFR BLD: 0.5 % (ref 0–3)
BILIRUB SERPL-MCNC: 0.2 MG/DL (ref 0–1)
BILIRUB UR QL STRIP: NEGATIVE MG/DL
BUN SERPL-MCNC: 9 MG/DL (ref 7–18)
BUN/CREAT SERPL: 10.11 RATIO (ref 7–18)
CALCIUM SERPL-MCNC: 9.1 MG/DL (ref 8.8–10.5)
CHLORIDE SERPL-SCNC: 100 MMOL/L (ref 100–108)
CO2 SERPL-SCNC: 27 MMOL/L (ref 21–32)
COLOR UR: ABNORMAL
CREAT SERPL-MCNC: 0.89 MG/DL (ref 0.7–1.3)
EOSINOPHIL NFR BLD: 2 % (ref 1–3)
ERYTHROCYTE [DISTWIDTH] IN BLOOD BY AUTOMATED COUNT: 14.3 % (ref 12.5–18)
GFR ESTIMATION: > 60
GLOBULIN: 4.4 G/DL (ref 2.3–3.5)
GLUCOSE (UA): NORMAL MG/DL
GLUCOSE SERPL-MCNC: 111 MG/DL (ref 70–110)
HCT VFR BLD AUTO: 37.9 % (ref 42–52)
HGB BLD-MCNC: 12.8 G/DL (ref 14–18)
HGB UR QL STRIP: 50 /UL
KETONES UR QL STRIP: NEGATIVE MG/DL
LEUKOCYTE ESTERASE UR QL STRIP: 500 /UL
LYMPHOCYTES NFR BLD: 21.3 % (ref 25–40)
MCH RBC QN AUTO: 32.4 PG (ref 27–31.2)
MCHC RBC AUTO-ENTMCNC: 33.8 G/DL (ref 31.8–35.4)
MCV RBC AUTO: 95.9 FL (ref 80–97)
MONOCYTES NFR BLD: 3.5 % (ref 1–15)
MUCUS URINE: ABNORMAL /LPF
NEUTROPHILS # BLD AUTO: 4.31 10*3/UL (ref 1.8–7.7)
NEUTROPHILS NFR BLD: 71.5 % (ref 37–80)
NITRITE UR QL STRIP: NEGATIVE
NUCLEATED RED BLOOD CELLS: 0 %
PH UR STRIP: 5 PH (ref 5–9)
PLATELETS: 169 10*3/UL (ref 142–424)
POTASSIUM SERPL-SCNC: 3.5 MMOL/L (ref 3.6–5.2)
PROT SERPL-MCNC: 8.2 G/DL (ref 6.4–8.2)
PROT UR QL STRIP: 30 MG/DL
RBC # BLD AUTO: 3.95 10*6/UL (ref 4.7–6.1)
RBC #/AREA URNS HPF: ABNORMAL /HPF (ref 0–2)
SERVICE COMMENT 03: ABNORMAL
SODIUM BLD-SCNC: 138 MMOL/L (ref 135–145)
SP GR UR STRIP: 1.02 (ref 1–1.03)
SPECIMEN COLLECTION METHOD, URINE: ABNORMAL
SQUAMOUS EPITHELIAL, UA: ABNORMAL /LPF
TSH SERPL DL<=0.005 MIU/L-ACNC: 15.85 UIU/ML (ref 0.36–3.74)
UROBILINOGEN UR STRIP-ACNC: NORMAL MG/DL
WBC # BLD: 6 10*3/UL (ref 4.6–10.2)
WBC #/AREA URNS HPF: ABNORMAL /HPF (ref 0–5)

## 2023-12-11 PROCEDURE — 3008F PR BODY MASS INDEX (BMI) DOCUMENTED: ICD-10-PCS | Mod: CPTII,S$GLB,, | Performed by: NURSE PRACTITIONER

## 2023-12-11 PROCEDURE — 1159F PR MEDICATION LIST DOCUMENTED IN MEDICAL RECORD: ICD-10-PCS | Mod: CPTII,S$GLB,, | Performed by: NURSE PRACTITIONER

## 2023-12-11 PROCEDURE — 3074F PR MOST RECENT SYSTOLIC BLOOD PRESSURE < 130 MM HG: ICD-10-PCS | Mod: CPTII,S$GLB,, | Performed by: NURSE PRACTITIONER

## 2023-12-11 PROCEDURE — 99214 PR OFFICE/OUTPT VISIT, EST, LEVL IV, 30-39 MIN: ICD-10-PCS | Mod: S$GLB,,, | Performed by: NURSE PRACTITIONER

## 2023-12-11 PROCEDURE — 99214 OFFICE O/P EST MOD 30 MIN: CPT | Mod: S$GLB,,, | Performed by: NURSE PRACTITIONER

## 2023-12-11 PROCEDURE — 3079F PR MOST RECENT DIASTOLIC BLOOD PRESSURE 80-89 MM HG: ICD-10-PCS | Mod: CPTII,S$GLB,, | Performed by: NURSE PRACTITIONER

## 2023-12-11 PROCEDURE — 3074F SYST BP LT 130 MM HG: CPT | Mod: CPTII,S$GLB,, | Performed by: NURSE PRACTITIONER

## 2023-12-11 PROCEDURE — 3079F DIAST BP 80-89 MM HG: CPT | Mod: CPTII,S$GLB,, | Performed by: NURSE PRACTITIONER

## 2023-12-11 PROCEDURE — 1159F MED LIST DOCD IN RCRD: CPT | Mod: CPTII,S$GLB,, | Performed by: NURSE PRACTITIONER

## 2023-12-11 PROCEDURE — 3008F BODY MASS INDEX DOCD: CPT | Mod: CPTII,S$GLB,, | Performed by: NURSE PRACTITIONER
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
## 2023-12-13 LAB — URINE CULTURE, ROUTINE: NORMAL

## 2023-12-14 ENCOUNTER — TELEPHONE (OUTPATIENT)
Dept: HEMATOLOGY/ONCOLOGY | Facility: CLINIC | Age: 58
End: 2023-12-14

## 2023-12-14 RX ORDER — OXYCODONE AND ACETAMINOPHEN 7.5; 325 MG/1; MG/1
1 TABLET ORAL EVERY 8 HOURS PRN
Qty: 90 TABLET | Refills: 0 | Status: SHIPPED | OUTPATIENT
Start: 2023-12-14 | End: 2024-01-11 | Stop reason: SDUPTHER

## 2023-12-14 NOTE — TELEPHONE ENCOUNTER
Called the patient to discuss Surgical Oncology referral. Patient did not answer. I left a VM requesting a call back to discuss. Referral sent to Dr Babak Espinal's office. TTRN

## 2024-01-02 DIAGNOSIS — C34.92 MALIGNANT NEOPLASM OF LEFT LUNG STAGE 4: Primary | ICD-10-CM

## 2024-01-02 LAB
ALBUMIN SERPL BCP-MCNC: 3.7 G/DL (ref 3.4–5)
ALBUMIN/GLOBULIN RATIO: 0.9 RATIO (ref 1.1–1.8)
ALP SERPL-CCNC: 67 U/L (ref 46–116)
ALT SERPL W P-5'-P-CCNC: 19 U/L (ref 12–78)
ANION GAP SERPL CALC-SCNC: 10 MMOL/L (ref 3–11)
APPEARANCE, UA: CLEAR
AST SERPL-CCNC: 17 U/L (ref 15–37)
BACTERIA SPEC CULT: ABNORMAL /HPF
BASOPHILS NFR BLD: 0.4 % (ref 0–3)
BILIRUB SERPL-MCNC: 0.3 MG/DL (ref 0–1)
BILIRUB UR QL STRIP: NEGATIVE MG/DL
BUN SERPL-MCNC: 12 MG/DL (ref 7–18)
BUN/CREAT SERPL: 11 RATIO (ref 7–18)
CALCIUM SERPL-MCNC: 9.1 MG/DL (ref 8.8–10.5)
CHLORIDE SERPL-SCNC: 102 MMOL/L (ref 100–108)
CO2 SERPL-SCNC: 25 MMOL/L (ref 21–32)
COLOR UR: ABNORMAL
CREAT SERPL-MCNC: 1.09 MG/DL (ref 0.7–1.3)
EOSINOPHIL NFR BLD: 2.8 % (ref 1–3)
ERYTHROCYTE [DISTWIDTH] IN BLOOD BY AUTOMATED COUNT: 15.2 % (ref 12.5–18)
GFR ESTIMATION: > 60
GLOBULIN: 4.1 G/DL (ref 2.3–3.5)
GLUCOSE (UA): NORMAL MG/DL
GLUCOSE SERPL-MCNC: 99 MG/DL (ref 70–110)
HCT VFR BLD AUTO: 36.4 % (ref 42–52)
HGB BLD-MCNC: 12.1 G/DL (ref 14–18)
HGB UR QL STRIP: 10 /UL
KETONES UR QL STRIP: NEGATIVE MG/DL
LEUKOCYTE ESTERASE UR QL STRIP: NEGATIVE /UL
LYMPHOCYTES NFR BLD: 26.9 % (ref 25–40)
MCH RBC QN AUTO: 31.9 PG (ref 27–31.2)
MCHC RBC AUTO-ENTMCNC: 33.2 G/DL (ref 31.8–35.4)
MCV RBC AUTO: 96 FL (ref 80–97)
MONOCYTES NFR BLD: 6.3 % (ref 1–15)
NEUTROPHILS # BLD AUTO: 3.37 10*3/UL (ref 1.8–7.7)
NEUTROPHILS NFR BLD: 62.9 % (ref 37–80)
NITRITE UR QL STRIP: NEGATIVE
NUCLEATED RED BLOOD CELLS: 0 %
PH UR STRIP: 5 PH (ref 5–9)
PLATELETS: 159 10*3/UL (ref 142–424)
POTASSIUM SERPL-SCNC: 3.2 MMOL/L (ref 3.6–5.2)
PROT SERPL-MCNC: 7.8 G/DL (ref 6.4–8.2)
PROT UR QL STRIP: ABNORMAL MG/DL
RBC # BLD AUTO: 3.79 10*6/UL (ref 4.7–6.1)
RBC #/AREA URNS HPF: ABNORMAL /HPF (ref 0–2)
SERVICE COMMENT 03: ABNORMAL
SODIUM BLD-SCNC: 137 MMOL/L (ref 135–145)
SP GR UR STRIP: 1.02 (ref 1–1.03)
SPECIMEN COLLECTION METHOD, URINE: ABNORMAL
SQUAMOUS EPITHELIAL, UA: ABNORMAL /LPF
TSH SERPL DL<=0.005 MIU/L-ACNC: 19.66 UIU/ML (ref 0.36–3.74)
UROBILINOGEN UR STRIP-ACNC: NORMAL MG/DL
WBC # BLD: 5.4 10*3/UL (ref 4.6–10.2)
WBC #/AREA URNS HPF: ABNORMAL /HPF (ref 0–5)

## 2024-01-05 ENCOUNTER — DOCUMENTATION ONLY (OUTPATIENT)
Dept: HEMATOLOGY/ONCOLOGY | Facility: CLINIC | Age: 59
End: 2024-01-05
Payer: MEDICAID

## 2024-01-09 ENCOUNTER — TELEPHONE (OUTPATIENT)
Dept: HEMATOLOGY/ONCOLOGY | Facility: CLINIC | Age: 59
End: 2024-01-09
Payer: MEDICAID

## 2024-01-09 NOTE — TELEPHONE ENCOUNTER
Spoke to the patients sister who states that the patient wasn't feeling well so he was unable to make his appt with Dr Espinal. State she has the office number and will call to have it rescheduled. TTRN

## 2024-01-11 DIAGNOSIS — C34.92 MALIGNANT NEOPLASM OF LEFT LUNG STAGE 4: ICD-10-CM

## 2024-01-11 DIAGNOSIS — G89.3 CANCER RELATED PAIN: ICD-10-CM

## 2024-01-11 DIAGNOSIS — C79.51 METASTATIC CANCER TO BONE: ICD-10-CM

## 2024-01-12 RX ORDER — OXYCODONE AND ACETAMINOPHEN 7.5; 325 MG/1; MG/1
1 TABLET ORAL EVERY 8 HOURS PRN
Qty: 90 TABLET | Refills: 0 | Status: SHIPPED | OUTPATIENT
Start: 2024-01-12 | End: 2024-02-16 | Stop reason: SDUPTHER

## 2024-01-25 ENCOUNTER — TELEPHONE (OUTPATIENT)
Dept: HEMATOLOGY/ONCOLOGY | Facility: CLINIC | Age: 59
End: 2024-01-25

## 2024-01-25 NOTE — TELEPHONE ENCOUNTER
LVMx2 for pt to rtc       ----- Message from Lesly Madden sent at 1/25/2024 11:55 AM CST -----  Contact: pt siter  Pt sister Ingrid returning a missed call and can be reached at [phone]    Thanks,

## 2024-01-31 LAB
ALBUMIN SERPL BCP-MCNC: 3.8 G/DL (ref 3.4–5)
ALBUMIN/GLOBULIN RATIO: 0.95 RATIO (ref 1.1–1.8)
ALP SERPL-CCNC: 71 U/L (ref 46–116)
ALT SERPL W P-5'-P-CCNC: 9 U/L (ref 12–78)
ANION GAP SERPL CALC-SCNC: 11 MMOL/L (ref 3–11)
AST SERPL-CCNC: 16 U/L (ref 15–37)
BASOPHILS NFR BLD: 0.7 % (ref 0–3)
BILIRUB SERPL-MCNC: 0.7 MG/DL (ref 0–1)
BUN SERPL-MCNC: 8 MG/DL (ref 7–18)
BUN/CREAT SERPL: 9.19 RATIO (ref 7–18)
CALCIUM SERPL-MCNC: 10.2 MG/DL (ref 8.8–10.5)
CHLORIDE SERPL-SCNC: 99 MMOL/L (ref 100–108)
CO2 SERPL-SCNC: 28 MMOL/L (ref 21–32)
CREAT SERPL-MCNC: 0.87 MG/DL (ref 0.7–1.3)
EOSINOPHIL NFR BLD: 1.5 % (ref 1–3)
ERYTHROCYTE [DISTWIDTH] IN BLOOD BY AUTOMATED COUNT: 13.9 % (ref 12.5–18)
GFR ESTIMATION: > 60
GLOBULIN: 4 G/DL (ref 2.3–3.5)
GLUCOSE SERPL-MCNC: 96 MG/DL (ref 70–110)
HCT VFR BLD AUTO: 39.5 % (ref 42–52)
HGB BLD-MCNC: 13.3 G/DL (ref 14–18)
LYMPHOCYTES NFR BLD: 24.2 % (ref 25–40)
MCH RBC QN AUTO: 31.7 PG (ref 27–31.2)
MCHC RBC AUTO-ENTMCNC: 33.7 G/DL (ref 31.8–35.4)
MCV RBC AUTO: 94 FL (ref 80–97)
MONOCYTES NFR BLD: 6.6 % (ref 1–15)
NEUTROPHILS # BLD AUTO: 3.95 10*3/UL (ref 1.8–7.7)
NEUTROPHILS NFR BLD: 66.7 % (ref 37–80)
NUCLEATED RED BLOOD CELLS: 0 %
PLATELETS: 145 10*3/UL (ref 142–424)
POTASSIUM SERPL-SCNC: 3.6 MMOL/L (ref 3.6–5.2)
PROT SERPL-MCNC: 7.8 G/DL (ref 6.4–8.2)
RBC # BLD AUTO: 4.2 10*6/UL (ref 4.7–6.1)
SODIUM BLD-SCNC: 138 MMOL/L (ref 135–145)
TSH SERPL DL<=0.005 MIU/L-ACNC: 15.93 UIU/ML (ref 0.36–3.74)
WBC # BLD: 5.9 10*3/UL (ref 4.6–10.2)

## 2024-02-01 ENCOUNTER — OFFICE VISIT (OUTPATIENT)
Dept: HEMATOLOGY/ONCOLOGY | Facility: CLINIC | Age: 59
End: 2024-02-01
Payer: MEDICAID

## 2024-02-01 VITALS
WEIGHT: 107.69 LBS | HEART RATE: 97 BPM | BODY MASS INDEX: 17.31 KG/M2 | DIASTOLIC BLOOD PRESSURE: 77 MMHG | OXYGEN SATURATION: 94 % | SYSTOLIC BLOOD PRESSURE: 107 MMHG | HEIGHT: 66 IN | RESPIRATION RATE: 17 BRPM

## 2024-02-01 DIAGNOSIS — C34.92 MALIGNANT NEOPLASM OF LEFT LUNG STAGE 4: Primary | ICD-10-CM

## 2024-02-01 DIAGNOSIS — R64 CANCER CACHEXIA: ICD-10-CM

## 2024-02-01 DIAGNOSIS — C79.72 MALIGNANT NEOPLASM METASTATIC TO LEFT ADRENAL GLAND: ICD-10-CM

## 2024-02-01 DIAGNOSIS — F17.200 SMOKER: ICD-10-CM

## 2024-02-01 DIAGNOSIS — C79.51 METASTATIC CANCER TO BONE: ICD-10-CM

## 2024-02-01 DIAGNOSIS — E03.9 HYPOTHYROIDISM, UNSPECIFIED TYPE: ICD-10-CM

## 2024-02-01 PROBLEM — C79.71 MALIGNANT NEOPLASM METASTATIC TO RIGHT ADRENAL GLAND: Status: RESOLVED | Noted: 2020-12-10 | Resolved: 2024-02-01

## 2024-02-01 PROCEDURE — 3008F BODY MASS INDEX DOCD: CPT | Mod: CPTII,S$GLB,, | Performed by: NURSE PRACTITIONER

## 2024-02-01 PROCEDURE — 3074F SYST BP LT 130 MM HG: CPT | Mod: CPTII,S$GLB,, | Performed by: NURSE PRACTITIONER

## 2024-02-01 PROCEDURE — 1159F MED LIST DOCD IN RCRD: CPT | Mod: CPTII,S$GLB,, | Performed by: NURSE PRACTITIONER

## 2024-02-01 PROCEDURE — 3078F DIAST BP <80 MM HG: CPT | Mod: CPTII,S$GLB,, | Performed by: NURSE PRACTITIONER

## 2024-02-01 PROCEDURE — 99215 OFFICE O/P EST HI 40 MIN: CPT | Mod: S$GLB,,, | Performed by: NURSE PRACTITIONER

## 2024-02-01 RX ORDER — SODIUM CHLORIDE 0.9 % (FLUSH) 0.9 %
10 SYRINGE (ML) INJECTION
Status: CANCELLED | OUTPATIENT
Start: 2024-02-01

## 2024-02-01 RX ORDER — HEPARIN 100 UNIT/ML
500 SYRINGE INTRAVENOUS
Status: CANCELLED | OUTPATIENT
Start: 2024-02-01

## 2024-02-01 RX ORDER — MEGESTROL ACETATE 40 MG/ML
200 SUSPENSION ORAL
Qty: 450 ML | Refills: 11 | Status: SHIPPED | OUTPATIENT
Start: 2024-02-01 | End: 2024-03-25 | Stop reason: SDUPTHER
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
## 2024-02-07 ENCOUNTER — TELEPHONE (OUTPATIENT)
Dept: SMOKING CESSATION | Facility: CLINIC | Age: 59
End: 2024-02-07
Payer: MEDICAID

## 2024-02-07 NOTE — TELEPHONE ENCOUNTER
Attempted to contact patient to schedule a [facility] appointment.  Patient did not answer.  Counselor left a voice message with name and contact information.

## 2024-02-16 DIAGNOSIS — G89.3 CANCER RELATED PAIN: ICD-10-CM

## 2024-02-16 DIAGNOSIS — C34.92 MALIGNANT NEOPLASM OF LEFT LUNG STAGE 4: ICD-10-CM

## 2024-02-16 DIAGNOSIS — C79.51 METASTATIC CANCER TO BONE: ICD-10-CM

## 2024-02-18 RX ORDER — OXYCODONE AND ACETAMINOPHEN 7.5; 325 MG/1; MG/1
1 TABLET ORAL EVERY 8 HOURS PRN
Qty: 90 TABLET | Refills: 0 | Status: SHIPPED | OUTPATIENT
Start: 2024-02-18 | End: 2024-03-19 | Stop reason: SDUPTHER

## 2024-02-19 DIAGNOSIS — C79.51 METASTATIC CANCER TO BONE: ICD-10-CM

## 2024-02-19 DIAGNOSIS — G89.3 CANCER RELATED PAIN: ICD-10-CM

## 2024-02-19 DIAGNOSIS — C34.92 MALIGNANT NEOPLASM OF LEFT LUNG STAGE 4: ICD-10-CM

## 2024-02-19 RX ORDER — OXYCODONE AND ACETAMINOPHEN 7.5; 325 MG/1; MG/1
1 TABLET ORAL EVERY 8 HOURS PRN
Qty: 90 TABLET | Refills: 0 | OUTPATIENT
Start: 2024-02-19 | End: 2025-02-18
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
## 2024-02-20 ENCOUNTER — PATIENT MESSAGE (OUTPATIENT)
Dept: HEMATOLOGY/ONCOLOGY | Facility: CLINIC | Age: 59
End: 2024-02-20
Payer: MEDICAID

## 2024-02-21 ENCOUNTER — OFFICE VISIT (OUTPATIENT)
Dept: SURGICAL ONCOLOGY | Facility: CLINIC | Age: 59
End: 2024-02-21
Payer: MEDICAID

## 2024-02-21 VITALS
DIASTOLIC BLOOD PRESSURE: 75 MMHG | HEIGHT: 66 IN | BODY MASS INDEX: 17.71 KG/M2 | HEART RATE: 105 BPM | WEIGHT: 110.19 LBS | SYSTOLIC BLOOD PRESSURE: 108 MMHG

## 2024-02-21 DIAGNOSIS — C79.72 MALIGNANT NEOPLASM METASTATIC TO LEFT ADRENAL GLAND: ICD-10-CM

## 2024-02-21 PROCEDURE — 3078F DIAST BP <80 MM HG: CPT | Mod: CPTII,,, | Performed by: SURGERY

## 2024-02-21 PROCEDURE — 99999 PR PBB SHADOW E&M-EST. PATIENT-LVL III: CPT | Mod: PBBFAC,,, | Performed by: SURGERY

## 2024-02-21 PROCEDURE — 99213 OFFICE O/P EST LOW 20 MIN: CPT | Mod: PBBFAC | Performed by: SURGERY

## 2024-02-21 PROCEDURE — 99203 OFFICE O/P NEW LOW 30 MIN: CPT | Mod: S$PBB,,, | Performed by: SURGERY

## 2024-02-21 PROCEDURE — 1159F MED LIST DOCD IN RCRD: CPT | Mod: CPTII,,, | Performed by: SURGERY

## 2024-02-21 PROCEDURE — 3074F SYST BP LT 130 MM HG: CPT | Mod: CPTII,,, | Performed by: SURGERY

## 2024-02-21 PROCEDURE — 3008F BODY MASS INDEX DOCD: CPT | Mod: CPTII,,, | Performed by: SURGERY

## 2024-02-29 ENCOUNTER — DOCUMENTATION ONLY (OUTPATIENT)
Dept: HEMATOLOGY/ONCOLOGY | Facility: CLINIC | Age: 59
End: 2024-02-29

## 2024-02-29 ENCOUNTER — OFFICE VISIT (OUTPATIENT)
Dept: HEMATOLOGY/ONCOLOGY | Facility: CLINIC | Age: 59
End: 2024-02-29
Payer: MEDICAID

## 2024-02-29 VITALS
WEIGHT: 112 LBS | DIASTOLIC BLOOD PRESSURE: 83 MMHG | HEART RATE: 88 BPM | RESPIRATION RATE: 18 BRPM | BODY MASS INDEX: 18 KG/M2 | OXYGEN SATURATION: 95 % | HEIGHT: 66 IN | SYSTOLIC BLOOD PRESSURE: 124 MMHG

## 2024-02-29 DIAGNOSIS — T45.1X5A CINV (CHEMOTHERAPY-INDUCED NAUSEA AND VOMITING): ICD-10-CM

## 2024-02-29 DIAGNOSIS — R64 CANCER CACHEXIA: ICD-10-CM

## 2024-02-29 DIAGNOSIS — G89.3 CANCER RELATED PAIN: ICD-10-CM

## 2024-02-29 DIAGNOSIS — R11.2 CINV (CHEMOTHERAPY-INDUCED NAUSEA AND VOMITING): ICD-10-CM

## 2024-02-29 DIAGNOSIS — C34.92 MALIGNANT NEOPLASM OF LEFT LUNG STAGE 4: Primary | ICD-10-CM

## 2024-02-29 PROCEDURE — 3079F DIAST BP 80-89 MM HG: CPT | Mod: CPTII,S$GLB,, | Performed by: NURSE PRACTITIONER

## 2024-02-29 PROCEDURE — 3074F SYST BP LT 130 MM HG: CPT | Mod: CPTII,S$GLB,, | Performed by: NURSE PRACTITIONER

## 2024-02-29 PROCEDURE — 1159F MED LIST DOCD IN RCRD: CPT | Mod: CPTII,S$GLB,, | Performed by: NURSE PRACTITIONER

## 2024-02-29 PROCEDURE — 3008F BODY MASS INDEX DOCD: CPT | Mod: CPTII,S$GLB,, | Performed by: NURSE PRACTITIONER

## 2024-02-29 PROCEDURE — 99215 OFFICE O/P EST HI 40 MIN: CPT | Mod: S$GLB,,, | Performed by: NURSE PRACTITIONER

## 2024-02-29 RX ORDER — FOLIC ACID 1 MG/1
1 TABLET ORAL DAILY
Qty: 30 TABLET | Refills: 6 | Status: SHIPPED | OUTPATIENT
Start: 2024-02-29 | End: 2025-02-28

## 2024-02-29 RX ORDER — CYANOCOBALAMIN 1000 UG/ML
1000 INJECTION, SOLUTION INTRAMUSCULAR; SUBCUTANEOUS ONCE
Status: COMPLETED | OUTPATIENT
Start: 2024-02-29 | End: 2024-02-29

## 2024-02-29 RX ADMIN — CYANOCOBALAMIN 1000 MCG: 1000 INJECTION, SOLUTION INTRAMUSCULAR; SUBCUTANEOUS at 12:02
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
## 2024-03-01 ENCOUNTER — TELEPHONE (OUTPATIENT)
Dept: HEMATOLOGY/ONCOLOGY | Facility: CLINIC | Age: 59
End: 2024-03-01
Payer: MEDICAID

## 2024-03-01 ENCOUNTER — PATIENT MESSAGE (OUTPATIENT)
Dept: HEMATOLOGY/ONCOLOGY | Facility: CLINIC | Age: 59
End: 2024-03-01
Payer: MEDICAID

## 2024-03-01 PROBLEM — R64 CANCER CACHEXIA: Status: ACTIVE | Noted: 2024-03-01

## 2024-03-01 NOTE — TELEPHONE ENCOUNTER
Returned Patients call and explained the treatment and schedule for March for lab and to see Provider. Patient verbally understood and had no additional questions. CM 3/1/2024

## 2024-03-04 PROBLEM — N10 ACUTE PYELONEPHRITIS: Status: RESOLVED | Noted: 2023-12-04 | Resolved: 2024-03-04

## 2024-03-04 RX ORDER — PROMETHAZINE HYDROCHLORIDE 25 MG/1
25 TABLET ORAL EVERY 8 HOURS
Qty: 30 TABLET | Refills: 3 | Status: SHIPPED | OUTPATIENT
Start: 2024-03-04

## 2024-03-04 RX ORDER — ONDANSETRON 8 MG/1
8 TABLET, ORALLY DISINTEGRATING ORAL EVERY 6 HOURS PRN
Qty: 30 TABLET | Refills: 3 | Status: SHIPPED | OUTPATIENT
Start: 2024-03-04 | End: 2025-03-04

## 2024-03-14 ENCOUNTER — PATIENT MESSAGE (OUTPATIENT)
Dept: HEMATOLOGY/ONCOLOGY | Facility: CLINIC | Age: 59
End: 2024-03-14

## 2024-03-18 ENCOUNTER — TELEPHONE (OUTPATIENT)
Dept: HEMATOLOGY/ONCOLOGY | Facility: CLINIC | Age: 59
End: 2024-03-18
Payer: MEDICAID

## 2024-03-19 ENCOUNTER — OFFICE VISIT (OUTPATIENT)
Dept: HEMATOLOGY/ONCOLOGY | Facility: CLINIC | Age: 59
End: 2024-03-19
Payer: MEDICAID

## 2024-03-19 VITALS
HEIGHT: 66 IN | WEIGHT: 107.31 LBS | OXYGEN SATURATION: 96 % | RESPIRATION RATE: 17 BRPM | BODY MASS INDEX: 17.24 KG/M2 | DIASTOLIC BLOOD PRESSURE: 83 MMHG | SYSTOLIC BLOOD PRESSURE: 120 MMHG | HEART RATE: 94 BPM

## 2024-03-19 DIAGNOSIS — C34.92 MALIGNANT NEOPLASM OF LEFT LUNG STAGE 4: ICD-10-CM

## 2024-03-19 DIAGNOSIS — G89.3 CANCER RELATED PAIN: ICD-10-CM

## 2024-03-19 DIAGNOSIS — R31.9 HEMATURIA, UNSPECIFIED TYPE: Primary | ICD-10-CM

## 2024-03-19 DIAGNOSIS — C79.51 METASTATIC CANCER TO BONE: ICD-10-CM

## 2024-03-19 PROCEDURE — 99215 OFFICE O/P EST HI 40 MIN: CPT | Mod: S$GLB,,, | Performed by: INTERNAL MEDICINE

## 2024-03-19 PROCEDURE — 3074F SYST BP LT 130 MM HG: CPT | Mod: CPTII,S$GLB,, | Performed by: INTERNAL MEDICINE

## 2024-03-19 PROCEDURE — 3079F DIAST BP 80-89 MM HG: CPT | Mod: CPTII,S$GLB,, | Performed by: INTERNAL MEDICINE

## 2024-03-19 PROCEDURE — 1159F MED LIST DOCD IN RCRD: CPT | Mod: CPTII,S$GLB,, | Performed by: INTERNAL MEDICINE

## 2024-03-19 PROCEDURE — 3008F BODY MASS INDEX DOCD: CPT | Mod: CPTII,S$GLB,, | Performed by: INTERNAL MEDICINE

## 2024-03-19 RX ORDER — OXYCODONE AND ACETAMINOPHEN 7.5; 325 MG/1; MG/1
1 TABLET ORAL EVERY 8 HOURS PRN
Qty: 90 TABLET | Refills: 0 | Status: SHIPPED | OUTPATIENT
Start: 2024-03-19 | End: 2025-03-19

## 2024-03-19 RX ORDER — OXYCODONE AND ACETAMINOPHEN 7.5; 325 MG/1; MG/1
1 TABLET ORAL EVERY 8 HOURS PRN
Qty: 90 TABLET | Refills: 0 | OUTPATIENT
Start: 2024-03-19 | End: 2025-03-19
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
## 2024-03-21 ENCOUNTER — OFFICE VISIT (OUTPATIENT)
Dept: UROLOGY | Facility: CLINIC | Age: 59
End: 2024-03-21
Payer: MEDICAID

## 2024-03-21 ENCOUNTER — TELEPHONE (OUTPATIENT)
Dept: UROLOGY | Facility: CLINIC | Age: 59
End: 2024-03-21

## 2024-03-21 VITALS
SYSTOLIC BLOOD PRESSURE: 118 MMHG | WEIGHT: 107 LBS | DIASTOLIC BLOOD PRESSURE: 80 MMHG | HEIGHT: 66 IN | HEART RATE: 104 BPM | RESPIRATION RATE: 12 BRPM | BODY MASS INDEX: 17.19 KG/M2

## 2024-03-21 DIAGNOSIS — R82.90 ABNORMAL URINALYSIS: ICD-10-CM

## 2024-03-21 DIAGNOSIS — R31.0 GROSS HEMATURIA: Primary | ICD-10-CM

## 2024-03-21 DIAGNOSIS — Z80.52 FAMILY HISTORY OF BLADDER CANCER: ICD-10-CM

## 2024-03-21 DIAGNOSIS — R39.198 DIFFICULTY VOIDING: ICD-10-CM

## 2024-03-21 DIAGNOSIS — C34.92 MALIGNANT NEOPLASM OF LEFT LUNG STAGE 4: ICD-10-CM

## 2024-03-21 LAB
BILIRUBIN, UA POC OHS: ABNORMAL
BLOOD, UA POC OHS: ABNORMAL
CLARITY, UA POC OHS: ABNORMAL
COLOR, UA POC OHS: ABNORMAL
GLUCOSE, UA POC OHS: NEGATIVE
KETONES, UA POC OHS: 15
LEUKOCYTES, UA POC OHS: ABNORMAL
NITRITE, UA POC OHS: NEGATIVE
PH, UA POC OHS: 5.5
PROTEIN, UA POC OHS: >=300
SPECIFIC GRAVITY, UA POC OHS: 1.02
UROBILINOGEN, UA POC OHS: 2

## 2024-03-21 PROCEDURE — 3079F DIAST BP 80-89 MM HG: CPT | Mod: CPTII,S$GLB,, | Performed by: NURSE PRACTITIONER

## 2024-03-21 PROCEDURE — 99204 OFFICE O/P NEW MOD 45 MIN: CPT | Mod: S$GLB,,, | Performed by: NURSE PRACTITIONER

## 2024-03-21 PROCEDURE — 3074F SYST BP LT 130 MM HG: CPT | Mod: CPTII,S$GLB,, | Performed by: NURSE PRACTITIONER

## 2024-03-21 PROCEDURE — 3008F BODY MASS INDEX DOCD: CPT | Mod: CPTII,S$GLB,, | Performed by: NURSE PRACTITIONER

## 2024-03-21 PROCEDURE — 81003 URINALYSIS AUTO W/O SCOPE: CPT | Mod: QW,S$GLB,, | Performed by: NURSE PRACTITIONER

## 2024-03-21 PROCEDURE — 1160F RVW MEDS BY RX/DR IN RCRD: CPT | Mod: CPTII,S$GLB,, | Performed by: NURSE PRACTITIONER

## 2024-03-21 PROCEDURE — 1159F MED LIST DOCD IN RCRD: CPT | Mod: CPTII,S$GLB,, | Performed by: NURSE PRACTITIONER

## 2024-03-21 RX ORDER — TAMSULOSIN HYDROCHLORIDE 0.4 MG/1
0.4 CAPSULE ORAL DAILY
Qty: 30 CAPSULE | Refills: 11 | Status: SHIPPED | OUTPATIENT
Start: 2024-03-21 | End: 2025-03-21

## 2024-03-21 NOTE — PROGRESS NOTES
Subjective:       Patient ID: Jaime Motta is a 58 y.o. male.    Chief Complaint: Hematuria (With clots) and Dysuria (Scale of 10)      HPI: 58-year-old male, new to [facility], referred by Oncology.    Patient is under the care of Oncology for stage IV lung cancer.    He is referred for blood in his urine.    Patient is having visible blood in his urine.  He does family history of bladder cancer.  Did have a CT done on 8 01/30/2023.  CT showed neoplasm involving the left adrenal gland left upper renal pole.  Also has some bilateral renal cyst.  Bladder wall thickening is noted.  Patient was evaluated by a urologist in the [place] area, per patient in his family.  According to the patient is family the urologist did not recommend any surgery regarding the adrenal gland or kidney.    At this time patient is denying any pain or burning with urination.  He denies any odor to the urine.  Denies any fever.  He does complain of some difficulty voiding.  Will have difficulty starting his stream.  He has a slow weak stream.  Family states he had some imaging done while at [facility] last week.  According to family they state that had a large prostate.  Also states that there was difficulty inserting catheter.    He is currently on antibiotics for UTI.  No other urinary complaints at this time.         Past Medical History:   Past Medical History:   Diagnosis Date    Acute pyelonephritis 12/04/2023    Bone cancer     Cancer     metastatic    Hematuria, unspecified     Hypertension 12/24/2019    Lung cancer     Thyroid disease        Past Surgical Historical:   Past Surgical History:   Procedure Laterality Date    BACK SURGERY      BREAST SURGERY  2023    COLONOSCOPY      FINGER SURGERY      FRACTURE SURGERY      HERNIA REPAIR      LUNG BIOPSY      PORTACATH PLACEMENT      SPINE SURGERY  2017    Tumor removal        Medications:   Medication List with Changes/Refills   New Medications    TAMSULOSIN (FLOMAX)  0.4 MG CAP    Take 1 capsule (0.4 mg total) by mouth once daily.   Current Medications    CALCIUM CARBONATE (OS-EVANGELINA) 600 MG CALCIUM (1,500 MG) TAB    Take 600 mg by mouth once daily.    FOLIC ACID (FOLVITE) 1 MG TABLET    Take 1 tablet (1 mg total) by mouth once daily.    LEVOTHYROXINE (SYNTHROID) 137 MCG TAB TABLET    Take 1 tablet (137 mcg total) by mouth once daily.    MEGESTROL (MEGACE) 400 MG/10 ML (40 MG/ML) SUSP    Take 5 mLs (200 mg total) by mouth 3 (three) times daily with meals.    ONDANSETRON (ZOFRAN-ODT) 8 MG TBDL    Take 1 tablet (8 mg total) by mouth every 6 (six) hours as needed.    OXYCODONE-ACETAMINOPHEN (PERCOCET) 7.5-325 MG PER TABLET    Take 1 tablet by mouth every 8 (eight) hours as needed for Pain.    PROMETHAZINE (PHENERGAN) 25 MG TABLET    Take 1 tablet (25 mg total) by mouth every 8 (eight) hours.        Past Social History:   Social History     Socioeconomic History    Marital status: Single   Tobacco Use    Smoking status: Every Day     Current packs/day: 0.25     Average packs/day: 0.3 packs/day for 30.0 years (7.5 ttl pk-yrs)     Types: Cigarettes    Smokeless tobacco: Never    Tobacco comments:     Began in 1984   Substance and Sexual Activity    Alcohol use: Yes     Alcohol/week: 2.0 standard drinks of alcohol     Types: 2 Cans of beer per week    Drug use: No    Sexual activity: Not Currently     Partners: Female     Birth control/protection: None     Social Determinants of Health     Financial Resource Strain: Medium Risk (3/13/2024)    Overall Financial Resource Strain (CARDIA)     Difficulty of Paying Living Expenses: Somewhat hard   Food Insecurity: Food Insecurity Present (3/13/2024)    Hunger Vital Sign     Worried About Running Out of Food in the Last Year: Sometimes true     Ran Out of Food in the Last Year: Sometimes true   Transportation Needs: No Transportation Needs (3/13/2024)    PRAPARE - Transportation     Lack of Transportation (Medical): No     Lack of Transportation  (Non-Medical): No   Physical Activity: Inactive (3/13/2024)    Exercise Vital Sign     Days of Exercise per Week: 0 days     Minutes of Exercise per Session: 0 min   Stress: No Stress Concern Present (3/13/2024)    [employer] [place] [employer] - Occupational Stress Questionnaire     Feeling of Stress : Only a little   Social Connections: Unknown (3/13/2024)    Social Connection and Isolation Panel [NHANES]     Frequency of Communication with Friends and Family: Three times a week     Frequency of Social Gatherings with Friends and Family: Twice a week     Active Member of Clubs or Organizations: No     Attends Club or Organization Meetings: Never     Marital Status: Never    Housing Stability: Low Risk  (3/13/2024)    Housing Stability Vital Sign     Unable to Pay for Housing in the Last Year: No     Number of Places Lived in the Last Year: 1     Unstable Housing in the Last Year: No       Allergies:   Review of patient's allergies indicates:   Allergen Reactions    Lisinopril Edema     Swelling to face         Family History:   Family History   Problem Relation Age of Onset    Cancer Father     Alcohol abuse Father     Diabetes Father     Heart disease Father     Bladder Cancer Mother     Cancer Mother     Diabetes Mother     Diabetes Maternal Aunt     Cancer Maternal Uncle     Cancer Paternal Uncle     Diabetes Maternal Grandmother     Diabetes Sister     Rectal cancer Brother     Cancer Brother     Lung cancer Brother     Cancer Brother     Diabetes Brother     Pancreatic cancer Other     Esophageal cancer Other         Review of Systems:  Review of Systems   Constitutional:  Negative for activity change and appetite change.   HENT:  Negative for congestion and dental problem.    Eyes:  Negative for visual disturbance.   Respiratory:  Negative for chest tightness and shortness of breath.    Cardiovascular:  Negative for chest pain.   Gastrointestinal:  Negative for abdominal distention and  abdominal pain.   Genitourinary:  Positive for hematuria. Negative for decreased urine volume, difficulty urinating, dysuria, enuresis, flank pain, frequency, genital sores, penile discharge, penile pain, penile swelling, scrotal swelling, testicular pain and urgency.   Musculoskeletal:  Negative for back pain and neck pain.   Skin:  Negative for color change.   Neurological:  Negative for dizziness.   Hematological:  Negative for adenopathy.   Psychiatric/Behavioral:  Negative for agitation, behavioral problems and confusion.        Physical Exam:  Physical Exam  Vitals and nursing note reviewed.   Constitutional:       Appearance: He is well-developed.   HENT:      Head: Normocephalic.   Eyes:      Pupils: Pupils are equal, round, and reactive to light.   Cardiovascular:      Rate and Rhythm: Normal rate and regular rhythm.      Heart sounds: Normal heart sounds.   Pulmonary:      Effort: Pulmonary effort is normal.      Breath sounds: Normal breath sounds.   Abdominal:      General: Bowel sounds are normal.      Palpations: Abdomen is soft.   Musculoskeletal:         General: Normal range of motion.      Cervical back: Normal range of motion and neck supple.   Skin:     General: Skin is warm and dry.   Neurological:      Mental Status: He is alert and oriented to person, place, and time.   Psychiatric:         Behavior: Behavior normal.       Urinalysis:  Large blood, red blood cells too numerous to count.  Large leukocytes, blood cells 25-50, bacteria +1.  Bladder scan:  4 cc    Assessment/Plan:   1. Difficulty voiding/gross hematuria:  Will start patient on Flomax 0.4 mg daily.    Will schedule patient for cysto for further evaluation.  Dr. Butterfield.      2. Abnormal urinalysis:  Culture the urine.  He is on antibiotics at this time.  We will confirm antibiotics.    Will change antibiotics if indicated.      Follow-up to arrange pending cysto.  Problem List Items Addressed This Visit          Oncology     Malignant neoplasm of left lung stage 4     Other Visit Diagnoses       Family history of bladder cancer    -  Primary    Gross hematuria        Relevant Orders    Cystoscopy    Difficulty voiding        Relevant Medications    tamsulosin (FLOMAX) 0.4 mg Cap    Other Relevant Orders    Cystoscopy    POCT Bladder Scan    Abnormal urinalysis        Relevant Orders    Urine culture

## 2024-03-21 NOTE — PROGRESS NOTES
Per pt's sister kidney cancer into vessels and nerves. She stated Dr Lester wants bladder flushed out but pt has BPH and no one has been able to get a cath in.

## 2024-03-23 LAB — URINE CULTURE, ROUTINE: NORMAL

## 2024-03-25 ENCOUNTER — TELEPHONE (OUTPATIENT)
Dept: UROLOGY | Facility: CLINIC | Age: 59
End: 2024-03-25
Payer: MEDICAID

## 2024-03-25 DIAGNOSIS — R64 CANCER CACHEXIA: ICD-10-CM

## 2024-03-25 RX ORDER — EPINEPHRINE 0.3 MG/.3ML
0.3 INJECTION SUBCUTANEOUS ONCE AS NEEDED
OUTPATIENT
Start: 2024-04-04

## 2024-03-25 RX ORDER — CYANOCOBALAMIN 1000 UG/ML
1000 INJECTION, SOLUTION INTRAMUSCULAR; SUBCUTANEOUS
OUTPATIENT
Start: 2024-04-04

## 2024-03-25 RX ORDER — HEPARIN 100 UNIT/ML
500 SYRINGE INTRAVENOUS
OUTPATIENT
Start: 2024-04-04

## 2024-03-25 RX ORDER — SODIUM CHLORIDE 0.9 % (FLUSH) 0.9 %
10 SYRINGE (ML) INJECTION
OUTPATIENT
Start: 2024-04-04

## 2024-03-25 RX ORDER — DIPHENHYDRAMINE HYDROCHLORIDE 50 MG/ML
50 INJECTION INTRAMUSCULAR; INTRAVENOUS ONCE AS NEEDED
OUTPATIENT
Start: 2024-04-04

## 2024-03-25 RX ORDER — PROCHLORPERAZINE EDISYLATE 5 MG/ML
10 INJECTION INTRAMUSCULAR; INTRAVENOUS ONCE AS NEEDED
OUTPATIENT
Start: 2024-04-04

## 2024-03-25 RX ORDER — MEGESTROL ACETATE 40 MG/ML
200 SUSPENSION ORAL
Qty: 450 ML | Refills: 11 | Status: SHIPPED | OUTPATIENT
Start: 2024-03-25 | End: 2024-03-28 | Stop reason: SDUPTHER

## 2024-03-25 NOTE — TELEPHONE ENCOUNTER
----- Message from Fransisco Nur NP sent at 3/25/2024  8:37 AM CDT -----  No growth on urine culture

## 2024-03-26 ENCOUNTER — PROCEDURE VISIT (OUTPATIENT)
Dept: UROLOGY | Facility: CLINIC | Age: 59
End: 2024-03-26
Payer: MEDICAID

## 2024-03-26 VITALS
SYSTOLIC BLOOD PRESSURE: 155 MMHG | DIASTOLIC BLOOD PRESSURE: 93 MMHG | BODY MASS INDEX: 17.27 KG/M2 | WEIGHT: 107 LBS | OXYGEN SATURATION: 96 % | HEART RATE: 87 BPM | RESPIRATION RATE: 16 BRPM

## 2024-03-26 DIAGNOSIS — R39.198 DIFFICULTY VOIDING: ICD-10-CM

## 2024-03-26 DIAGNOSIS — R31.0 GROSS HEMATURIA: ICD-10-CM

## 2024-03-26 PROCEDURE — 52000 CYSTOURETHROSCOPY: CPT | Mod: S$GLB,,, | Performed by: UROLOGY

## 2024-03-26 NOTE — PROCEDURES
Cystoscopy    Date/Time: 3/26/2024 8:00 AM    Performed by: Jhony Butterfield MD  Authorized by: Fransisco Nur NP    Timeout: prior to procedure the correct patient, procedure, and site was verified    Prep: patient was prepped and draped in usual sterile fashion    Anesthesia:  Intraurethral instillation  Indications: hematuria    Position:  Supine  Anesthesia:  Intraurethral instillation  Patient sedated?: No    Preparation: Patient was prepped and draped in usual sterile fashion    Scope type:  Flexible cystoscope  External exam normal: Yes    Urethra normal: Yes    Prostate normal: Yes    Comments:      Patient was brought to the procedure room placed on the table padded prepped and draped in usual sterile fashion in supine position. The cystoscope was inserted into the urethra and advanced the urethra was normal until the distal bulbar urethra at which point there was a obvious stricture noted proximally 8 Dominican scope could not be advanced beyond this therefore the procedure was terminated    Impression:  Patient has metastatic lung cancer, currently receiving chemotherapy he got some antibiotics for UTI is no longer having gross hematuria this point with the patient's other issues I feel it is prudent and the patient agrees, will hold off on intervention on in his urethral stricture unless he continues to have gross hematuria which point we will proceed with DVIU and completion of cystoscopy

## 2024-03-26 NOTE — PATIENT INSTRUCTIONS
Patient Education       Cystoscopy Discharge Instructions   About this topic   Your kidneys make urine. It is stored in your bladder. The urethra is a tube at the bottom of the bladder. Urine flows out of this tube. Sometimes, there is a blockage and urine is not able to leave the body.  A cystoscopy is a procedure that lets the doctor see the inside of your bladder and urethra. The doctor does it to:  Look for stones or tumors blocking the bladder and urethra  Look for changes or injury inside the bladder  Take a tissue sample from the inside of your bladder  Look for reasons for blood in the urine, pain with urination, or why you are passing urine often  Look for prostate problems     What care is needed at home?   Ask your doctor what you need to do when you go home. Make sure you ask questions if you do not understand what the doctor says. This way you will know what you need to do.  Take a warm bath or use a warm wet washcloth over the opening to the urethra. This will help to ease any pain. Do this as needed.  Drink 6 to 8 glasses of water a day and 3 to 4 glasses in the first few hours after the procedure to flush out your bladder and reduce irritation.  You may see some blood in your urine for a few days. This is normal.  Empty your bladder as soon as you feel the need to. Don't delay going to the bathroom. It stretches and weakens the bladder.  What follow-up care is needed?   Your doctor may ask you to make visits to the office to check on your progress. Be sure to keep these visits.  If you had a biopsy, talk with your doctor about the results.  What drugs may be needed?   The doctor may order drugs to:  Help with pain  Fight an infection  Help with bladder spasms  Will physical activity be limited?   Talk to your doctor about when you may go back to your normal activities like work, driving, or sex.  What problems could happen?   Bleeding  Infection  Injury to the bladder and urethra  Discomfort in the  urethra area  Burning sensation for a short time  Upset stomach  When do I need to call the doctor?   Signs of infection. These include a fever of 100.4°F (38°C) or higher, chills, pain with passing urine.  Pain that does not go away even with drugs or that lasts longer than 2 days  Too much blood in your urine  Passing large dime-sized clots  Cloudy urine  Little or no urine or not able to pass urine  Abdominal pain and nausea  Teach Back: Helping You Understand   The Teach Back Method helps you understand the information we are giving you. After you talk with the staff, tell them in your own words what you learned. This helps to make sure the staff has described each thing clearly. It also helps to explain things that may have been confusing. Before going home, make sure you can do these:  I can tell you about my procedure.  I can tell you what may help ease my pain.  I can tell you what I will do if I have a fever, chills, or am not able to pass urine.  Where can I learn more?   American Cancer Society  https://www.cancer.org/treatment/understanding-your-diagnosis/tests/endoscopy/cystoscopy.html   Can[employer] Research [employer]  https://www.cancerresearchuk.org/about-cancer/bladder-cancer/getting-diagnosed/tests-diagnose/cystoscopy   NHS Choices  http://www.nhs.uk/conditions/Cystoscopy/Pages/Introduction.aspx   Last Reviewed Date   2021-04-22  Consumer Information Use and Disclaimer   This information is not specific medical advice and does not replace information you receive from your health care provider. This is only a brief summary of general information. It does NOT include all information about conditions, illnesses, injuries, tests, procedures, treatments, therapies, discharge instructions or life-style choices that may apply to you. You must talk with your health care provider for complete information about your health and treatment options. This information should not be used to decide whether or not to accept your  health care providers advice, instructions or recommendations. Only your health care provider has the knowledge and training to provide advice that is right for you.  Copyright   Copyright © 2021 [employer] and its affiliates and/or licensors. All rights reserved.

## 2024-03-28 ENCOUNTER — OFFICE VISIT (OUTPATIENT)
Dept: HEMATOLOGY/ONCOLOGY | Facility: CLINIC | Age: 59
End: 2024-03-28
Payer: MEDICAID

## 2024-03-28 VITALS
HEIGHT: 66 IN | WEIGHT: 107 LBS | BODY MASS INDEX: 17.19 KG/M2 | HEART RATE: 74 BPM | OXYGEN SATURATION: 96 % | SYSTOLIC BLOOD PRESSURE: 112 MMHG | DIASTOLIC BLOOD PRESSURE: 77 MMHG | RESPIRATION RATE: 18 BRPM

## 2024-03-28 DIAGNOSIS — R64 CANCER CACHEXIA: ICD-10-CM

## 2024-03-28 DIAGNOSIS — E03.9 HYPOTHYROIDISM, UNSPECIFIED TYPE: ICD-10-CM

## 2024-03-28 DIAGNOSIS — D72.829 LEUKOCYTOSIS, UNSPECIFIED TYPE: ICD-10-CM

## 2024-03-28 DIAGNOSIS — G89.3 CANCER RELATED PAIN: ICD-10-CM

## 2024-03-28 DIAGNOSIS — C34.92 MALIGNANT NEOPLASM OF LEFT LUNG STAGE 4: Primary | ICD-10-CM

## 2024-03-28 LAB
ALBUMIN SERPL BCP-MCNC: 3.2 G/DL (ref 3.4–5)
ALBUMIN/GLOBULIN RATIO: 0.76 RATIO (ref 1.1–1.8)
ALP SERPL-CCNC: 62 U/L (ref 46–116)
ALT SERPL W P-5'-P-CCNC: 11 U/L (ref 12–78)
ANION GAP SERPL CALC-SCNC: 11 MMOL/L (ref 3–11)
AST SERPL-CCNC: 18 U/L (ref 15–37)
BANDS: 2 % (ref 0–5)
BILIRUB SERPL-MCNC: 0.6 MG/DL (ref 0–1)
BUN SERPL-MCNC: 11 MG/DL (ref 7–18)
BUN/CREAT SERPL: 12.5 RATIO (ref 7–18)
CALCIUM SERPL-MCNC: 9.5 MG/DL (ref 8.8–10.5)
CELLS COUNTED: 100
CHLORIDE SERPL-SCNC: 96 MMOL/L (ref 100–108)
CO2 SERPL-SCNC: 27 MMOL/L (ref 21–32)
CREAT SERPL-MCNC: 0.88 MG/DL (ref 0.7–1.3)
ERYTHROCYTE [DISTWIDTH] IN BLOOD BY AUTOMATED COUNT: 15.3 % (ref 12.5–18)
GFR ESTIMATION: > 60
GLOBULIN: 4.2 G/DL (ref 2.3–3.5)
GLUCOSE SERPL-MCNC: 84 MG/DL (ref 70–110)
HCT VFR BLD AUTO: 35.3 % (ref 42–52)
HGB BLD-MCNC: 11.5 G/DL (ref 14–18)
LYMPHOCYTES NFR BLD: 14 % (ref 25–40)
MACROCYTES BLD QL SMEAR: ABNORMAL
MCH RBC QN AUTO: 33.3 PG (ref 27–31.2)
MCHC RBC AUTO-ENTMCNC: 32.6 G/DL (ref 31.8–35.4)
MCV RBC AUTO: 102.3 FL (ref 80–97)
METAMYELOCYTES # BLD MANUAL: 1 % (ref 0–0)
MONOCYTES NFR BLD: 4 % (ref 1–15)
MYELOCYTES: 1 % (ref 0–0)
NEUTROPHILS # BLD AUTO: 12 10*3/UL (ref 1.8–7.7)
NEUTROPHILS NFR BLD: 78 % (ref 37–80)
NUCLEATED RED BLOOD CELLS: 0 %
PLATELETS: 221 10*3/UL (ref 142–424)
POTASSIUM SERPL-SCNC: 4.2 MMOL/L (ref 3.6–5.2)
PROT SERPL-MCNC: 7.4 G/DL (ref 6.4–8.2)
RBC # BLD AUTO: 3.45 10*6/UL (ref 4.7–6.1)
SMALL PLATELETS BLD QL SMEAR: ADEQUATE
SODIUM BLD-SCNC: 134 MMOL/L (ref 135–145)
TSH SERPL DL<=0.005 MIU/L-ACNC: 37.92 UIU/ML (ref 0.36–3.74)
WBC # BLD: 15 10*3/UL (ref 4.6–10.2)

## 2024-03-28 PROCEDURE — 3074F SYST BP LT 130 MM HG: CPT | Mod: CPTII,S$GLB,, | Performed by: NURSE PRACTITIONER

## 2024-03-28 PROCEDURE — 1160F RVW MEDS BY RX/DR IN RCRD: CPT | Mod: CPTII,S$GLB,, | Performed by: NURSE PRACTITIONER

## 2024-03-28 PROCEDURE — 1159F MED LIST DOCD IN RCRD: CPT | Mod: CPTII,S$GLB,, | Performed by: NURSE PRACTITIONER

## 2024-03-28 PROCEDURE — 3008F BODY MASS INDEX DOCD: CPT | Mod: CPTII,S$GLB,, | Performed by: NURSE PRACTITIONER

## 2024-03-28 PROCEDURE — 3078F DIAST BP <80 MM HG: CPT | Mod: CPTII,S$GLB,, | Performed by: NURSE PRACTITIONER

## 2024-03-28 PROCEDURE — 99215 OFFICE O/P EST HI 40 MIN: CPT | Mod: S$GLB,,, | Performed by: NURSE PRACTITIONER

## 2024-03-28 RX ORDER — CIPROFLOXACIN 500 MG/1
500 TABLET ORAL 2 TIMES DAILY
Qty: 14 TABLET | Refills: 0 | Status: SHIPPED | OUTPATIENT
Start: 2024-03-28 | End: 2024-04-04

## 2024-03-28 RX ORDER — MORPHINE SULFATE 15 MG/1
15 TABLET, FILM COATED, EXTENDED RELEASE ORAL 2 TIMES DAILY
Qty: 60 TABLET | Refills: 0 | Status: SHIPPED | OUTPATIENT
Start: 2024-03-28

## 2024-03-28 RX ORDER — MEGESTROL ACETATE 40 MG/ML
200 SUSPENSION ORAL
Qty: 450 ML | Refills: 11 | Status: SHIPPED | OUTPATIENT
Start: 2024-03-28 | End: 2025-03-28
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
## 2024-04-04 ENCOUNTER — OFFICE VISIT (OUTPATIENT)
Dept: HEMATOLOGY/ONCOLOGY | Facility: CLINIC | Age: 59
End: 2024-04-04
Payer: MEDICAID

## 2024-04-04 VITALS
WEIGHT: 106.5 LBS | OXYGEN SATURATION: 96 % | SYSTOLIC BLOOD PRESSURE: 115 MMHG | RESPIRATION RATE: 18 BRPM | HEART RATE: 117 BPM | DIASTOLIC BLOOD PRESSURE: 77 MMHG | BODY MASS INDEX: 17.12 KG/M2 | HEIGHT: 66 IN

## 2024-04-04 DIAGNOSIS — G89.3 CANCER RELATED PAIN: ICD-10-CM

## 2024-04-04 DIAGNOSIS — R11.2 CINV (CHEMOTHERAPY-INDUCED NAUSEA AND VOMITING): ICD-10-CM

## 2024-04-04 DIAGNOSIS — C34.92 MALIGNANT NEOPLASM OF LEFT LUNG STAGE 4: Primary | ICD-10-CM

## 2024-04-04 DIAGNOSIS — E03.9 HYPOTHYROIDISM, UNSPECIFIED TYPE: ICD-10-CM

## 2024-04-04 DIAGNOSIS — R64 CANCER CACHEXIA: ICD-10-CM

## 2024-04-04 DIAGNOSIS — T45.1X5A CINV (CHEMOTHERAPY-INDUCED NAUSEA AND VOMITING): ICD-10-CM

## 2024-04-04 PROCEDURE — 3078F DIAST BP <80 MM HG: CPT | Mod: CPTII,S$GLB,, | Performed by: INTERNAL MEDICINE

## 2024-04-04 PROCEDURE — 3074F SYST BP LT 130 MM HG: CPT | Mod: CPTII,S$GLB,, | Performed by: INTERNAL MEDICINE

## 2024-04-04 PROCEDURE — 1159F MED LIST DOCD IN RCRD: CPT | Mod: CPTII,S$GLB,, | Performed by: INTERNAL MEDICINE

## 2024-04-04 PROCEDURE — G2211 COMPLEX E/M VISIT ADD ON: HCPCS | Mod: S$GLB,,, | Performed by: INTERNAL MEDICINE

## 2024-04-04 PROCEDURE — 99215 OFFICE O/P EST HI 40 MIN: CPT | Mod: S$GLB,,, | Performed by: INTERNAL MEDICINE
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
## 2024-04-05 DIAGNOSIS — C34.92 MALIGNANT NEOPLASM OF LEFT LUNG STAGE 4: Primary | ICD-10-CM

## 2024-04-11 ENCOUNTER — PATIENT MESSAGE (OUTPATIENT)
Dept: HEMATOLOGY/ONCOLOGY | Facility: CLINIC | Age: 59
End: 2024-04-11

## 2024-04-18 ENCOUNTER — TELEPHONE (OUTPATIENT)
Dept: HEMATOLOGY/ONCOLOGY | Facility: CLINIC | Age: 59
End: 2024-04-18

## 2024-04-18 DIAGNOSIS — C34.92 MALIGNANT NEOPLASM OF LEFT LUNG STAGE 4: Primary | ICD-10-CM

## 2024-04-18 NOTE — PROGRESS NOTES
Missed appointment today as he is unable to get out of bed due to progression of pain, weakness, fatigue and inability to ambulate secondary to stage IV lung cancer.  Patient's sister Ingrid Motta a clinic today to request hospice services for patient.  Previously hospice has been discussed with Jaime Motta via telephone visit yesterday and patient is agreeable.

## 2024-04-21 ENCOUNTER — PATIENT MESSAGE (OUTPATIENT)
Dept: HEMATOLOGY/ONCOLOGY | Facility: CLINIC | Age: 59
End: 2024-04-21
Payer: MEDICAID